# Patient Record
Sex: MALE | Race: WHITE | Employment: FULL TIME | URBAN - METROPOLITAN AREA
[De-identification: names, ages, dates, MRNs, and addresses within clinical notes are randomized per-mention and may not be internally consistent; named-entity substitution may affect disease eponyms.]

---

## 2019-03-29 ENCOUNTER — HOSPITAL ENCOUNTER (EMERGENCY)
Age: 44
Discharge: HOME | End: 2019-03-29
Payer: COMMERCIAL

## 2019-03-29 ENCOUNTER — APPOINTMENT (OUTPATIENT)
Dept: GENERAL RADIOLOGY | Age: 44
End: 2019-03-29
Payer: COMMERCIAL

## 2019-03-29 VITALS
RESPIRATION RATE: 17 BRPM | DIASTOLIC BLOOD PRESSURE: 74 MMHG | HEART RATE: 65 BPM | WEIGHT: 195 LBS | SYSTOLIC BLOOD PRESSURE: 137 MMHG | BODY MASS INDEX: 27.92 KG/M2 | TEMPERATURE: 97.6 F | HEIGHT: 70 IN | OXYGEN SATURATION: 97 %

## 2019-03-29 DIAGNOSIS — S39.012A STRAIN OF LUMBAR REGION, INITIAL ENCOUNTER: Primary | ICD-10-CM

## 2019-03-29 DIAGNOSIS — M54.31 SCIATICA OF RIGHT SIDE: ICD-10-CM

## 2019-03-29 PROCEDURE — 99283 EMERGENCY DEPT VISIT LOW MDM: CPT

## 2019-03-29 PROCEDURE — 6370000000 HC RX 637 (ALT 250 FOR IP): Performed by: NURSE PRACTITIONER

## 2019-03-29 PROCEDURE — 72100 X-RAY EXAM L-S SPINE 2/3 VWS: CPT

## 2019-03-29 RX ORDER — ASPIRIN 81 MG/1
81 TABLET, CHEWABLE ORAL DAILY
COMMUNITY

## 2019-03-29 RX ORDER — METHOCARBAMOL 750 MG/1
750 TABLET, FILM COATED ORAL ONCE
Status: COMPLETED | OUTPATIENT
Start: 2019-03-29 | End: 2019-03-29

## 2019-03-29 RX ORDER — NAPROXEN 250 MG/1
500 TABLET ORAL ONCE
Status: COMPLETED | OUTPATIENT
Start: 2019-03-29 | End: 2019-03-29

## 2019-03-29 RX ORDER — NAPROXEN 500 MG/1
500 TABLET ORAL 2 TIMES DAILY
Qty: 30 TABLET | Refills: 0 | Status: SHIPPED | OUTPATIENT
Start: 2019-03-29

## 2019-03-29 RX ORDER — METHOCARBAMOL 500 MG/1
500 TABLET, FILM COATED ORAL 3 TIMES DAILY
Qty: 20 TABLET | Refills: 0 | Status: SHIPPED | OUTPATIENT
Start: 2019-03-29

## 2019-03-29 RX ORDER — PREDNISONE 20 MG/1
60 TABLET ORAL ONCE
Status: COMPLETED | OUTPATIENT
Start: 2019-03-29 | End: 2019-03-29

## 2019-03-29 RX ORDER — PREDNISONE 10 MG/1
60 TABLET ORAL DAILY
Qty: 24 TABLET | Refills: 0 | Status: SHIPPED | OUTPATIENT
Start: 2019-03-29 | End: 2019-04-02

## 2019-03-29 RX ADMIN — PREDNISONE 60 MG: 20 TABLET ORAL at 17:38

## 2019-03-29 RX ADMIN — NAPROXEN 500 MG: 250 TABLET ORAL at 17:38

## 2019-03-29 RX ADMIN — METHOCARBAMOL TABLETS 750 MG: 750 TABLET, COATED ORAL at 17:38

## 2019-03-29 ASSESSMENT — PAIN DESCRIPTION - LOCATION
LOCATION: BACK
LOCATION: BACK

## 2019-03-29 ASSESSMENT — PAIN SCALES - GENERAL
PAINLEVEL_OUTOF10: 8
PAINLEVEL_OUTOF10: 8

## 2019-03-29 ASSESSMENT — PAIN DESCRIPTION - DESCRIPTORS: DESCRIPTORS: TIGHTNESS

## 2019-03-31 NOTE — ED PROVIDER NOTES
file     Gets together: Not on file     Attends Bahai service: Not on file     Active member of club or organization: Not on file     Attends meetings of clubs or organizations: Not on file     Relationship status: Not on file    Intimate partner violence:     Fear of current or ex partner: Not on file     Emotionally abused: Not on file     Physically abused: Not on file     Forced sexual activity: Not on file   Other Topics Concern    Not on file   Social History Narrative    Not on file     No current facility-administered medications for this encounter. Current Outpatient Medications   Medication Sig Dispense Refill    aspirin 81 MG chewable tablet Take 81 mg by mouth daily      naproxen (NAPROSYN) 500 MG tablet Take 1 tablet by mouth 2 times daily 30 tablet 0    methocarbamol (ROBAXIN) 500 MG tablet Take 1 tablet by mouth 3 times daily 20 tablet 0    predniSONE (DELTASONE) 10 MG tablet Take 6 tablets by mouth daily for 4 doses 24 tablet 0     No Known Allergies    REVIEW OF SYSTEMS  6 systems reviewed, pertinent positives per HPI otherwise noted to be negative    PHYSICAL EXAM  /74   Pulse 65   Temp 97.6 °F (36.4 °C) (Oral)   Resp 17   Ht 5' 10\" (1.778 m)   Wt 195 lb (88.5 kg)   SpO2 97%   BMI 27.98 kg/m²   GENERAL APPEARANCE: Awake and alert. Well-developed. Well-nourished. Non-toxic. Cooperative. No acute distress. HEAD: Normocephalic. Atraumatic. EYES: EOM's grossly intact. Bilateral conjunctiva are clear and without exudate. Conjunctiva non-injected. ENT: Mucous membranes are moist.   NECK: Supple. Normal ROM. CHEST: Equal symmetric chest rise. LUNGS: Breathing is unlabored. Speaking comfortably in full sentences. Bilateral lung sounds are clear to auscultation. Abdomen: Non distended, non tender and soft. Bowel sounds are audible and active in all four quadrants. EXTREMITIES: MAEE. No acute deformities.    No midline cervical, thoracic or lumbar spinal tenderness with palpation. The patient does have right lumbar paraspinal tenderness as well as right gluteal tenderness that is reproducible with palpation. Positive right straight leg raise. Patellar DTRs intact and 2+ bilaterally. Posterior tibial pulses are palpable and 2+ with capillary refill brisk and less than 3 seconds. SKIN: Warm and dry. Pink. No acute rashes. No erythema or warmth. NEUROLOGICAL: Alert and oriented x3. Strength is 5/5 in all extremities and sensation is intact. Ambulatory with steady gait. RADIOLOGY  Xr Lumbar Spine (2-3 Views)    Result Date: 3/29/2019  EXAMINATION: 3 XRAY VIEWS OF THE LUMBAR SPINE 3/29/2019 5:21 pm COMPARISON: None. HISTORY: ORDERING SYSTEM PROVIDED HISTORY: low back pain TECHNOLOGIST PROVIDED HISTORY: Reason for exam:->low back pain Ordering Physician Provided Reason for Exam: back pain radiating down right hip Acuity: Acute Type of Exam: Initial FINDINGS: The lumbar vertebral bodies are normal in height and alignment. No significant disc space narrowing or facet hypertrophy appreciated. No lumbar vertebral compression or subluxation. ED COURSE  I have evaluated this patient. Dr. Aniyah Jara was available for consultation. Emergency department course notable for lumbar strain with right sided sciatica. Afebrile. Vital stable. Circulation and sensation intact in the extremities. Negative for saddle anesthesia or loss of bowel or bladder control. X-ray of the lumbar spine unremarkable. Patient received  Naprosyn and Robaxin as well as steroids for pain, with good relief. Educated on back stretching exercises as well as warm/ cool compresses. A discussion was had with the patient regarding imaging results as well as plan of care. Patient instructed to follow up with PCP in 2-3 days for further evaluation/treatment. Patient will be discharged home with a prescription for the following medications below.  Patient verbalizes understanding, all questions were answered and he is agreeable with the plan of care. Patient will return to ED for new/worsening symptoms. Patient was given scripts for the following medications. I counseled patient how to take these medications. Discharge Medication List as of 3/29/2019  5:49 PM      START taking these medications    Details   naproxen (NAPROSYN) 500 MG tablet Take 1 tablet by mouth 2 times daily, Disp-30 tablet, R-0Print      methocarbamol (ROBAXIN) 500 MG tablet Take 1 tablet by mouth 3 times daily, Disp-20 tablet, R-0Print      predniSONE (DELTASONE) 10 MG tablet Take 6 tablets by mouth daily for 4 doses, Disp-24 tablet, R-0Print           MDM  No results found for this visit on 03/29/19. I estimate there is LOW risk for ABDOMINAL AORTIC ANEURYSM, CAUDA EQUINA SYNDROME, EPIDURAL MASS LESION, SPINAL STENOSIS, OR HERNIATED DISK CAUSING SEVERE STENOSIS, thus I consider the discharge disposition reasonable. Joy Phillips and I have discussed the diagnosis and risks, and we agree with discharging home to follow-up with their primary doctor. We also discussed returning to the Emergency Department immediately if new or worsening symptoms occur. We have discussed the symptoms which are most concerning (e.g., saddle anesthesia, urinary or bowel incontinence or retention, changing or worsening pain) that necessitate immediate return. Final Impression    1. Strain of lumbar region, initial encounter    2. Sciatica of right side        Blood pressure 137/74, pulse 65, temperature 97.6 °F (36.4 °C), temperature source Oral, resp. rate 17, height 5' 10\" (1.778 m), weight 195 lb (88.5 kg), SpO2 97 %. DISPOSITION  Patient was discharged to home in good condition. DISCLAIMER:  Please note this report has been produced using speech recognition Dragon software and may contain errors related to that system including errors in grammar, punctuation, and spelling, as well as words and phrases that may be inappropriate.  If there are any questions or concerns please feel free to contact the dictating provider for clarification.                                                         ISHA Mckeon - CNP  03/31/19 6837

## 2023-02-07 ENCOUNTER — APPOINTMENT (OUTPATIENT)
Dept: GENERAL RADIOLOGY | Age: 48
DRG: 166 | End: 2023-02-07
Payer: MEDICAID

## 2023-02-07 ENCOUNTER — HOSPITAL ENCOUNTER (INPATIENT)
Age: 48
LOS: 11 days | Discharge: HOME | DRG: 166 | End: 2023-02-19
Attending: EMERGENCY MEDICINE | Admitting: INTERNAL MEDICINE
Payer: MEDICAID

## 2023-02-07 DIAGNOSIS — J81.0 ACUTE PULMONARY EDEMA (HCC): Primary | ICD-10-CM

## 2023-02-07 DIAGNOSIS — J90 PLEURAL EFFUSION: ICD-10-CM

## 2023-02-07 DIAGNOSIS — J18.9 PNEUMONIA DUE TO INFECTIOUS ORGANISM, UNSPECIFIED LATERALITY, UNSPECIFIED PART OF LUNG: ICD-10-CM

## 2023-02-07 DIAGNOSIS — R09.02 HYPOXIA: ICD-10-CM

## 2023-02-07 PROCEDURE — 99285 EMERGENCY DEPT VISIT HI MDM: CPT

## 2023-02-07 PROCEDURE — 93005 ELECTROCARDIOGRAM TRACING: CPT | Performed by: EMERGENCY MEDICINE

## 2023-02-07 PROCEDURE — 71045 X-RAY EXAM CHEST 1 VIEW: CPT

## 2023-02-07 RX ORDER — IPRATROPIUM BROMIDE AND ALBUTEROL SULFATE 2.5; .5 MG/3ML; MG/3ML
1 SOLUTION RESPIRATORY (INHALATION) ONCE
Status: COMPLETED | OUTPATIENT
Start: 2023-02-07 | End: 2023-02-08

## 2023-02-07 RX ORDER — METHADONE HYDROCHLORIDE 10 MG/1
45 TABLET ORAL DAILY
COMMUNITY

## 2023-02-07 ASSESSMENT — ENCOUNTER SYMPTOMS
GASTROINTESTINAL NEGATIVE: 1
EYES NEGATIVE: 1
COUGH: 1
SHORTNESS OF BREATH: 1

## 2023-02-07 ASSESSMENT — PAIN - FUNCTIONAL ASSESSMENT: PAIN_FUNCTIONAL_ASSESSMENT: NONE - DENIES PAIN

## 2023-02-08 ENCOUNTER — APPOINTMENT (OUTPATIENT)
Dept: GENERAL RADIOLOGY | Age: 48
DRG: 166 | End: 2023-02-08
Payer: MEDICAID

## 2023-02-08 ENCOUNTER — APPOINTMENT (OUTPATIENT)
Dept: CT IMAGING | Age: 48
DRG: 166 | End: 2023-02-08
Payer: MEDICAID

## 2023-02-08 PROBLEM — D64.9 NORMOCYTIC ANEMIA: Status: ACTIVE | Noted: 2023-02-08

## 2023-02-08 PROBLEM — Z86.711 HX PULMONARY EMBOLISM: Status: ACTIVE | Noted: 2023-02-08

## 2023-02-08 PROBLEM — J90 PLEURAL EFFUSION: Status: ACTIVE | Noted: 2023-02-08

## 2023-02-08 PROBLEM — I10 HTN (HYPERTENSION): Status: ACTIVE | Noted: 2023-02-08

## 2023-02-08 PROBLEM — Z79.01 CHRONIC ANTICOAGULATION: Status: ACTIVE | Noted: 2023-02-08

## 2023-02-08 PROBLEM — F11.20 OPIOID DEPENDENCE ON AGONIST THERAPY (HCC): Status: ACTIVE | Noted: 2023-02-08

## 2023-02-08 PROBLEM — J94.2 HEMOTHORAX ON RIGHT: Status: ACTIVE | Noted: 2023-02-08

## 2023-02-08 PROBLEM — I50.9 ACUTE HEART FAILURE, UNSPECIFIED HEART FAILURE TYPE (HCC): Status: ACTIVE | Noted: 2023-02-08

## 2023-02-08 PROBLEM — J81.0 ACUTE PULMONARY EDEMA (HCC): Status: ACTIVE | Noted: 2023-02-08

## 2023-02-08 LAB
ALBUMIN SERPL-MCNC: 2.9 G/DL (ref 3.4–5)
ANION GAP SERPL CALCULATED.3IONS-SCNC: 11 MMOL/L (ref 3–16)
ANION GAP SERPL CALCULATED.3IONS-SCNC: 12 MMOL/L (ref 3–16)
APPEARANCE FLUID: NORMAL
BASE EXCESS VENOUS: 8.4 MMOL/L (ref -2–3)
BASOPHILS ABSOLUTE: 0 K/UL (ref 0–0.2)
BASOPHILS RELATIVE PERCENT: 0.2 %
BUN BLDV-MCNC: 11 MG/DL (ref 7–20)
BUN BLDV-MCNC: 11 MG/DL (ref 7–20)
CALCIUM SERPL-MCNC: 8.5 MG/DL (ref 8.3–10.6)
CALCIUM SERPL-MCNC: 8.6 MG/DL (ref 8.3–10.6)
CARBOXYHEMOGLOBIN: 1.4 % (ref 0–1.5)
CELL COUNT FLUID TYPE: NORMAL
CHLORIDE BLD-SCNC: 90 MMOL/L (ref 99–110)
CHLORIDE BLD-SCNC: 91 MMOL/L (ref 99–110)
CLOT EVALUATION: NORMAL
CO2: 31 MMOL/L (ref 21–32)
CO2: 32 MMOL/L (ref 21–32)
COLOR FLUID: NORMAL
CREAT SERPL-MCNC: 0.9 MG/DL (ref 0.9–1.3)
CREAT SERPL-MCNC: 1 MG/DL (ref 0.9–1.3)
EKG ATRIAL RATE: 103 BPM
EKG DIAGNOSIS: NORMAL
EKG P AXIS: 51 DEGREES
EKG P-R INTERVAL: 258 MS
EKG Q-T INTERVAL: 342 MS
EKG QRS DURATION: 88 MS
EKG QTC CALCULATION (BAZETT): 448 MS
EKG R AXIS: -30 DEGREES
EKG T AXIS: 151 DEGREES
EKG VENTRICULAR RATE: 103 BPM
EOSINOPHILS ABSOLUTE: 0 K/UL (ref 0–0.6)
EOSINOPHILS RELATIVE PERCENT: 0.4 %
FOLATE: <2 NG/ML (ref 4.78–24.2)
GFR SERPL CREATININE-BSD FRML MDRD: >60 ML/MIN/{1.73_M2}
GFR SERPL CREATININE-BSD FRML MDRD: >60 ML/MIN/{1.73_M2}
GLUCOSE BLD-MCNC: 100 MG/DL (ref 70–99)
GLUCOSE BLD-MCNC: 119 MG/DL (ref 70–99)
HCO3 VENOUS: 34.6 MMOL/L (ref 24–28)
HCT VFR BLD CALC: 33.2 % (ref 40.5–52.5)
HEMOGLOBIN, VEN, REDUCED: 43.7 %
HEMOGLOBIN: 10.9 G/DL (ref 13.5–17.5)
INFLUENZA A: NOT DETECTED
INFLUENZA B: NOT DETECTED
INR BLD: 2.57 (ref 0.87–1.14)
IRON SATURATION: 48 % (ref 20–50)
IRON: 113 UG/DL (ref 59–158)
LACTATE DEHYDROGENASE: 777 U/L (ref 100–190)
LACTIC ACID: 1.7 MMOL/L (ref 0.4–2)
LV EF: 58 %
LVEF MODALITY: NORMAL
LYMPHOCYTES ABSOLUTE: 0.6 K/UL (ref 1–5.1)
LYMPHOCYTES RELATIVE PERCENT: 7.4 %
LYMPHOCYTES, BODY FLUID: 34 %
MAGNESIUM: 2.1 MG/DL (ref 1.8–2.4)
MCH RBC QN AUTO: 32.6 PG (ref 26–34)
MCHC RBC AUTO-ENTMCNC: 32.9 G/DL (ref 31–36)
MCV RBC AUTO: 99.2 FL (ref 80–100)
METHEMOGLOBIN VENOUS: <0 % (ref 0–1.5)
MONOCYTE, FLUID: 2 %
MONOCYTES ABSOLUTE: 0.3 K/UL (ref 0–1.3)
MONOCYTES RELATIVE PERCENT: 3.1 %
NEUTROPHIL, FLUID: 64 %
NEUTROPHILS ABSOLUTE: 7.5 K/UL (ref 1.7–7.7)
NEUTROPHILS RELATIVE PERCENT: 88.9 %
NUCLEATED CELLS FLUID: 629 /CUMM
O2 SAT, VEN: 56 %
PCO2, VEN: 48.8 MMHG (ref 41–51)
PDW BLD-RTO: 18.7 % (ref 12.4–15.4)
PH VENOUS: 7.46 (ref 7.35–7.45)
PLATELET # BLD: 196 K/UL (ref 135–450)
PMV BLD AUTO: 10.8 FL (ref 5–10.5)
PO2, VEN: 33.2 MMHG (ref 25–40)
POTASSIUM SERPL-SCNC: 3.3 MMOL/L (ref 3.5–5.1)
POTASSIUM SERPL-SCNC: 3.6 MMOL/L (ref 3.5–5.1)
PRO-BNP: 4462 PG/ML (ref 0–124)
PROCALCITONIN: 0.19 NG/ML (ref 0–0.15)
PROTHROMBIN TIME: 27.7 SEC (ref 11.7–14.5)
RBC # BLD: 3.34 M/UL (ref 4.2–5.9)
RBC FLUID: NORMAL /CUMM
SARS-COV-2 RNA, RT PCR: NOT DETECTED
SODIUM BLD-SCNC: 133 MMOL/L (ref 136–145)
SODIUM BLD-SCNC: 134 MMOL/L (ref 136–145)
TCO2 CALC VENOUS: 36 MMOL/L
TOTAL IRON BINDING CAPACITY: 234 UG/DL (ref 260–445)
TOTAL PROTEIN: 6.8 G/DL (ref 6.4–8.2)
TROPONIN: <0.01 NG/ML
TROPONIN: <0.01 NG/ML
TSH SERPL DL<=0.05 MIU/L-ACNC: 1.86 UIU/ML (ref 0.27–4.2)
VITAMIN B-12: 622 PG/ML (ref 211–911)
WBC # BLD: 8.4 K/UL (ref 4–11)

## 2023-02-08 PROCEDURE — 82803 BLOOD GASES ANY COMBINATION: CPT

## 2023-02-08 PROCEDURE — 84155 ASSAY OF PROTEIN SERUM: CPT

## 2023-02-08 PROCEDURE — 2700000000 HC OXYGEN THERAPY PER DAY

## 2023-02-08 PROCEDURE — 93306 TTE W/DOPPLER COMPLETE: CPT

## 2023-02-08 PROCEDURE — 94761 N-INVAS EAR/PLS OXIMETRY MLT: CPT

## 2023-02-08 PROCEDURE — 6360000004 HC RX CONTRAST MEDICATION: Performed by: EMERGENCY MEDICINE

## 2023-02-08 PROCEDURE — 83735 ASSAY OF MAGNESIUM: CPT

## 2023-02-08 PROCEDURE — 83550 IRON BINDING TEST: CPT

## 2023-02-08 PROCEDURE — 71260 CT THORAX DX C+: CPT | Performed by: EMERGENCY MEDICINE

## 2023-02-08 PROCEDURE — 94640 AIRWAY INHALATION TREATMENT: CPT

## 2023-02-08 PROCEDURE — 82746 ASSAY OF FOLIC ACID SERUM: CPT

## 2023-02-08 PROCEDURE — 85610 PROTHROMBIN TIME: CPT

## 2023-02-08 PROCEDURE — 2580000003 HC RX 258: Performed by: INTERNAL MEDICINE

## 2023-02-08 PROCEDURE — 85025 COMPLETE CBC W/AUTO DIFF WBC: CPT

## 2023-02-08 PROCEDURE — 6370000000 HC RX 637 (ALT 250 FOR IP): Performed by: STUDENT IN AN ORGANIZED HEALTH CARE EDUCATION/TRAINING PROGRAM

## 2023-02-08 PROCEDURE — 6370000000 HC RX 637 (ALT 250 FOR IP): Performed by: INTERNAL MEDICINE

## 2023-02-08 PROCEDURE — 84145 PROCALCITONIN (PCT): CPT

## 2023-02-08 PROCEDURE — 82042 OTHER SOURCE ALBUMIN QUAN EA: CPT

## 2023-02-08 PROCEDURE — 32551 INSERTION OF CHEST TUBE: CPT | Performed by: INTERNAL MEDICINE

## 2023-02-08 PROCEDURE — 82607 VITAMIN B-12: CPT

## 2023-02-08 PROCEDURE — 99223 1ST HOSP IP/OBS HIGH 75: CPT | Performed by: INTERNAL MEDICINE

## 2023-02-08 PROCEDURE — 83605 ASSAY OF LACTIC ACID: CPT

## 2023-02-08 PROCEDURE — 82040 ASSAY OF SERUM ALBUMIN: CPT

## 2023-02-08 PROCEDURE — 83615 LACTATE (LD) (LDH) ENZYME: CPT

## 2023-02-08 PROCEDURE — 84157 ASSAY OF PROTEIN OTHER: CPT

## 2023-02-08 PROCEDURE — 80307 DRUG TEST PRSMV CHEM ANLYZR: CPT

## 2023-02-08 PROCEDURE — 80048 BASIC METABOLIC PNL TOTAL CA: CPT

## 2023-02-08 PROCEDURE — 83540 ASSAY OF IRON: CPT

## 2023-02-08 PROCEDURE — 87636 SARSCOV2 & INF A&B AMP PRB: CPT

## 2023-02-08 PROCEDURE — 82945 GLUCOSE OTHER FLUID: CPT

## 2023-02-08 PROCEDURE — 84484 ASSAY OF TROPONIN QUANT: CPT

## 2023-02-08 PROCEDURE — 6360000002 HC RX W HCPCS: Performed by: EMERGENCY MEDICINE

## 2023-02-08 PROCEDURE — 6370000000 HC RX 637 (ALT 250 FOR IP): Performed by: EMERGENCY MEDICINE

## 2023-02-08 PROCEDURE — 83880 ASSAY OF NATRIURETIC PEPTIDE: CPT

## 2023-02-08 PROCEDURE — 89050 BODY FLUID CELL COUNT: CPT

## 2023-02-08 PROCEDURE — 0W9930Z DRAINAGE OF RIGHT PLEURAL CAVITY WITH DRAINAGE DEVICE, PERCUTANEOUS APPROACH: ICD-10-PCS | Performed by: INTERNAL MEDICINE

## 2023-02-08 PROCEDURE — 6360000002 HC RX W HCPCS: Performed by: INTERNAL MEDICINE

## 2023-02-08 PROCEDURE — 36415 COLL VENOUS BLD VENIPUNCTURE: CPT

## 2023-02-08 PROCEDURE — 84443 ASSAY THYROID STIM HORMONE: CPT

## 2023-02-08 PROCEDURE — 2060000000 HC ICU INTERMEDIATE R&B

## 2023-02-08 PROCEDURE — 71045 X-RAY EXAM CHEST 1 VIEW: CPT

## 2023-02-08 RX ORDER — SODIUM CHLORIDE 0.9 % (FLUSH) 0.9 %
5-40 SYRINGE (ML) INJECTION EVERY 12 HOURS SCHEDULED
Status: DISCONTINUED | OUTPATIENT
Start: 2023-02-08 | End: 2023-02-19 | Stop reason: HOSPADM

## 2023-02-08 RX ORDER — FUROSEMIDE 10 MG/ML
40 INJECTION INTRAMUSCULAR; INTRAVENOUS 2 TIMES DAILY
Status: DISCONTINUED | OUTPATIENT
Start: 2023-02-08 | End: 2023-02-09

## 2023-02-08 RX ORDER — FOLIC ACID 1 MG/1
1 TABLET ORAL DAILY
Status: DISCONTINUED | OUTPATIENT
Start: 2023-02-08 | End: 2023-02-19 | Stop reason: HOSPADM

## 2023-02-08 RX ORDER — METHOCARBAMOL 500 MG/1
500 TABLET, FILM COATED ORAL 3 TIMES DAILY
Status: DISCONTINUED | OUTPATIENT
Start: 2023-02-08 | End: 2023-02-08

## 2023-02-08 RX ORDER — MAGNESIUM HYDROXIDE/ALUMINUM HYDROXICE/SIMETHICONE 120; 1200; 1200 MG/30ML; MG/30ML; MG/30ML
30 SUSPENSION ORAL EVERY 6 HOURS PRN
Status: DISCONTINUED | OUTPATIENT
Start: 2023-02-08 | End: 2023-02-19 | Stop reason: HOSPADM

## 2023-02-08 RX ORDER — METHADONE HYDROCHLORIDE 10 MG/ML
45 CONCENTRATE ORAL DAILY
Status: DISCONTINUED | OUTPATIENT
Start: 2023-02-08 | End: 2023-02-19 | Stop reason: HOSPADM

## 2023-02-08 RX ORDER — ONDANSETRON 2 MG/ML
4 INJECTION INTRAMUSCULAR; INTRAVENOUS EVERY 6 HOURS PRN
Status: DISCONTINUED | OUTPATIENT
Start: 2023-02-08 | End: 2023-02-10

## 2023-02-08 RX ORDER — ASPIRIN 81 MG/1
81 TABLET, CHEWABLE ORAL DAILY
Status: DISCONTINUED | OUTPATIENT
Start: 2023-02-08 | End: 2023-02-19 | Stop reason: HOSPADM

## 2023-02-08 RX ORDER — POTASSIUM CHLORIDE 7.45 MG/ML
10 INJECTION INTRAVENOUS PRN
Status: DISCONTINUED | OUTPATIENT
Start: 2023-02-08 | End: 2023-02-19 | Stop reason: HOSPADM

## 2023-02-08 RX ORDER — ENOXAPARIN SODIUM 100 MG/ML
40 INJECTION SUBCUTANEOUS DAILY
Status: DISCONTINUED | OUTPATIENT
Start: 2023-02-08 | End: 2023-02-08

## 2023-02-08 RX ORDER — VALSARTAN 80 MG/1
40 TABLET ORAL EVERY 12 HOURS SCHEDULED
Status: DISCONTINUED | OUTPATIENT
Start: 2023-02-08 | End: 2023-02-17

## 2023-02-08 RX ORDER — SODIUM CHLORIDE 9 MG/ML
INJECTION, SOLUTION INTRAVENOUS PRN
Status: DISCONTINUED | OUTPATIENT
Start: 2023-02-08 | End: 2023-02-19 | Stop reason: HOSPADM

## 2023-02-08 RX ORDER — ACETAMINOPHEN 325 MG/1
650 TABLET ORAL EVERY 6 HOURS PRN
Status: DISCONTINUED | OUTPATIENT
Start: 2023-02-08 | End: 2023-02-19 | Stop reason: HOSPADM

## 2023-02-08 RX ORDER — FUROSEMIDE 10 MG/ML
40 INJECTION INTRAMUSCULAR; INTRAVENOUS ONCE
Status: COMPLETED | OUTPATIENT
Start: 2023-02-08 | End: 2023-02-08

## 2023-02-08 RX ORDER — ACETAMINOPHEN 650 MG/1
650 SUPPOSITORY RECTAL EVERY 6 HOURS PRN
Status: DISCONTINUED | OUTPATIENT
Start: 2023-02-08 | End: 2023-02-19 | Stop reason: HOSPADM

## 2023-02-08 RX ORDER — POTASSIUM CHLORIDE 20 MEQ/1
40 TABLET, EXTENDED RELEASE ORAL PRN
Status: DISCONTINUED | OUTPATIENT
Start: 2023-02-08 | End: 2023-02-19 | Stop reason: HOSPADM

## 2023-02-08 RX ORDER — POTASSIUM CHLORIDE 20 MEQ/1
20 TABLET, EXTENDED RELEASE ORAL 2 TIMES DAILY WITH MEALS
Status: DISCONTINUED | OUTPATIENT
Start: 2023-02-08 | End: 2023-02-13

## 2023-02-08 RX ORDER — POLYETHYLENE GLYCOL 3350 17 G/17G
17 POWDER, FOR SOLUTION ORAL DAILY PRN
Status: DISCONTINUED | OUTPATIENT
Start: 2023-02-08 | End: 2023-02-19 | Stop reason: HOSPADM

## 2023-02-08 RX ORDER — CARVEDILOL 3.12 MG/1
3.12 TABLET ORAL 2 TIMES DAILY WITH MEALS
Status: DISCONTINUED | OUTPATIENT
Start: 2023-02-08 | End: 2023-02-17

## 2023-02-08 RX ORDER — SODIUM CHLORIDE 0.9 % (FLUSH) 0.9 %
5-40 SYRINGE (ML) INJECTION PRN
Status: DISCONTINUED | OUTPATIENT
Start: 2023-02-08 | End: 2023-02-19 | Stop reason: HOSPADM

## 2023-02-08 RX ORDER — ONDANSETRON 4 MG/1
4 TABLET, ORALLY DISINTEGRATING ORAL EVERY 8 HOURS PRN
Status: DISCONTINUED | OUTPATIENT
Start: 2023-02-08 | End: 2023-02-10

## 2023-02-08 RX ORDER — MAGNESIUM SULFATE IN WATER 40 MG/ML
2000 INJECTION, SOLUTION INTRAVENOUS PRN
Status: DISCONTINUED | OUTPATIENT
Start: 2023-02-08 | End: 2023-02-19 | Stop reason: HOSPADM

## 2023-02-08 RX ADMIN — VALSARTAN 40 MG: 80 TABLET, FILM COATED ORAL at 09:42

## 2023-02-08 RX ADMIN — CARVEDILOL 3.12 MG: 3.12 TABLET, FILM COATED ORAL at 16:09

## 2023-02-08 RX ADMIN — IPRATROPIUM BROMIDE AND ALBUTEROL SULFATE 1 AMPULE: 2.5; .5 SOLUTION RESPIRATORY (INHALATION) at 00:13

## 2023-02-08 RX ADMIN — SODIUM CHLORIDE, PRESERVATIVE FREE 10 ML: 5 INJECTION INTRAVENOUS at 21:59

## 2023-02-08 RX ADMIN — FOLIC ACID 1 MG: 1 TABLET ORAL at 14:56

## 2023-02-08 RX ADMIN — CARVEDILOL 3.12 MG: 3.12 TABLET, FILM COATED ORAL at 09:43

## 2023-02-08 RX ADMIN — ASPIRIN 81 MG 81 MG: 81 TABLET ORAL at 09:45

## 2023-02-08 RX ADMIN — APIXABAN 5 MG: 5 TABLET, FILM COATED ORAL at 09:42

## 2023-02-08 RX ADMIN — FUROSEMIDE 40 MG: 10 INJECTION, SOLUTION INTRAMUSCULAR; INTRAVENOUS at 18:17

## 2023-02-08 RX ADMIN — POTASSIUM CHLORIDE 20 MEQ: 20 TABLET, EXTENDED RELEASE ORAL at 16:09

## 2023-02-08 RX ADMIN — FUROSEMIDE 40 MG: 10 INJECTION, SOLUTION INTRAMUSCULAR; INTRAVENOUS at 09:43

## 2023-02-08 RX ADMIN — Medication 45 MG: at 10:08

## 2023-02-08 RX ADMIN — FUROSEMIDE 40 MG: 10 INJECTION, SOLUTION INTRAMUSCULAR; INTRAVENOUS at 03:27

## 2023-02-08 RX ADMIN — POTASSIUM CHLORIDE 20 MEQ: 20 TABLET, EXTENDED RELEASE ORAL at 09:42

## 2023-02-08 RX ADMIN — VALSARTAN 40 MG: 80 TABLET, FILM COATED ORAL at 22:01

## 2023-02-08 RX ADMIN — IOPAMIDOL 80 ML: 755 INJECTION, SOLUTION INTRAVENOUS at 01:15

## 2023-02-08 RX ADMIN — SODIUM CHLORIDE, PRESERVATIVE FREE 10 ML: 5 INJECTION INTRAVENOUS at 09:43

## 2023-02-08 ASSESSMENT — ENCOUNTER SYMPTOMS
SHORTNESS OF BREATH: 1
COUGH: 1
GASTROINTESTINAL NEGATIVE: 1

## 2023-02-08 NOTE — CONSULTS
Nutrition Note    Consult for Heart Healthy diet educaiton. RD spoke w/ pt this AM & provided pt w/ heart healthy diet handouts for pt to take home. Pt did not ask any questions. Pt was trying to sleep during diet education, not very receptive during RD encounter. Left handouts in room & encouraged pt to call if any questions arise during LOS.  lbs w/ no recent wt loss reported. No PI's noted. Pt is on a regular, MAKENZIE diet. Rec'd continuing POC. No addt nutrition concerns at this time, no indication for ONS. RD following. Instructed the Patient on:   [x] Low Sodium foods  [x] Sodium free  [x] Fluids   [] Other     Educational Materials Provided:   [x] [facility] ([facility]) Heart Failure Education folder- Nutrition Therapy   [x] Nutrition Care Manual Heart Failure Nutrition Therapy   [] Other        The patient will still be monitored per nutrition standards of care. Consult dietitian if nutrition interventions essential to patient care is needed.      Sarah Beaver, [address] [facility]: [phone]  Office:  [phone]

## 2023-02-08 NOTE — PROCEDURES
Procedure planned:  Chest tube placement right side with imaging    Time out: per Dr. Ember Crowley    Procedure:  Patient positioned laying on his left side. Ultrasound was used to isolate best area for procedure. Patient prepped and draped in sterile fashion. Anesthetized locally with lidocaine. Access to pleural space with lidocaine needle and pleura anesthetized as well. Finder needle was placed in pleural space and a wire was advanced through the needle. A small fazal was made in the skin and the tract was dilated over the wire. The 15 F Chest tube was placed over the wire using the Seldinger technique, sutured into place and the dressed and attached to a chest tube atrium. I did collect fluid for study. Patient tolerated the procedure well post procedure xray is pending. EBL:  minimal, although fluid was dark and bloody. Pleural fluid studies were sent.     Temi Chang MD

## 2023-02-08 NOTE — CONSULTS
Pulmonology Consult Note  PGY-3    PCP: None None    Code:Full Code  Admit Date: 2/7/2023  Diet: ADULT DIET; Regular; No Added Salt (3-4 gm)      History of present illness:      CC: SOB    Patient is a 56 y.o. male with a PMHx of PE on Eliquis, IVDU, hep C, COPD, hypertension presented to the ED with chief complaint of shortness of breath. Patient mentions that his shortness of breath has been going on for the past 6 or 7 days. His SOB is associated with productive cough with white sputum production. SOB is worse with exertion. Also, reports some nausea, but no vomiting. He denies any fever, chills, any sick contact. Patient denies any LE edema, weakness, chest pain, constipation, diarrhea. Patient has history of PE and was supposed to take Eliquis for 6 months. However, he did not take Eliquis regularly. Once he started having shortness of breath, he is started to take Eliquis daily or twice daily based on his symptoms of shortness of breath. His last dose of Eliquis was last night. In the ED, temperature 97.7 °F, respiratory rate 24, heart rate 88, blood pressure 130/87, SPO2 94% on 15 L of oxygen. Labs were significant for sodium 134, potassium 3.3, Pro-Devyn 0.19, proBNP 4462. WBC was within normal limit. Chest x-ray was done and showed Extensive bilateral airspace disease, which may represent pneumonia or pulmonary edema, Moderate to large right and questionable small left pleural effusions. CT chest was done and showed Linear, eccentric, and left lobe filling defects in the right lower lobe segmental and subsegmental pulmonary arteries are suggestive of chronic pulmonary emboli. Extensive groundglass opacification of both lungs is suspicious for atypical pneumonia although a component of asymmetric pulmonary edema could have a similar appearance. Large complex loculated right pleural fluid collection. Pulmonology was consulted for large complex loculated right pleural fluid collection.     ROS: Review of Systems -   All other systems reviewed and are negative. Past Medical / Surgical History:    Past Medical History:   Diagnosis Date    htn     IVDU (intravenous drug user)     PE (pulmonary thromboembolism) ([facility] [address]      No past surgical history on file. Medications Prior to Admission:    No current facility-administered medications on file prior to encounter. Current Outpatient Medications on File Prior to Encounter   Medication Sig Dispense Refill    apixaban (ELIQUIS) 5 MG TABS tablet Take 5 mg by mouth 2 times daily      methadone (DOLOPHINE) 10 MG tablet Take 45 mg by mouth daily. aspirin 81 MG chewable tablet Take 81 mg by mouth daily      naproxen (NAPROSYN) 500 MG tablet Take 1 tablet by mouth 2 times daily (Patient not taking: Reported on 2/8/2023) 30 tablet 0    methocarbamol (ROBAXIN) 500 MG tablet Take 1 tablet by mouth 3 times daily (Patient not taking: Reported on 2/8/2023) 20 tablet 0       Allergies:  Patient has no known allergies. Social History:   TOBACCO:   reports that he has been smoking. He has been smoking an average of .5 packs per day. He has never used smokeless tobacco.       ETOH:   reports that he does not currently use alcohol. Patient currently lives with his girl friend. Family History:   No family history on file. Vital/I&O/Physical examination:   VS:  /81   Pulse 89   Temp 98.1 °F (36.7 °C) (Oral)   Resp 26   Ht 5' 10\" (1.778 m)   Wt 222 lb 3.6 oz (100.8 kg)   SpO2 96%   BMI 31.89 kg/m²     I/O:    Intake/Output Summary (Last 24 hours) at 2/8/2023 1010  Last data filed at 2/8/2023 0747  Gross per 24 hour   Intake --   Output 320 ml   Net -320 ml       PE:  Physical Exam  Vitals reviewed. HENT:      Nose: Nose normal.      Mouth/Throat:      Mouth: Mucous membranes are moist.   Eyes:      Extraocular Movements: Extraocular movements intact.       Conjunctiva/sclera: Conjunctivae normal.      Pupils: Pupils are equal, round, and reactive to light. Cardiovascular:      Rate and Rhythm: Normal rate and regular rhythm. Pulses: Normal pulses. Heart sounds: Normal heart sounds. Pulmonary:      Comments: Decreased breath sounds on the right base of the lung, crackles on the left side. On 8 L of nasal cannula  Abdominal:      Palpations: Abdomen is soft. Musculoskeletal:         General: Normal range of motion. Cervical back: Normal range of motion and neck supple. Neurological:      General: No focal deficit present. Mental Status: He is alert and oriented to person, place, and time. Labs & Imaging:   LABS:  CBC:   Recent Labs     02/08/23 0024   WBC 8.4   HGB 10.9*   HCT 33.2*      MCV 99.2                            Renal:   Recent Labs     02/08/23 0024 02/08/23 0519   * 133*   K 3.3* 3.6   CL 91* 90*   CO2 32 31   BUN 11 11   CREATININE 0.9 1.0   GLUCOSE 119* 100*   ANIONGAP 11 12     Hepatic: No results for input(s): AST, ALT, ALB, BILITOT, ALKPHOS in the last 72 hours. Troponin:   Recent Labs     02/08/23 0024 02/08/23 0519   TROPONINI <0.01 <0.01     BNP: No results for input(s): BNP in the last 72 hours. Lipids: No results for input(s): CHOL, HDL in the last 72 hours. Invalid input(s): LDLCALCU, TRIGLYCERIDE  INR: No results for input(s): INR in the last 72 hours. Lactate: No results for input(s): LACTATE in the last 72 hours. ABGs:No results for input(s): PHART, BDW5YRE, PO2ART, SLV2MXB, BEART, THGBART, N5TSKAHB, ZJO0COL in the last 72 hours. UA:No results for input(s): NITRITE, COLORU, PHUR, LABCAST, WBCUA, RBCUA, MUCUS, TRICHOMONAS, YEAST, BACTERIA, CLARITYU, SPECGRAV, LEUKOCYTESUR, UROBILINOGEN, BILIRUBINUR, BLOODU, GLUCOSEU, AMORPHOUS in the last 72 hours.     Invalid input(s): Nory Levine     IMAGING:  CT CHEST PULMONARY EMBOLISM W CONTRAST   Final Result      Linear, eccentric, and left leg filling defects in the right lower lobe segmental and subsegmental pulmonary arteries are suggestive of chronic pulmonary emboli. No definite acute pulmonary emboli identified. Extensive groundglass opacification of both lungs is suspicious for atypical pneumonia although a component of asymmetric pulmonary edema could have a similar appearance. Large complex loculated right pleural fluid collection. Recommend diagnostic thoracentesis, as hemothorax or malignant effusion cannot be excluded. Trace left pleural effusion. INCIDENTAL FINDINGS: None. XR CHEST PORTABLE   Final Result      Extensive bilateral airspace disease, which may represent pneumonia or pulmonary edema. Cardiomegaly. Moderate to large right and questionable small left pleural effusions. Assessment & Plan:    Agustín Segura is a 56 y.o. male with PMHx significant for PE on Eliquis, IVDU, hep C, COPD, hypertension presented to the ED with chief complaint of shortness of breath. Pulmonology was consulted for large complex loculated right pleural fluid collection. Large complex loculated right pleural fluid:  -Patient presented with shortness of breath for the past 8 days.  -He has been having cough and whitish sputum production. Patient has history of PE.  -CT chest showed linear, eccentric, and left lobe filling defects in the right lower lobe segmental and subsegmental pulmonary arteries are suggestive of chronic pulmonary emboli. Extensive groundglass opacification of both lungs is suspicious for atypical pneumonia although a component of asymmetric pulmonary edema could have a similar appearance. Large complex loculated right pleural fluid collection. -PT 27.7 and INR 2.57 this morning  -We will place chest tube this afternoon. And will send fluid for analysis. Code Status: Full Code  ADULT DIET; Regular;  No Added Salt (3-4 gm)      This patient will be discussed with attending, Dr. Eusebia Gore MD.    Rylee Lou MD MD, PGY- 3  Contact via [employer]  2/8/2023,  10:10 AM     Patient seen, examined and discussed with the resident and I agree with the assessment and plan. Briefly, this is a 56 y.o. male with acute hypoxemic respiratory failure, diffuse left sided airspace disease and loculated pleural effusion on the right. Etiology of the left sided airspace disease is unclear and workup is pending. The unilateral nature is unusual.  The effusion is concerning for a hemothorax and patient reports symptoms that felt similar to his PE 11 months ago for about a week. Interestingly he had a bunch of Eliquis at home because he was noncompliant after his PE. Then when he thought it was back, he started taking it again based on how he felt. 1 tablet if he felt not too bad and 2 if he felt real bad. I don't know if he took more than that. His elevated INR suggest he was at least taking some and he thinks he took his last dose, last night. I examined him this morning with ultrasound and it's a highly complicated fluid collection with multiple septations. He needed a chest tube. I waited till the end of the day so the Eliquis would be more out of his system and performed it at bedside.         Patel Camacho MD

## 2023-02-08 NOTE — DISCHARGE INSTRUCTIONS
Extra Heart Failure sites:   https://[employer]media/ --- this is American Heart Association interactive Healthier Living with Heart Failure guidebook. Please copy and paste link into search bar. Use your mouse to scroll through the pages. Lots and lots of info / tips    [facility] [place] mariela --- free smart phone mariela available for [employer] and [employer]. Use your phone to track sodium / fluid intake, symptoms, weight, etc.    [employer] - website-- [employer] is a dialysis company. All dialysis patients follow a renal diet which IS low sodium!! This website offers free seasonal cookbooks. Each quarter, they will release 25-30 new recipes with a breakdown of calories, sodium, glucose, etc    www.eatingwell.com/recipes -- more free recipes  -Please start taking protonix 40mg Twice daily for at least 4 weeks  -Please continue taking your folate supplements  -Please follow up with Dr Anabelle Bullock, Pulmonologist (Lung Doctor) in 3 weeks  -Please take Prednisone 40mg daily until any adjustments made by your pulmonologist.  -Please be regular with your Eliquis everyday  -Please follow up with the Gastroenterologist, Dr Chiki Suazo (The Stomach doctor) to have your EGD scheduled. -In case you develop any chest pain, shortness of breath, dark or bright red colored stools or any concerning symptoms please call your PCP or call 911 or reach your nearest ED.

## 2023-02-08 NOTE — ED PROVIDER NOTES
[address] ENCOUNTER          ATTENDING PHYSICIAN NOTE       Date of evaluation: 2/7/2023    Chief Complaint     Shortness of Breath (Pt complaining of sob x6 days. Was brought in on a NRB and 94%. States he has not been taking his blood thinner as he should)      History of Present Illness     Xiomara Garza is a 56 y.o. male who presents to the emergency department with complaint of shortness of breath. Patient states has been having increasing shortness of breath for the last 6 to 7 days. He does note a cough is nonproductive of sputum. He denies any fevers or chills. He does note chest pain predominantly the right side of the chest is worse when he coughs. He denies any nausea, vomiting, or diarrhea. He does note swelling to the bilateral lower extremities which he states is baseline for him. Patient does have a history of COPD and states he tried using his inhalers without improvement of his symptoms. He also has a history of pulmonary embolism and states that he has been noncompliant with his medications because he \"gets confused as to when to take them. \"  On arrival, patient was noted to be hypoxic into the 70s on room air but did come up with a nonrebreather. ASSESSMENT / PLAN  (MEDICAL DECISION MAKING)     INITIAL VITALS: BP: 130/87, Temp: 97.7 °F (36.5 °C), Heart Rate: 88, Resp: 24, SpO2: 94 %      Xiomara Garza is a 56 y.o. male who presents for evaluation of shortness of breath. Patient states that symptoms been going on for approximately 6 days. Patient does have history of pulmonary embolism and has been noncompliant with his medications. On arrival, patient is hypoxic with oxygen saturations in the 70s on room air. He does respond to nonrebreather and eventually nasal cannula. Laboratory studies are significant for a normal white blood cell count. Renal panel is unremarkable. Flu and COVID swabs are negative. Troponin is negative.   NT proBNP is elevated at 4400. VBG shows no respiratory acidosis. Chest x-ray shows bilateral pleural effusions and bilateral airspace disease. CTPA shows evidence of chronic pulmonary embolism but no acute pulmonary embolism. He does have a large pleural effusion on the right and small effusion on the left with bilateral airspace disease, infection versus asymmetric edema. Patient's clinical scenario is more consistent with pulmonary edema as the cause of his symptoms. Patient was started on diuresis. Patient will be admitted to the hospitalist service for further management. Medical Decision Making  Problems Addressed:  Acute pulmonary edema ([address]): acute illness or injury  Hypoxia: acute illness or injury  Pleural effusion: acute illness or injury    Amount and/or Complexity of Data Reviewed  External Data Reviewed: labs, radiology and notes. Labs: ordered. Decision-making details documented in ED Course. Radiology: ordered. Decision-making details documented in ED Course. ECG/medicine tests: ordered and independent interpretation performed. Decision-making details documented in ED Course. Risk  Prescription drug management. Decision regarding hospitalization. Clinical Impression     1. Acute pulmonary edema (HCC)    2. Pleural effusion    3. Hypoxia        Disposition     PATIENT REFERRED TO:  No follow-up provider specified. DISCHARGE MEDICATIONS:  New Prescriptions    No medications on file       DISPOSITION Admitted 02/08/2023 03:10:03 AM        Diagnostic Results and Other Data       RADIOLOGY:  CT CHEST PULMONARY EMBOLISM W CONTRAST   Final Result      Linear, eccentric, and left leg filling defects in the right lower lobe segmental and subsegmental pulmonary arteries are suggestive of chronic pulmonary emboli. No definite acute pulmonary emboli identified.       Extensive groundglass opacification of both lungs is suspicious for atypical pneumonia although a component of asymmetric pulmonary edema could have a similar appearance. Large complex loculated right pleural fluid collection. Recommend diagnostic thoracentesis, as hemothorax or malignant effusion cannot be excluded. Trace left pleural effusion. INCIDENTAL FINDINGS: None. XR CHEST PORTABLE   Final Result      Extensive bilateral airspace disease, which may represent pneumonia or pulmonary edema. Cardiomegaly. Moderate to large right and questionable small left pleural effusions.           LABS:   Results for orders placed or performed during the hospital encounter of 02/07/23   COVID-19 & Influenza Combo    Specimen: Nasopharyngeal Swab   Result Value Ref Range    SARS-CoV-2 RNA, RT PCR NOT DETECTED NOT DETECTED    INFLUENZA A NOT DETECTED NOT DETECTED    INFLUENZA B NOT DETECTED NOT DETECTED   CBC with Auto Differential   Result Value Ref Range    WBC 8.4 4.0 - 11.0 K/uL    RBC 3.34 (L) 4.20 - 5.90 M/uL    Hemoglobin 10.9 (L) 13.5 - 17.5 g/dL    Hematocrit 33.2 (L) 40.5 - 52.5 %    MCV 99.2 80.0 - 100.0 fL    MCH 32.6 26.0 - 34.0 pg    MCHC 32.9 31.0 - 36.0 g/dL    RDW 18.7 (H) 12.4 - 15.4 %    Platelets 725 761 - 016 K/uL    MPV 10.8 (H) 5.0 - 10.5 fL    Neutrophils % 88.9 %    Lymphocytes % 7.4 %    Monocytes % 3.1 %    Eosinophils % 0.4 %    Basophils % 0.2 %    Neutrophils Absolute 7.5 1.7 - 7.7 K/uL    Lymphocytes Absolute 0.6 (L) 1.0 - 5.1 K/uL    Monocytes Absolute 0.3 0.0 - 1.3 K/uL    Eosinophils Absolute 0.0 0.0 - 0.6 K/uL    Basophils Absolute 0.0 0.0 - 0.2 K/uL   Basic Metabolic Panel   Result Value Ref Range    Sodium 134 (L) 136 - 145 mmol/L    Potassium 3.3 (L) 3.5 - 5.1 mmol/L    Chloride 91 (L) 99 - 110 mmol/L    CO2 32 21 - 32 mmol/L    Anion Gap 11 3 - 16    Glucose 119 (H) 70 - 99 mg/dL    BUN 11 7 - 20 mg/dL    Creatinine 0.9 0.9 - 1.3 mg/dL    Est, Glom Filt Rate >60 >60    Calcium 8.5 8.3 - 10.6 mg/dL   Troponin   Result Value Ref Range    Troponin <0.01 <0.01 ng/mL   Blood Gas, Venous   Result Value Ref Range    pH, Usman 7.459 (H) 7.350 - 7.450    pCO2, Usmna 48.8 41.0 - 51.0 mmHg    pO2, Usman 33.2 25.0 - 40.0 mmHg    HCO3, Venous 34.6 (H) 24.0 - 28.0 mmol/L    Base Excess, Usman 8.4 (H) -2.0 - 3.0 mmol/L    O2 Sat, Usman 56 Not established %    Carboxyhemoglobin 1.4 0.0 - 1.5 %    MetHgb, Usman <0.0 0.0 - 1.5 %    TC02 (Calc), Usman 36 mmol/L    Hemoglobin, Usman, Reduced 43.70 %   Lactic Acid   Result Value Ref Range    Lactic Acid 1.7 0.4 - 2.0 mmol/L   Procalcitonin   Result Value Ref Range    Procalcitonin 0.19 (H) 0.00 - 0.15 ng/mL   Brain Natriuretic Peptide   Result Value Ref Range    Pro-BNP 4,462 (H) 0 - 124 pg/mL     EKG   EKG as interpreted by me shows patient to be in a sinus tachycardic rhythm with a rate of 103, left axis deviation, normal DC and QT interval's, normal QRS duration, no ST segment abnormalities, no T wave abnormalities. ED BEDSIDE ULTRASOUND:  No results found. MOST RECENT VITALS:  BP: (!) 150/97,Temp: 97.7 °F (36.5 °C), Heart Rate: 92, Resp: 29, SpO2: 100 %     Procedures     N/A    ED Course     Nursing Notes, Past Medical Hx, Past Surgical Hx, Social Hx,Allergies, and Family Hx were reviewed.          The patient was given the following medications:  Orders Placed This Encounter   Medications    ipratropium-albuterol (DUONEB) nebulizer solution 1 ampule     Order Specific Question:   Initiate RT Bronchodilator Protocol     Answer:   No    iopamidol (ISOVUE-370) 76 % injection 80 mL    furosemide (LASIX) injection 40 mg    sodium chloride flush 0.9 % injection 5-40 mL    sodium chloride flush 0.9 % injection 5-40 mL    0.9 % sodium chloride infusion    OR Linked Order Group     ondansetron (ZOFRAN-ODT) disintegrating tablet 4 mg     ondansetron (ZOFRAN) injection 4 mg    polyethylene glycol (GLYCOLAX) packet 17 g    OR Linked Order Group     acetaminophen (TYLENOL) tablet 650 mg     acetaminophen (TYLENOL) suppository 650 mg    DISCONTD: enoxaparin (LOVENOX) injection 40 mg Order Specific Question:   Indication of Use     Answer:   Prophylaxis-DVT/PE    OR Linked Order Group     potassium chloride (KLOR-CON M) extended release tablet 40 mEq     potassium bicarb-citric acid (EFFER-K) effervescent tablet 40 mEq     potassium chloride 10 mEq/100 mL IVPB (Peripheral Line)    magnesium sulfate 2000 mg in 50 mL IVPB premix    perflutren lipid microspheres (DEFINITY) injection 1.5 mL    aluminum & magnesium hydroxide-simethicone (MAALOX) 200-200-20 MG/5ML suspension 30 mL    valsartan (DIOVAN) tablet 40 mg    furosemide (LASIX) injection 40 mg    carvedilol (COREG) tablet 3.125 mg    potassium chloride (KLOR-CON M) extended release tablet 20 mEq    apixaban (ELIQUIS) tablet 5 mg     Order Specific Question:   Indication of Use     Answer:   Treatment-DVT/PE    aspirin chewable tablet 81 mg    methadone (DOLOPHINE) tablet 45 mg    methocarbamol (ROBAXIN) tablet 500 mg       CONSULTS:  IP CONSULT TO HOSPITALIST  IP CONSULT TO HEART FAILURE NURSE/COORDINATOR  IP CONSULT TO DIETITIAN  IP CONSULT TO CARDIOLOGY    Review of Systems     Review of Systems   Constitutional: Negative. HENT: Negative. Eyes: Negative. Respiratory:  Positive for cough and shortness of breath. Cardiovascular:  Positive for chest pain and leg swelling. Gastrointestinal: Negative. Genitourinary: Negative. Musculoskeletal: Negative. Neurological: Negative. All other systems reviewed and are negative. Past Medical, Surgical, Family, and Social History     He has no past medical history on file. He has no past surgical history on file. His family history is not on file. He reports that he has been smoking. He has been smoking an average of .5 packs per day. He has never used smokeless tobacco. He reports that he does not currently use alcohol.     Medications     Previous Medications    APIXABAN (ELIQUIS) 5 MG TABS TABLET    Take 5 mg by mouth 2 times daily    ASPIRIN 81 MG CHEWABLE TABLET Take 81 mg by mouth daily    METHADONE (DOLOPHINE) 10 MG TABLET    Take 45 mg by mouth daily. METHOCARBAMOL (ROBAXIN) 500 MG TABLET    Take 1 tablet by mouth 3 times daily    NAPROXEN (NAPROSYN) 500 MG TABLET    Take 1 tablet by mouth 2 times daily       Allergies     He has No Known Allergies. Physical Exam     INITIAL VITALS: BP: 130/87, Temp: 97.7 °F (36.5 °C), Heart Rate: 88, Resp: 24, SpO2: 94 %   Physical Exam  Vitals and nursing note reviewed. Constitutional:       General: He is in acute distress. Comments: Patient with increased work of breathing. Appears chronically ill. HENT:      Mouth/Throat:      Mouth: Mucous membranes are dry. Pharynx: No oropharyngeal exudate. Eyes:      General: No scleral icterus. Extraocular Movements: Extraocular movements intact. Conjunctiva/sclera: Conjunctivae normal.      Pupils: Pupils are equal, round, and reactive to light. Cardiovascular:      Rate and Rhythm: Regular rhythm. Tachycardia present. Heart sounds: Normal heart sounds. Pulmonary:      Effort: Tachypnea and respiratory distress present. Breath sounds: Examination of the right-lower field reveals decreased breath sounds. Examination of the left-lower field reveals rhonchi. Decreased breath sounds and rhonchi present. Abdominal:      General: Bowel sounds are normal.      Palpations: Abdomen is soft. Tenderness: There is no abdominal tenderness. There is no guarding or rebound. Musculoskeletal:         General: Normal range of motion. Cervical back: Normal range of motion and neck supple. Right lower leg: Edema present. Left lower leg: Edema present. Skin:     General: Skin is warm and dry. Neurological:      General: No focal deficit present. Mental Status: He is alert and oriented to person, place, and time. Cranial Nerves: No cranial nerve deficit. Motor: No weakness.       Coordination: Coordination normal. Goldie Hernadez MD  02/08/23 2255

## 2023-02-08 NOTE — ED NOTES
Pt on NRB attempted to switch to nasal cannula. Pt 80% on 6L.   Switched to [id number] Patricia, RN  02/08/23 7871

## 2023-02-08 NOTE — ED NOTES
ED TO INPATIENT SBAR HANDOFF    Patient Name: Agustín Segura   :  1975  56 y.o. MRN:  [id number]  Preferred Name  aDmion Parker  ED Room #:  A77/N82-79  Family/Caregiver Present no   Restraints no   Sitter no   Sepsis Risk Score Sepsis Risk Score: 1.06    Situation  Code Status: Full Code Limited Code details: Intubation/Re-intubation No Comment; Defibrillation/Cardioversion No Comment; Chest Compressions No Comment; Resuscitative Medications No Comment; Other No Comment  . Allergies: Patient has no known allergies. Weight: No data found. Arrived from: home  Chief Complaint:   Chief Complaint   Patient presents with    Shortness of Breath     Pt complaining of sob x6 days. Was brought in on a NRB and 94%. States he has not been taking his blood thinner as he should     Hospital Problem/Diagnosis:  Principal Problem:    Acute heart failure, unspecified heart failure type ([facility] [address]  Resolved Problems:    * No resolved hospital problems. *    Imaging:   CT CHEST PULMONARY EMBOLISM W CONTRAST   Final Result      Linear, eccentric, and left leg filling defects in the right lower lobe segmental and subsegmental pulmonary arteries are suggestive of chronic pulmonary emboli. No definite acute pulmonary emboli identified. Extensive groundglass opacification of both lungs is suspicious for atypical pneumonia although a component of asymmetric pulmonary edema could have a similar appearance. Large complex loculated right pleural fluid collection. Recommend diagnostic thoracentesis, as hemothorax or malignant effusion cannot be excluded. Trace left pleural effusion. INCIDENTAL FINDINGS: None. XR CHEST PORTABLE   Final Result      Extensive bilateral airspace disease, which may represent pneumonia or pulmonary edema. Cardiomegaly. Moderate to large right and questionable small left pleural effusions.         Abnormal labs:   Abnormal Labs Reviewed   CBC WITH AUTO DIFFERENTIAL - Abnormal; Notable for the following components:       Result Value    RBC 3.34 (*)     Hemoglobin 10.9 (*)     Hematocrit 33.2 (*)     RDW 18.7 (*)     MPV 10.8 (*)     Lymphocytes Absolute 0.6 (*)     All other components within normal limits   BASIC METABOLIC PANEL - Abnormal; Notable for the following components:    Sodium 134 (*)     Potassium 3.3 (*)     Chloride 91 (*)     Glucose 119 (*)     All other components within normal limits   BLOOD GAS, VENOUS - Abnormal; Notable for the following components:    pH, Usman 7.459 (*)     HCO3, Venous 34.6 (*)     Base Excess, Usman 8.4 (*)     All other components within normal limits   PROCALCITONIN - Abnormal; Notable for the following components:    Procalcitonin 0.19 (*)     All other components within normal limits   BRAIN NATRIURETIC PEPTIDE - Abnormal; Notable for the following components:    Pro-BNP 4,462 (*)     All other components within normal limits     Critical values: no     Abnormal Assessment Findings: hypoxic     Background  History: No past medical history on file.     Assessment    Vitals/MEWS:    Level of Consciousness: Alert (0)   Vitals:    02/07/23 2343 02/07/23 2344 02/08/23 0013 02/08/23 0030   BP: 130/87   (!) 150/97   Pulse: 88  98 92   Resp:  24 14 29   Temp: 97.7 °F (36.5 °C)      TempSrc: Oral      SpO2: 94%  94% 100%     FiO2 (%): increased work of breathing  O2 Flow Rate: O2 Device: PAP (positive airway pressure) O2 Flow Rate (L/min): 15 L/min  Cardiac Rhythm:    Pain Assessment: 0   [x] Verbal [] Nathaniel Moan Scale  Pain Scale: Pain Assessment  Pain Assessment: None - Denies Pain  Last documented pain score (0-10 scale)    Last documented pain medication administered: na  Mental Status: oriented  NIH Score:    C-SSRS: Risk of Suicide: No Risk  Bedside swallow:    McMillan Coma Scale (GCS): Antonio Coma Scale  Eye Opening: Spontaneous  Best Verbal Response: Oriented  Best Motor Response: Obeys commands  Antonio Coma Scale Score: 15  Active LDA's: Peripheral IV 02/08/23 Left;Proximal Forearm (Active)     PO Status: Regular  Pertinent or High Risk Medications/Drips: no   o If Yes, please provide details: no  Pending Blood Product Administration: no     You may also review the ED PT Care Timeline found under the Summary Nursing Index tab. Recommendation    Pending orders admit orders entered  Plan for Discharge (if known): Additional Comments: Pt came in for SOB with history of clots has not been taking medication as scheduled. Started taking eliquis again 6 days ago when he started with the SOB. Pt is on 15L high flow oxygen. Was tried on NC 6L and was 80%. That time he was put back on a NRB and was tried on bipap but could not tolerate.   Is now 100% on his 15L  If any further questions, please call Sending RN at [phone]    Electronically signed by: Electronically signed by Jill Galvan RN on 2/8/2023 at 3:40 AM       Jill Galvan RN  02/08/23 7168

## 2023-02-08 NOTE — PROGRESS NOTES
4 Eyes Admission Assessment     I agree as the admission nurse that 2 RN's have performed a thorough Head to Toe Skin Assessment on the patient. ALL assessment sites listed below have been assessed on admission. Areas assessed by both nurses:   [x]   Head, Face, and Ears   [x]   Shoulders, Back, and Chest  [x]   Arms, Elbows, and Hands   [x]   Coccyx, Sacrum, and Ischium         - stage 1 red-purple color, moist mid buttocks  [x]   Legs, Feet, and Heels    - scattered abrasions, scars and ecchymosis        Does the Patient have Skin Breakdown?   Yes a wound was noted on the Admission Assessment and an LDA was Initiated documentation include the Selina-wound, Wound Assessment, Measurements, Dressing Treatment, Drainage, and Color\",         Thanh Prevention initiated:  No   Wound Care Orders initiated:  No      [facility] nurse consulted for Pressure Injury (Stage 3,4, Unstageable, DTI, NWPT, and Complex wounds) or Thanh score 18 or lower:  No      Nurse 1 eSignature: Electronically signed by Maine Dorantes RN on 2/8/23 at 6:25 AM EST    **SHARE this note so that the co-signing nurse is able to place an eSignature**    Nurse 2 eSignature: Electronically signed by Naveen Ramírez RN on 2/8/23 at 6:26 AM EST

## 2023-02-08 NOTE — H&P
Internal Medicine  PGY 1  History & Physical      CC SOB    History Obtained From:  patient    HISTORY OF PRESENT ILLNESS:  Funmilayo Walden 55 yo M w/ pmhx of chronic PE on Eliquis, IVDU, Hep C, COPD, HTN who presents to the ED with complaints of SOB for the past 6 or 7 days. Pt has had a nonproductive cough as well. Pt denies chest pain, nausea, vomiting, changes in bowel habits. The pt stated that last year he went to the ED for acute onset chest pain and shortness of breath due to a saddle pulmonary embolism. Pt was placed on eliquis. ECHO was done during the same stay and showed an EF of 60-65% and was unable to determine if there was any pulmonary hypertension. Pt was an IVDU and is not using anymore and is taking methadone and is positive for hep C that has been treated. Pt does not look volume overloaded and is not edematous in his extremities. On CT PE, pt has continued chronic pulmonary emboli with no sign of acute PE. There was extensive groundglass opacities of both lungs with more of a pulmonary edema picture. As well as right pleural fluid collection. Cannot exclude hemothorax or malignant effusion. Pt is being admitted for work up for new onset HF as well as diagnostic tap of the pleural fluid. Labs: K 3.3, procal 0.19, BNP 4462, trop <0.01, wbc wnl  Pleural Effusions found on CXR and CT PE    Past Medical History:    No past medical history on file. Past Surgical History:    No past surgical history on file. Medications Priorto Admission:    Medications Prior to Admission: apixaban (ELIQUIS) 5 MG TABS tablet, Take 5 mg by mouth 2 times daily  methadone (DOLOPHINE) 10 MG tablet, Take 45 mg by mouth daily.   aspirin 81 MG chewable tablet, Take 81 mg by mouth daily  naproxen (NAPROSYN) 500 MG tablet, Take 1 tablet by mouth 2 times daily (Patient not taking: Reported on 2/8/2023)  methocarbamol (ROBAXIN) 500 MG tablet, Take 1 tablet by mouth 3 times daily (Patient not taking: Reported on 2/8/2023)    Allergies:  Patient has no known allergies. Social History:   TOBACCO:   reports that he has been smoking. He has been smoking an average of .5 packs per day. He has never used smokeless tobacco.  ETOH:   reports that he does not currently use alcohol. DRUGS : None  Patient currently lives alone    Family History:   No family history on file. Review of Systems   Constitutional: Negative. HENT: Negative. Respiratory:  Positive for cough and shortness of breath. Cardiovascular: Negative. Gastrointestinal: Negative. Genitourinary: Negative. Musculoskeletal: Negative. Neurological: Negative. ROS: A 10 point review of systems was conducted, significant findings as noted in HPI. Physical Exam  Vitals and nursing note reviewed. Constitutional:       General: He is in acute distress. Appearance: He is ill-appearing. HENT:      Head: Normocephalic and atraumatic. Eyes:      Pupils: Pupils are equal, round, and reactive to light. Cardiovascular:      Rate and Rhythm: Normal rate and regular rhythm. Pulses: Normal pulses. Heart sounds: Normal heart sounds. Pulmonary:      Effort: Respiratory distress present. Breath sounds: Rhonchi present. Abdominal:      General: Abdomen is flat. Palpations: Abdomen is soft. Musculoskeletal:         General: No swelling. Skin:     General: Skin is warm and dry. Neurological:      General: No focal deficit present. Mental Status: He is alert. Physical exam:       Vitals:    02/08/23 0447   BP: 132/83   Pulse: 86   Resp: 26   Temp: 97.8 °F (36.6 °C)   SpO2: 97%       DATA:    Labs:  CBC:   Recent Labs     02/08/23  0024   WBC 8.4   HGB 10.9*   HCT 33.2*          BMP:   Recent Labs     02/08/23  0024   *   K 3.3*   CL 91*   CO2 32   BUN 11   CREATININE 0.9   GLUCOSE 119*     LFT's: No results for input(s): AST, ALT, ALB, BILITOT, ALKPHOS in the last 72 hours.   Troponin:   Recent Labs     02/08/23  0024   TROPONINI <0.01     BNP:No results for input(s): BNP in the last 72 hours. ABGs: No results for input(s): PHART, RQM4RAV, PO2ART in the last 72 hours. INR: No results for input(s): INR in the last 72 hours. U/A:No results for input(s): NITRITE, COLORU, PHUR, LABCAST, WBCUA, RBCUA, MUCUS, TRICHOMONAS, YEAST, BACTERIA, CLARITYU, SPECGRAV, LEUKOCYTESUR, UROBILINOGEN, BILIRUBINUR, BLOODU, GLUCOSEU, AMORPHOUS in the last 72 hours. Invalid input(s): KETONESU    CT CHEST PULMONARY EMBOLISM W CONTRAST   Final Result      Linear, eccentric, and left leg filling defects in the right lower lobe segmental and subsegmental pulmonary arteries are suggestive of chronic pulmonary emboli. No definite acute pulmonary emboli identified. Extensive groundglass opacification of both lungs is suspicious for atypical pneumonia although a component of asymmetric pulmonary edema could have a similar appearance. Large complex loculated right pleural fluid collection. Recommend diagnostic thoracentesis, as hemothorax or malignant effusion cannot be excluded. Trace left pleural effusion. INCIDENTAL FINDINGS: None. XR CHEST PORTABLE   Final Result      Extensive bilateral airspace disease, which may represent pneumonia or pulmonary edema. Cardiomegaly. Moderate to large right and questionable small left pleural effusions. ASSESSMENT AND PLAN:  Edna Munguia 55 yo M w/ pmhx of chronic PE on Eliquis, IVDU, Hep C, COPD, HTN who presents to the ED with complaints of SOB for the past 6 or 7 days. Pt has had a nonproductive cough as well. Pt denies chest pain, nausea, vomiting, changes in bowel habits. New onset Heart Failure  Pt has acute onset shortness of breath that began 6-7 days ago. CT PE shows significant pleural effusions on the right side as well a ground glass opacities. BNP 4462.   - Cards consult  - IV Lasix 40mg BID  - ECHO pending  - consult pulmonology for diagnostic tap  - Monitor I/O's  - Daily Weights  - Monitor K+, Mg+    IVDU  Pt states that he has not used in a long time and is decreasing the dose of methadone  - 45mg Methadone    Chronic PE   CT PE does not show a new PE  - Eliquis  - ASA    HTN  - home coreg  - home valsartan       Will discuss with attending physician Dr. Camille Olszewski, MD    Code Status:Full code  FEN: Regular  PPX: Eliquis  DISPO: Khushboo Martinez DO  2/8/2023,  5:06 AM

## 2023-02-08 NOTE — PLAN OF CARE
Problem: ABCDS Injury Assessment  Goal: Absence of physical injury  2/8/2023 1257 by Jonathon Moctezuma RN  Outcome: Progressing  2/8/2023 0628 by Laith Ivey RN  Outcome: Progressing  Flowsheets (Taken 2/8/2023 6365)  Absence of Physical Injury: Implement safety measures based on patient assessment

## 2023-02-08 NOTE — ED NOTES
After breathing treatment pt unable to sat about 86% on 15L high flow. Switched back to NRB. Pt came back to 100% after a few minutes on it.   MD moore will started on BiPAP     Heath Bailey RN  02/08/23 0046

## 2023-02-08 NOTE — CONSULTS
Reason for Consultation/Chief Complaint: SOB    History of Present Illness:  Avery Templeton is a 56 y.o. patient whom we were asked to see for CHF>   sob over past 5-6 days. Notes no chest pain. Uncertain wt gain. No cardiac hx  Hx PE. Notes PND. No orthopnea. No palp/tachy/syncope. Also notes cough. Hs hx of PE with saddle embolus on AC. Not always compliant with AC. Hx IVDU. On methadone. No recent drug use. Past Medical History:   has a past medical history of htn, IVDU (intravenous drug user), and PE (pulmonary thromboembolism) ([facility] [address] Surgical History:   has no past surgical history on file. Social History:   reports that he has been smoking. He has been smoking an average of .5 packs per day. He has never used smokeless tobacco. He reports that he does not currently use alcohol. He reports that he does not currently use drugs after having used the following drugs: Opiates . Family History:  No evidence for sudden cardiac death or premature CAD    Home Medications:  Were reviewed and are listed in nursing record. and/or listed below  Prior to Admission medications    Medication Sig Start Date End Date Taking? Authorizing Provider   apixaban (ELIQUIS) 5 MG TABS tablet Take 5 mg by mouth 2 times daily   Yes Historical Provider, MD   methadone (DOLOPHINE) 10 MG tablet Take 45 mg by mouth daily. Yes Historical Provider, MD   aspirin 81 MG chewable tablet Take 81 mg by mouth daily    Historical Provider, MD   naproxen (NAPROSYN) 500 MG tablet Take 1 tablet by mouth 2 times daily  Patient not taking: Reported on 2/8/2023 3/29/19   ISHA Holguin CNP   methocarbamol (ROBAXIN) 500 MG tablet Take 1 tablet by mouth 3 times daily  Patient not taking: Reported on 2/8/2023 3/29/19   ISHA Holguin CNP        Allergies:  Patient has no known allergies. Review of Systems:       Constitutional: there has been no unanticipated weight loss.  There's been no change in energy level, sleep pattern, or activity level. Eyes: No visual changes or diplopia. No scleral icterus. ENT: No Headaches, hearing loss or vertigo. No mouth sores or sore throat. Cardiovascular: No loss of consciousness. No hemoptysis, pleuritic pain, or phlebitis. Respiratory: As above. Gastrointestinal: No abdominal pain, appetite loss, blood in stools. No change in bowel or bladder habits. Genitourinary: No dysuria, trouble voiding, or hematuria. Musculoskeletal:  No gait disturbance, weakness or joint complaints. Integumentary: No rash or pruritis. All other systems reviewed negative as done.      Physical Examination:    Vitals:    02/08/23 1016   BP:    Pulse: 82   Resp:    Temp:    SpO2:     Weight: 222 lb 3.6 oz (100.8 kg)         General Appearance:  Alert, cooperative, no distress, appears stated age   Head:  Normocephalic, without obvious abnormality, atraumatic   Eyes:  PERRL, conjunctiva/corneas clear       Nose: Nares normal, no drainage or sinus tenderness   Throat: Lips, mucosa, and tongue normal   Neck: Supple, symmetrical, trachea midline       Lungs:   Fine crackles throughout, respirations unlabored   Chest Wall:  No tenderness or deformity   Heart:  Regular rate and rhythm, S1, S2 normal, no murmur, rub or gallop   Abdomen:   Soft, non-tender, bowel sounds active all four quadrants,             Extremities: Extremities normal, atraumatic, no cyanosis or edema       Skin: Skin color, texture, turgor normal, no rashes or lesions   Pysch: Normal mood and affect   Neurologic: Normal gross motor and sensory exam.         Labs  CBC:   Lab Results   Component Value Date/Time    WBC 8.4 02/08/2023 12:24 AM    RBC 3.34 02/08/2023 12:24 AM    HGB 10.9 02/08/2023 12:24 AM    HCT 33.2 02/08/2023 12:24 AM    MCV 99.2 02/08/2023 12:24 AM    RDW 18.7 02/08/2023 12:24 AM     02/08/2023 12:24 AM     CMP:    Lab Results   Component Value Date/Time     02/08/2023 05:19 AM    K 3.6 02/08/2023 05:19 AM CL 90 02/08/2023 05:19 AM    CO2 31 02/08/2023 05:19 AM    BUN 11 02/08/2023 05:19 AM    CREATININE 1.0 02/08/2023 05:19 AM    LABGLOM >60 02/08/2023 05:19 AM    GLUCOSE 100 02/08/2023 05:19 AM    CALCIUM 8.6 02/08/2023 05:19 AM     PT/INR:  No results found for: PTINR  Lab Results   Component Value Date    TROPONINI <0.01 02/08/2023       EKG:  I have reviewed EKG with the following interpretation:  Impression:  personally reviewed, ST, NSSTTW changes. Assessment  Patient Active Problem List   Diagnosis    Acute heart failure, unspecified heart failure type (HCC)    Normocytic anemia    Chronic anticoagulation    HTN (hypertension)    Opioid dependence on agonist therapy ([facility] [address]         Plan:    SOB/PND, rales, elevated BNP consistent with CHF. No previous cardiac hx.  EF unknown. Aggressive diuresis. Negative Flu/COVID>  Echo to assess LV function/diastolic function. Watch cr. Further evaluation upon compensation of CHF. B blocker. ARB has been started.

## 2023-02-08 NOTE — PROGRESS NOTES
Progress Note    Admit Date: 2/7/2023  Day: 1  Diet: ADULT DIET; Regular; No Added Salt (3-4 gm)    CC: SOB     Interval history: This morning patient continues to affirm SOB with improvement on O2 compared to at home. Patient does not have baseline O2 requirement. Patient denies CP, SOB, palps, increase swelling, n/v, HA, f/c. Affirm: non-productive cough    HPI:     Patient is a 55 yo M with PMHx of chronic PE on Eliquis, IVDU, Hep C, and COPD (GOLD 2) presenting to ED with about a week of progressively worsening SOB w/ non-productive cough. On day of presentation patient was unable to perform ADLs and decided he needed to come in. Pt denies chest pain, nausea, vomiting or changes in bowel habits. States had has trace leg swelling since PE last year. Has not noticed any acute changes in this. States that he has difficultly breathing when lying on his back and at night. Patient with hx of chronic saddle PE (3/2022). Echo at the time with 60-65% and was unable to determine if there was any pulmonary hypertension. Pt was an IVDU and is not using anymore and is taking methadone and is positive for hep C that has been treated.        Medications:     Scheduled Meds:   sodium chloride flush  5-40 mL IntraVENous 2 times per day    valsartan  40 mg Oral 2 times per day    furosemide  40 mg IntraVENous BID    carvedilol  3.125 mg Oral BID WC    potassium chloride  20 mEq Oral BID WC    [Held by provider] apixaban  5 mg Oral BID    aspirin  81 mg Oral Daily    methadone  45 mg Oral Daily     Continuous Infusions:   sodium chloride       PRN Meds:sodium chloride flush, sodium chloride, ondansetron **OR** ondansetron, polyethylene glycol, acetaminophen **OR** acetaminophen, potassium chloride **OR** potassium alternative oral replacement **OR** potassium chloride, magnesium sulfate, perflutren lipid microspheres, aluminum & magnesium hydroxide-simethicone    Objective:   Vitals:   T-max:  Patient Vitals for the past 8 hrs:   BP Temp Temp src Pulse Resp SpO2 Height Weight   02/08/23 1016 -- -- -- 82 -- -- -- --   02/08/23 0925 117/81 98.1 °F (36.7 °C) Oral 89 26 96 % -- --   02/08/23 0559 -- -- -- -- -- 95 % -- --   02/08/23 0552 -- -- -- -- -- 90 % -- --   02/08/23 0549 -- -- -- 76 -- 95 % -- --   02/08/23 0522 -- -- -- -- -- 96 % -- --   02/08/23 0447 132/83 97.8 °F (36.6 °C) Oral 86 26 97 % -- --   02/08/23 0445 -- -- -- -- -- -- 5' 10\" (1.778 m) 222 lb 3.6 oz (100.8 kg)       Intake/Output Summary (Last 24 hours) at 2/8/2023 1044  Last data filed at 2/8/2023 1021  Gross per 24 hour   Intake 480 ml   Output 320 ml   Net 160 ml       Review of Systems   Constitutional:  Positive for activity change and fatigue. Negative for fever. Respiratory:  Positive for cough and shortness of breath. Cardiovascular:  Negative for chest pain. Legs swelling is the same as it usually is    Neurological:  Negative for headaches. Physical Exam  Constitutional:       Appearance: Normal appearance. He is obese. HENT:      Nose: No congestion. Mouth/Throat:      Mouth: Mucous membranes are moist.   Eyes:      Pupils: Pupils are equal, round, and reactive to light. Cardiovascular:      Rate and Rhythm: Normal rate and regular rhythm. Heart sounds: Normal heart sounds. Pulmonary:      Comments: Increased WOB, patient with right sided (lateal and lower lobe) crackles. Occasionally diffuse wheeze on auscultation. Chest:      Chest wall: No tenderness. Abdominal:      General: Bowel sounds are normal.      Palpations: Abdomen is soft. Tenderness: There is no abdominal tenderness. Comments: No able to appreciate JVD on examination     Musculoskeletal:      Comments: Patient with trace bilateral edema in calves and ankles. Skin:     General: Skin is warm and dry. Neurological:      Mental Status: He is alert.        LABS:    CBC:   Recent Labs     02/08/23  0024   WBC 8.4   HGB 10.9*   HCT 33.2*    MCV 99.2     Renal:    Recent Labs     02/08/23  0024 02/08/23 0519   * 133*   K 3.3* 3.6   CL 91* 90*   CO2 32 31   BUN 11 11   CREATININE 0.9 1.0   GLUCOSE 119* 100*   CALCIUM 8.5 8.6   MG  --  2.10   ANIONGAP 11 12     Hepatic: No results for input(s): AST, ALT, BILITOT, BILIDIR, PROT, LABALBU, ALKPHOS in the last 72 hours. Troponin:   Recent Labs     02/08/23  0024 02/08/23 0519   TROPONINI <0.01 <0.01     BNP: No results for input(s): BNP in the last 72 hours. Lipids: No results for input(s): CHOL, HDL in the last 72 hours. Invalid input(s): LDLCALCU, TRIGLYCERIDE  ABGs:  No results for input(s): PHART, CYZ9BUS, PO2ART, QXD5RMC, BEART, THGBART, N0WFBHSA, VTM3YNE in the last 72 hours. INR: No results for input(s): INR in the last 72 hours. Lactate: No results for input(s): LACTATE in the last 72 hours. Cultures:  -----------------------------------------------------------------  RAD:   CT CHEST PULMONARY EMBOLISM W CONTRAST   Final Result      Linear, eccentric, and left leg filling defects in the right lower lobe segmental and subsegmental pulmonary arteries are suggestive of chronic pulmonary emboli. No definite acute pulmonary emboli identified. Extensive groundglass opacification of both lungs is suspicious for atypical pneumonia although a component of asymmetric pulmonary edema could have a similar appearance. Large complex loculated right pleural fluid collection. Recommend diagnostic thoracentesis, as hemothorax or malignant effusion cannot be excluded. Trace left pleural effusion. INCIDENTAL FINDINGS: None. XR CHEST PORTABLE   Final Result      Extensive bilateral airspace disease, which may represent pneumonia or pulmonary edema. Cardiomegaly. Moderate to large right and questionable small left pleural effusions.           Assessment/Plan:     Avery Templeton 55 yo M w/ pmhx of chronic PE on Eliquis, IVDU, Hep C, COPD, HTN who presents to the ED with complaints of SOB for the past 6 or 7 days. Pt has had a nonproductive cough as well. Pt denies chest pain, nausea, vomiting, changes in bowel habits.    #Acute Hypoxic Respiratory Failure  #Suspect New onset Heart Failure  #Pulmonary Fluid Collection   #COPD   Pt has acute onset shortness of breath that began 6-7 days ago. CT PE shows significant pleural effusions on the right side as well a ground glass opacities. BNP 4462.  6/2022 - patient weight is ~240. Patient this admission is ~225 (down 15 lbs)   Patient unimpressive infectious workup (nl WBC, barely elevated pro alba .19, afebrile).   Trop < .01  New O2 requirement (15 HFNC on admission)   - Cards consult  - IV Lasix 40mg BID  - f/u ECHO result   - consult pulmonology for diagnostic tap  - Monitor strict I/O's  - Daily Weights  - Monitor K+, Mg+  - Duonebs   - wean O2 as tolerated      #Chronic PE   CT PE does not show a new PE  Previous admission with + hypercoag workup for showing +homocysteine.   - Holding Eliquis in context of potential pulm procedure.   - ASA  - B12, folate, and MMA     #Hx of IVDU  Pt states that he has not used in a long time and is decreasing the dose of methadone  - 45mg Methadone     #HTN  - home coreg  - home valsartan     Code Status:Full code  FEN: Regular  PPX: Eliquis held for procedure  DISPO: IP      As transcribed by Isaiah Pineda, MS4  02/08/23  10:44 AM    As scribed for Julio Carias MD PGY-1    This patient has been staffed and discussed with Michael Mullins MD.

## 2023-02-08 NOTE — ED NOTES
Unable to tolerate bipap.   Tried to both the full and the nasal mask unable to leave either on.  transitionted back to 15L high flow     Analia Morejon RN  02/08/23 3439

## 2023-02-08 NOTE — PLAN OF CARE
Problem: Discharge Planning  Goal: Discharge to home or other facility with appropriate resources  Outcome: Progressing  Flowsheets (Taken 2/8/2023 2982)  Discharge to home or other facility with appropriate resources: Identify barriers to discharge with patient and caregiver  Note: On 6 liter high flow nasal cannula     Problem: Safety - Adult  Goal: Free from fall injury  Outcome: Progressing  Flowsheets (Taken 2/8/2023 0692)  Free From Fall Injury:   Instruct family/caregiver on patient safety   Based on caregiver fall risk screen, instruct family/caregiver to ask for assistance with transferring infant if caregiver noted to have fall risk factors  Note: Fall prevention protocol in place. Call light kept within reach. Calls/need attended. Bad alarm ON. Room clutters removed.      Problem: ABCDS Injury Assessment  Goal: Absence of physical injury  Outcome: Progressing  Flowsheets (Taken 2/8/2023 8173)  Absence of Physical Injury: Implement safety measures based on patient assessment

## 2023-02-09 ENCOUNTER — APPOINTMENT (OUTPATIENT)
Dept: GENERAL RADIOLOGY | Age: 48
DRG: 166 | End: 2023-02-09
Payer: MEDICAID

## 2023-02-09 LAB
ALBUMIN FLUID: 2 G/DL
AMPHETAMINE SCREEN, URINE: ABNORMAL
ANION GAP SERPL CALCULATED.3IONS-SCNC: 14 MMOL/L (ref 3–16)
BARBITURATE SCREEN URINE: ABNORMAL
BASOPHILS ABSOLUTE: 0.1 K/UL (ref 0–0.2)
BASOPHILS RELATIVE PERCENT: 0.6 %
BENZODIAZEPINE SCREEN, URINE: ABNORMAL
BUN BLDV-MCNC: 16 MG/DL (ref 7–20)
C-REACTIVE PROTEIN: 85.1 MG/L (ref 0–5.1)
CALCIUM SERPL-MCNC: 8.6 MG/DL (ref 8.3–10.6)
CANNABINOID SCREEN URINE: ABNORMAL
CHLORIDE BLD-SCNC: 90 MMOL/L (ref 99–110)
CO2: 29 MMOL/L (ref 21–32)
COCAINE METABOLITE SCREEN URINE: ABNORMAL
CREAT SERPL-MCNC: 1.1 MG/DL (ref 0.9–1.3)
EOSINOPHILS ABSOLUTE: 0.2 K/UL (ref 0–0.6)
EOSINOPHILS RELATIVE PERCENT: 1.9 %
FENTANYL SCREEN, URINE: ABNORMAL
FERRITIN: 206.1 NG/ML (ref 30–400)
FLUID TYPE: NORMAL
GFR SERPL CREATININE-BSD FRML MDRD: >60 ML/MIN/{1.73_M2}
GLUCOSE BLD-MCNC: 87 MG/DL (ref 70–99)
GLUCOSE, FLUID: 20 MG/DL
HCT VFR BLD CALC: 33.7 % (ref 40.5–52.5)
HCT VFR BLD CALC: 33.9 % (ref 40.5–52.5)
HEMOGLOBIN: 10.7 G/DL (ref 13.5–17.5)
IMMATURE RETIC FRACT: 0.51 (ref 0.21–0.37)
LACTATE DEHYDROGENASE: 645 U/L (ref 100–190)
LD, FLUID: 816 U/L
LYMPHOCYTES ABSOLUTE: 1.1 K/UL (ref 1–5.1)
LYMPHOCYTES RELATIVE PERCENT: 11.9 %
Lab: ABNORMAL
MAGNESIUM: 2.1 MG/DL (ref 1.8–2.4)
MCH RBC QN AUTO: 32.2 PG (ref 26–34)
MCHC RBC AUTO-ENTMCNC: 31.8 G/DL (ref 31–36)
MCV RBC AUTO: 101.2 FL (ref 80–100)
METHADONE SCREEN, URINE: POSITIVE
MONOCYTES ABSOLUTE: 0.3 K/UL (ref 0–1.3)
MONOCYTES RELATIVE PERCENT: 3 %
NEUTROPHILS ABSOLUTE: 7.4 K/UL (ref 1.7–7.7)
NEUTROPHILS RELATIVE PERCENT: 82.6 %
OPIATE SCREEN URINE: ABNORMAL
OXYCODONE URINE: ABNORMAL
PDW BLD-RTO: 18.8 % (ref 12.4–15.4)
PH UA: 6
PHENCYCLIDINE SCREEN URINE: ABNORMAL
PLATELET # BLD: 174 K/UL (ref 135–450)
PMV BLD AUTO: 10.6 FL (ref 5–10.5)
POTASSIUM SERPL-SCNC: 3.9 MMOL/L (ref 3.5–5.1)
PROCALCITONIN: 0.15 NG/ML (ref 0–0.15)
PROTEIN FLUID: 4.3 G/DL
RBC # BLD: 3.34 M/UL (ref 4.2–5.9)
RETICULOCYTE ABSOLUTE COUNT: 0.06 M/UL
RETICULOCYTE COUNT PCT: 1.71 % (ref 0.5–2.18)
RHEUMATOID FACTOR: <10 IU/ML
SEDIMENTATION RATE, ERYTHROCYTE: 85 MM/HR (ref 0–15)
SODIUM BLD-SCNC: 133 MMOL/L (ref 136–145)
WBC # BLD: 9 K/UL (ref 4–11)

## 2023-02-09 PROCEDURE — 85045 AUTOMATED RETICULOCYTE COUNT: CPT

## 2023-02-09 PROCEDURE — 32561 LYSE CHEST FIBRIN INIT DAY: CPT | Performed by: INTERNAL MEDICINE

## 2023-02-09 PROCEDURE — 85652 RBC SED RATE AUTOMATED: CPT

## 2023-02-09 PROCEDURE — 82728 ASSAY OF FERRITIN: CPT

## 2023-02-09 PROCEDURE — 6370000000 HC RX 637 (ALT 250 FOR IP): Performed by: STUDENT IN AN ORGANIZED HEALTH CARE EDUCATION/TRAINING PROGRAM

## 2023-02-09 PROCEDURE — 99233 SBSQ HOSP IP/OBS HIGH 50: CPT | Performed by: INTERNAL MEDICINE

## 2023-02-09 PROCEDURE — 85025 COMPLETE CBC W/AUTO DIFF WBC: CPT

## 2023-02-09 PROCEDURE — 86431 RHEUMATOID FACTOR QUANT: CPT

## 2023-02-09 PROCEDURE — 2580000003 HC RX 258: Performed by: INTERNAL MEDICINE

## 2023-02-09 PROCEDURE — 84145 PROCALCITONIN (PCT): CPT

## 2023-02-09 PROCEDURE — 83615 LACTATE (LD) (LDH) ENZYME: CPT

## 2023-02-09 PROCEDURE — 94761 N-INVAS EAR/PLS OXIMETRY MLT: CPT

## 2023-02-09 PROCEDURE — 86038 ANTINUCLEAR ANTIBODIES: CPT

## 2023-02-09 PROCEDURE — 6360000002 HC RX W HCPCS: Performed by: INTERNAL MEDICINE

## 2023-02-09 PROCEDURE — 71045 X-RAY EXAM CHEST 1 VIEW: CPT

## 2023-02-09 PROCEDURE — 83735 ASSAY OF MAGNESIUM: CPT

## 2023-02-09 PROCEDURE — 2060000000 HC ICU INTERMEDIATE R&B

## 2023-02-09 PROCEDURE — 6370000000 HC RX 637 (ALT 250 FOR IP): Performed by: INTERNAL MEDICINE

## 2023-02-09 PROCEDURE — 86702 HIV-2 ANTIBODY: CPT

## 2023-02-09 PROCEDURE — 36415 COLL VENOUS BLD VENIPUNCTURE: CPT

## 2023-02-09 PROCEDURE — 86140 C-REACTIVE PROTEIN: CPT

## 2023-02-09 PROCEDURE — 87390 HIV-1 AG IA: CPT

## 2023-02-09 PROCEDURE — 80061 LIPID PANEL: CPT

## 2023-02-09 PROCEDURE — 80048 BASIC METABOLIC PNL TOTAL CA: CPT

## 2023-02-09 PROCEDURE — 2700000000 HC OXYGEN THERAPY PER DAY

## 2023-02-09 PROCEDURE — 86701 HIV-1ANTIBODY: CPT

## 2023-02-09 RX ORDER — ENOXAPARIN SODIUM 100 MG/ML
40 INJECTION SUBCUTANEOUS DAILY
Status: DISCONTINUED | OUTPATIENT
Start: 2023-02-09 | End: 2023-02-13

## 2023-02-09 RX ORDER — IPRATROPIUM BROMIDE AND ALBUTEROL SULFATE 2.5; .5 MG/3ML; MG/3ML
1 SOLUTION RESPIRATORY (INHALATION) ONCE
Status: COMPLETED | OUTPATIENT
Start: 2023-02-10 | End: 2023-02-10

## 2023-02-09 RX ADMIN — SODIUM CHLORIDE, PRESERVATIVE FREE 10 ML: 5 INJECTION INTRAVENOUS at 08:14

## 2023-02-09 RX ADMIN — CARVEDILOL 3.12 MG: 3.12 TABLET, FILM COATED ORAL at 08:12

## 2023-02-09 RX ADMIN — FUROSEMIDE 40 MG: 10 INJECTION, SOLUTION INTRAMUSCULAR; INTRAVENOUS at 08:12

## 2023-02-09 RX ADMIN — CARVEDILOL 3.12 MG: 3.12 TABLET, FILM COATED ORAL at 17:34

## 2023-02-09 RX ADMIN — SODIUM CHLORIDE 25 ML: 9 INJECTION, SOLUTION INTRAVENOUS at 15:01

## 2023-02-09 RX ADMIN — Medication 45 MG: at 10:08

## 2023-02-09 RX ADMIN — SODIUM CHLORIDE 25 ML: 9 INJECTION, SOLUTION INTRAVENOUS at 13:04

## 2023-02-09 RX ADMIN — FOLIC ACID 1 MG: 1 TABLET ORAL at 08:11

## 2023-02-09 RX ADMIN — VALSARTAN 40 MG: 80 TABLET, FILM COATED ORAL at 20:21

## 2023-02-09 RX ADMIN — ENOXAPARIN SODIUM 40 MG: 100 INJECTION SUBCUTANEOUS at 20:30

## 2023-02-09 RX ADMIN — ASPIRIN 81 MG 81 MG: 81 TABLET ORAL at 08:10

## 2023-02-09 RX ADMIN — POTASSIUM CHLORIDE 20 MEQ: 20 TABLET, EXTENDED RELEASE ORAL at 08:11

## 2023-02-09 RX ADMIN — SODIUM CHLORIDE, PRESERVATIVE FREE 10 ML: 5 INJECTION INTRAVENOUS at 20:21

## 2023-02-09 RX ADMIN — VALSARTAN 40 MG: 80 TABLET, FILM COATED ORAL at 08:10

## 2023-02-09 RX ADMIN — PIPERACILLIN AND TAZOBACTAM 3375 MG: 3; .375 INJECTION, POWDER, LYOPHILIZED, FOR SOLUTION INTRAVENOUS at 19:08

## 2023-02-09 RX ADMIN — ALTEPLASE 10 MG: 2.2 INJECTION, POWDER, LYOPHILIZED, FOR SOLUTION INTRAVENOUS at 16:22

## 2023-02-09 RX ADMIN — POTASSIUM CHLORIDE 20 MEQ: 20 TABLET, EXTENDED RELEASE ORAL at 17:33

## 2023-02-09 RX ADMIN — PIPERACILLIN AND TAZOBACTAM 4500 MG: 4; .5 INJECTION, POWDER, LYOPHILIZED, FOR SOLUTION INTRAVENOUS at 15:09

## 2023-02-09 RX ADMIN — VANCOMYCIN HYDROCHLORIDE 1250 MG: 10 INJECTION, POWDER, LYOPHILIZED, FOR SOLUTION INTRAVENOUS at 13:06

## 2023-02-09 RX ADMIN — SODIUM CHLORIDE, PRESERVATIVE FREE 10 ML: 5 INJECTION INTRAVENOUS at 13:01

## 2023-02-09 RX ADMIN — APIXABAN 5 MG: 5 TABLET, FILM COATED ORAL at 10:08

## 2023-02-09 RX ADMIN — DORNASE ALFA 5 MG: 1 SOLUTION RESPIRATORY (INHALATION) at 16:22

## 2023-02-09 ASSESSMENT — PAIN SCALES - GENERAL
PAINLEVEL_OUTOF10: 0
PAINLEVEL_OUTOF10: 4

## 2023-02-09 ASSESSMENT — ENCOUNTER SYMPTOMS
SHORTNESS OF BREATH: 1
COUGH: 1

## 2023-02-09 NOTE — PROGRESS NOTES
Progress Note    Admit Date: 2/7/2023  Day: 1  Diet: ADULT DIET; Regular; Low Sodium (2 gm)    CC: SOB     Interval history: This morning patient continues to affirm SOB with improvement on O2 compared to at home, stable to yesterday per pt. Patient does not have baseline O2 requirement, now on 7L. Quickly reports SOB on titration this morning. Patient cont to deny CP, palps, increase swelling, n/v, HA, f/c. Affirm: non-productive cough    Chest tube placed yesterday with sanguinous output. HPI:     Patient is a 55 yo M with PMHx of chronic PE on Eliquis, IVDU, Hep C, and COPD (GOLD 2) presenting to ED with about a week of progressively worsening SOB w/ non-productive cough. On day of presentation patient was unable to perform ADLs and decided he needed to come in. Pt denies chest pain, nausea, vomiting or changes in bowel habits. States had has trace leg swelling since PE last year. Has not noticed any acute changes in this. States that he has difficultly breathing when lying on his back and at night. Patient with hx of chronic saddle PE (3/2022). Echo at the time with 60-65% and was unable to determine if there was any pulmonary hypertension. Pt was an IVDU and is not using anymore and is taking methadone and is positive for hep C that has been treated.        Medications:     Scheduled Meds:   sodium chloride flush  5-40 mL IntraVENous 2 times per day    valsartan  40 mg Oral 2 times per day    furosemide  40 mg IntraVENous BID    carvedilol  3.125 mg Oral BID WC    potassium chloride  20 mEq Oral BID WC    apixaban  5 mg Oral BID    aspirin  81 mg Oral Daily    methadone  45 mg Oral Daily    folic acid  1 mg Oral Daily     Continuous Infusions:   sodium chloride       PRN Meds:sodium chloride flush, sodium chloride, ondansetron **OR** ondansetron, polyethylene glycol, acetaminophen **OR** acetaminophen, potassium chloride **OR** potassium alternative oral replacement **OR** potassium chloride, magnesium sulfate, perflutren lipid microspheres, aluminum & magnesium hydroxide-simethicone    Objective:   Vitals:   T-max:  Patient Vitals for the past 8 hrs:   BP Temp Temp src Pulse Resp SpO2 Weight   02/09/23 0805 100/68 97.8 °F (36.6 °C) Oral 62 20 95 % --   02/09/23 0602 -- -- -- 67 -- -- --   02/09/23 0449 -- -- -- 68 20 -- --   02/09/23 0407 -- -- -- -- -- 94 % --   02/09/23 0301 -- -- -- -- -- -- 219 lb 5.7 oz (99.5 kg)   02/09/23 0300 117/77 98.1 °F (36.7 °C) Oral 57 21 95 % --         Intake/Output Summary (Last 24 hours) at 2/9/2023 1002  Last data filed at 2/9/2023 8762  Gross per 24 hour   Intake 880 ml   Output 1130 ml   Net -250 ml         Review of Systems   Constitutional:  Positive for activity change and fatigue. Negative for fever. Respiratory:  Positive for cough and shortness of breath. Cardiovascular:  Negative for chest pain. Legs swelling is the same as it usually is    Neurological:  Negative for headaches. Physical Exam  Constitutional:       Appearance: Normal appearance. He is obese. HENT:      Nose: No congestion. Mouth/Throat:      Mouth: Mucous membranes are moist.   Eyes:      Pupils: Pupils are equal, round, and reactive to light. Cardiovascular:      Rate and Rhythm: Normal rate and regular rhythm. Heart sounds: Normal heart sounds. Pulmonary:      Comments: Increased WOB, patient with bilateral sided (lateal and lower lobe) crackles. Occasionally diffuse wheeze on auscultation. None productive cough   Chest:      Chest wall: No tenderness. Abdominal:      General: Bowel sounds are normal.      Palpations: Abdomen is soft. Tenderness: There is no abdominal tenderness. Comments: No able to appreciate JVD on examination     Musculoskeletal:      Comments: Patient with trace bilateral edema in calves and ankles. Skin:     General: Skin is warm and dry. Neurological:      Mental Status: He is alert.        LABS:    CBC: Recent Labs     02/08/23  0024 02/09/23  0506   WBC 8.4 9.0   HGB 10.9* 10.7*   HCT 33.2* 33.9*  33.7*    174   MCV 99.2 101.2*       Renal:    Recent Labs     02/08/23 0024 02/08/23 0519 02/09/23  0506   * 133* 133*   K 3.3* 3.6 3.9   CL 91* 90* 90*   CO2 32 31 29   BUN 11 11 16   CREATININE 0.9 1.0 1.1   GLUCOSE 119* 100* 87   CALCIUM 8.5 8.6 8.6   MG  --  2.10 2.10   ANIONGAP 11 12 14       Hepatic:   Recent Labs     02/08/23 0519   PROT 6.8   LABALBU 2.9*     Troponin:   Recent Labs     02/08/23 0024 02/08/23 0519   TROPONINI <0.01 <0.01       BNP: No results for input(s): BNP in the last 72 hours. Lipids: No results for input(s): CHOL, HDL in the last 72 hours. Invalid input(s): LDLCALCU, TRIGLYCERIDE  ABGs:  No results for input(s): PHART, AUP2LDY, PO2ART, ECD6VMR, BEART, THGBART, D1IEDUJN, BTE4ZFD in the last 72 hours. INR:   Recent Labs     02/08/23 0519   INR 2.57*     Lactate: No results for input(s): LACTATE in the last 72 hours. Cultures:  -----------------------------------------------------------------  RAD:   XR CHEST PORTABLE   Final Result      1. Slightly decreased loculated right pleural effusion status post right chest tube placement. There is a small amount of associated right pleural gas. 2.  Persistent diffuse airspace disease in the left lung and right mid and lower lung airspace disease. CT CHEST PULMONARY EMBOLISM W CONTRAST   Final Result      Linear, eccentric, and left leg filling defects in the right lower lobe segmental and subsegmental pulmonary arteries are suggestive of chronic pulmonary emboli. No definite acute pulmonary emboli identified. Extensive groundglass opacification of both lungs is suspicious for atypical pneumonia although a component of asymmetric pulmonary edema could have a similar appearance. Large complex loculated right pleural fluid collection.  Recommend diagnostic thoracentesis, as hemothorax or malignant effusion cannot be excluded. Trace left pleural effusion. INCIDENTAL FINDINGS: None. XR CHEST PORTABLE   Final Result      Extensive bilateral airspace disease, which may represent pneumonia or pulmonary edema. Cardiomegaly. Moderate to large right and questionable small left pleural effusions. Assessment/Plan:     John Portillo 55 yo M w/ pmhx of chronic PE on Eliquis, IVDU, Hep C, COPD, HTN who presents to the ED with complaints of SOB for the past 6 or 7 days. Pt has had a nonproductive cough as well. Pt denies chest pain, nausea, vomiting, changes in bowel habits. #Acute Hypoxic Respiratory Failure  #Suspect New onset Heart Failure  #Plural Effusion, exudative   #COPD   Pt has acute onset shortness of breath that began 6-7 days ago. CT PE shows significant pleural effusions on the right side as well a ground glass opacities. BNP 4462.  6/2022 - patient weight is ~240. Patient this admission is ~225 (down 15 lbs)   Patient unimpressive infectious workup (nl WBC, barely elevated pro alba .19, afebrile). Trop < .01  New O2 requirement (15 HFNC on admission), now on 7L  HF  - Cards consulted  - IV Lasix 40mg BID, may reevalaute given EF   - f/u ECHO: 55%-60% EF, unable to eval diastolic fxn, 41 mmhg PA pressure. - Monitor strict I/O's  - Daily Weights  - Monitor K+, Mg+  COPD  - Duonebs   - wean O2 as tolerated     #Plural Exudative Effusion   Tap on 2/8 with LD and Protein concerning for exudative effusion   Ddx: Chronic PE vs ?PNA (less likely) vs Hemothorax   - Pulm consulted appreciate recs   - Chest tube in, 300 ml out sanguinous appearing, small air leak     #Chronic PE   CT PE does not show a new PE  Previous admission with + hypercoag workup for showing +homocysteine.  .   - ASA + eliquis s/sp procedure   - Hypercoag labs: B12 nl , folate low, and MMA pending    #Anemia    on 2/9  nl B12, folate <2, MMA pending   - folate replete    #Hx of IVDU  Pt states that he has not used in a long time and is decreasing the dose of methadone  - 45mg Methadone     #HTN  - home coreg  - home valsartan     Code Status:Full code  FEN: Regular  PPX: Eliquis held for procedure  DISPO: IP      As transcribed by Sushma Jane MS4  02/09/23  10:02 AM    As scribed for Janki Kauffman MD PGY-1    This patient has been staffed and discussed with Lucius Allen MD.

## 2023-02-09 NOTE — CONSULTS
Clinical Pharmacy Progress Note    Vancomycin - Management by Pharmacy    Consult Date(s): 2/9/23  Consulting Provider(s): Dr. Payal Swanson / Plan  PNA, Loculated pleural effusion - Vancomycin  Concurrent Antimicrobials:   Zosyn - Day #1  Day of Vanc Therapy / Ordered Duration: #1 of 7  Current Dosing Method: Bayesian-Guided AUC Dosing  Therapeutic Goal: -600 mg/L*hr  Current Dose / Plan: Will begin vancomycin 1250mg IV q12h. Regimen predicts AUC of 544 mg/L*h with steady-state trough of ~17.5 mg/L.  SCr 1.1 today - up slightly from 0.9 on admission (2/8). Will monitor closely. Vancomycin level ordered for tomorrow AM - Fri 2/10 to confirm kinetic estimates  Will continue to monitor clinical condition and make adjustments to regimen as appropriate. Please call with questions--  Ashlyn EllisD, BCPS  Wireless: [id number]   2/9/2023 11:58 AM        Subjective/Objective:   John Sims is a 56 y.o. male with a PMHx significant for chronic PE (on Apxiaban), IVDU, hepatitis C, COPD, HTN who presented to ED with SOB x 6-7 days. Admitted 2/7 with new onset heart failure. Pulmonology consulted this admission for large R pleural fluid collection - s/p chest tube placement on 2/8. Started on broad spectrum ABx on 2/9. Pharmacy is consulted to dose Vancomycin. Ht Readings from Last 1 Encounters:   02/08/23 5' 10\" (1.778 m)     Wt Readings from Last 1 Encounters:   02/09/23 219 lb 5.7 oz (99.5 kg)     Current & Prior Antimicrobial Regimen(s):  Zosyn (2/9-current)  Vancomycin - Pharmacy to dose  1250mg IV q12h (2/9-current)    Vancomycin Level(s) / Doses:    Date Time Dose Type of Level / Level Interpretation                 Note: Serum levels collected for AUC-based dosing may be high if collected in close proximity to the dose administered. This is not necessarily indicative of toxicity.     Cultures & Sensitivities:    Date Site Micro Susceptibility / Result   2/8 COVID-19 + influenza, Rapid Not detected     Pleural fluid      MRSA nasal PCR      Strep pneumo antigen      Legionella antigen       Recent Labs     02/08/23  0024 02/08/23  0519 02/09/23  0506   CREATININE 0.9 1.0 1.1   BUN 11 11 16   WBC 8.4  --  9.0       Estimated Creatinine Clearance: 99 mL/min (based on SCr of 1.1 mg/dL).     Additional Lab Values / Findings of Note:    Recent Labs     02/08/23  0024   PROCAL 0.19*

## 2023-02-09 NOTE — PROGRESS NOTES
Physician Progress Note      PATIENTGreejulio césar Vizcaino  CSN #:                  [id number]  :                       1975  ADMIT DATE:       2023 11:28 PM  [address] [place] DATE:  RESPONDING  PROVIDER #:        Jack Wylie MD          QUERY TEXT:    Patient admitted with new onset Heart failure. Noted documentation of   \"suspicious for atypical pneumonia\" in progress note from CT results 23. In order to support the diagnosis of Pneumonia, please include additional   clinical indicators in your documentation. Or please document if the   diagnosis of Pneumonia has been ruled out after further study. The medical record reflects the following:  Risk Factors: \"Exudative pleural effusion\", COPD  Clinical Indicators: CT \"Extensive ground glass opacification of both lungs is   suspicious for atypical pneumonia\",  Treatment: CBC, CMP, CXR, CT, Pulmonary consult, Meds-IV Zosyn, IV Vancomycin,   V/S and I/O monitoring per  unit protocol, Oxygen therapy  Options provided:  -- Pneumonia present as evidenced by, Please document evidence. -- Pneumonia was ruled out  -- Other - I will add my own diagnosis  -- Disagree - Not applicable / Not valid  -- Disagree - Clinically unable to determine / Unknown  -- Refer to Clinical Documentation Reviewer    PROVIDER RESPONSE TEXT:    Pneumonia is present as evidenced by cough, SOB, imaging evidence on CT.     Query created by: Jovanni Araiza on 2023 1:17 PM      Electronically signed by:  Jack Wylie MD 2023 2:06 PM

## 2023-02-09 NOTE — PROGRESS NOTES
[address] Daily Progress Note      Admit Date:  2/7/2023    Subjective:  Mr. Angie Lomeli was seen and examined. F/U CHF. Breathing some better. No chest pain.      Objective:   BP (!) 97/55   Pulse 60   Temp 97.8 °F (36.6 °C) (Oral)   Resp 20   Ht 5' 10\" (1.778 m)   Wt 219 lb 5.7 oz (99.5 kg)   SpO2 94%   BMI 31.47 kg/m²     Intake/Output Summary (Last 24 hours) at 2/9/2023 1500  Last data filed at 2/9/2023 0771  Gross per 24 hour   Intake 400 ml   Output 680 ml   Net -280 ml       TELEMETRY: Sinus     Physical Exam:  General:  Awake, alert, NAD  Skin:  Warm and dry  Neck:  JVP difficult  Chest:  decreased BS, respiration normal  Cardiovascular:  RRR S1S2  Abdomen:  Soft nontender  Extremities:  no edema    Medications:    piperacillin-tazobactam  4,500 mg IntraVENous Once    Followed by    piperacillin-tazobactam  3,375 mg IntraVENous Q8H    vancomycin  1,250 mg IntraVENous Q12H    alteplase (ACTIVASE) syringe  10 mg IntraPLEUral BID    And    dornase (PULMOZYME) syringe  5 mg IntraPLEUral BID    enoxaparin  40 mg SubCUTAneous Daily    sodium chloride flush  5-40 mL IntraVENous 2 times per day    valsartan  40 mg Oral 2 times per day    carvedilol  3.125 mg Oral BID WC    potassium chloride  20 mEq Oral BID WC    aspirin  81 mg Oral Daily    methadone  45 mg Oral Daily    folic acid  1 mg Oral Daily      sodium chloride 25 mL (02/09/23 1304)     sodium chloride flush, sodium chloride, ondansetron **OR** ondansetron, polyethylene glycol, acetaminophen **OR** acetaminophen, potassium chloride **OR** potassium alternative oral replacement **OR** potassium chloride, magnesium sulfate, perflutren lipid microspheres, aluminum & magnesium hydroxide-simethicone    Lab Data:  CBC:   Recent Labs     02/08/23  0024 02/09/23  0506   WBC 8.4 9.0   HGB 10.9* 10.7*   HCT 33.2* 33.9*  33.7*   MCV 99.2 101.2*    174     BMP:   Recent Labs     02/08/23  0024 02/08/23  0519 02/09/23  0506   * 133* 133* K 3.3* 3.6 3.9   CL 91* 90* 90*   CO2 32 31 29   BUN 11 11 16   CREATININE 0.9 1.0 1.1     LIVER PROFILE: No results for input(s): AST, ALT, LIPASE, BILIDIR, BILITOT, ALKPHOS in the last 72 hours. Invalid input(s): AMYLASE,  ALB  PT/INR:   Recent Labs     02/08/23  0519   PROTIME 27.7*   INR 2.57*     APTT: No results for input(s): APTT in the last 72 hours. BNP:  No results for input(s): BNP in the last 72 hours. IMAGING:     Assessment:  Patient Active Problem List    Diagnosis Date Noted    Acute heart failure, unspecified heart failure type ([facility] [address] 02/08/2023    Normocytic anemia 02/08/2023    Chronic anticoagulation 02/08/2023    HTN (hypertension) 02/08/2023    Opioid dependence on agonist therapy ([facility] [address] 02/08/2023    Hx pulmonary embolism 02/08/2023    Acute pulmonary edema ([facility] [address] 02/08/2023    Hemothorax on right 02/08/2023    Pleural effusion 02/08/2023       Plan:  Breathing some better. Still on high flow O2. Continue diuresis. Wean O2. Echo shows normal LV function. On Coreg/diovan. Cr stable. Had pleural effusion tapped. CT in place. Cytology pending. Pulmonary following.        Core Measures:  Discharge instructions:   LVEF documented:   ACEI for LV dysfunction:   Smoking Cessation:    Josefina Braden MD, MD 2/9/2023 3:00 PM

## 2023-02-09 NOTE — PROGRESS NOTES
Pulmonology Progress Note  PGY-3    Admit Date: 2/7/2023  Hospital Day: 3  Diet: ADULT DIET; Regular; Low Sodium (2 gm)  Code Status: Full Code       Interval history:  Patient was seen and examined this morning. Chest tube was placed yesterday. Patient had 280 cc bloody output from chest tube. Patient remained afebrile. He is on 6L of O2 this morning. Patient reports that his SOB is getting better with oxygen. Denies fevers, chills, chest pain, nausea, vomiting, diarrhea, or constipation. Medications:   Scheduled Meds:   sodium chloride flush  5-40 mL IntraVENous 2 times per day    valsartan  40 mg Oral 2 times per day    furosemide  40 mg IntraVENous BID    carvedilol  3.125 mg Oral BID WC    potassium chloride  20 mEq Oral BID WC    apixaban  5 mg Oral BID    aspirin  81 mg Oral Daily    methadone  45 mg Oral Daily    folic acid  1 mg Oral Daily       Continuous Infusions:   sodium chloride         PRN Meds:  sodium chloride flush, sodium chloride, ondansetron **OR** ondansetron, polyethylene glycol, acetaminophen **OR** acetaminophen, potassium chloride **OR** potassium alternative oral replacement **OR** potassium chloride, magnesium sulfate, perflutren lipid microspheres, aluminum & magnesium hydroxide-simethicone    Vital/I&O/Physical examination:   VS:  /68   Pulse 62   Temp 97.8 °F (36.6 °C) (Oral)   Resp 20   Ht 5' 10\" (1.778 m)   Wt 219 lb 5.7 oz (99.5 kg)   SpO2 95%   BMI 31.47 kg/m²     I/O:    Intake/Output Summary (Last 24 hours) at 2/9/2023 0900  Last data filed at 2/9/2023 9837  Gross per 24 hour   Intake 880 ml   Output 1130 ml   Net -250 ml       PE:  Physical Exam  Vitals reviewed. HENT:      Mouth/Throat:      Mouth: Mucous membranes are moist.   Eyes:      Extraocular Movements: Extraocular movements intact. Conjunctiva/sclera: Conjunctivae normal.      Pupils: Pupils are equal, round, and reactive to light.    Cardiovascular:      Rate and Rhythm: Normal rate and regular rhythm. Pulses: Normal pulses. Heart sounds: Normal heart sounds. Pulmonary:      Effort: Pulmonary effort is normal.      Breath sounds: Normal breath sounds. Comments: On 6L of oxygen  Abdominal:      Palpations: Abdomen is soft. Musculoskeletal:         General: Normal range of motion. Cervical back: Normal range of motion and neck supple. Neurological:      General: No focal deficit present. Mental Status: He is alert and oriented to person, place, and time. Labs & Imaging:   LABS:  Renal:   Recent Labs     02/08/23  0024 02/08/23  0519 02/09/23  0506   * 133* 133*   K 3.3* 3.6 3.9   CL 91* 90* 90*   CO2 32 31 29   BUN 11 11 16   CREATININE 0.9 1.0 1.1   GLUCOSE 119* 100* 87   ANIONGAP 11 12 14     CBC:   Recent Labs     02/08/23  0024 02/09/23  0506   WBC 8.4 9.0   HGB 10.9* 10.7*   HCT 33.2* 33.7*    174   MCV 99.2 101.2*                            Hepatic: No results for input(s): AST, ALT, ALB, BILITOT, ALKPHOS in the last 72 hours. Troponin:   Recent Labs     02/08/23 0024 02/08/23 0519   TROPONINI <0.01 <0.01     BNP: No results for input(s): BNP in the last 72 hours. Lipids: No results for input(s): CHOL, HDL in the last 72 hours. Invalid input(s): LDLCALCU, TRIGLYCERIDE  INR:   Recent Labs     02/08/23 0519   INR 2.57*     Lactate: No results for input(s): LACTATE in the last 72 hours. ABGs:No results for input(s): PHART, YVN9BUG, PO2ART, MFJ3HRE, BEART, THGBART, G2APMIYV, HJV8AQE in the last 72 hours. UA:  Recent Labs     02/08/23  2213   PHUR 6.0        IMAGING:  XR CHEST PORTABLE   Final Result      1. Slightly decreased loculated right pleural effusion status post right chest tube placement. There is a small amount of associated right pleural gas. 2.  Persistent diffuse airspace disease in the left lung and right mid and lower lung airspace disease.          CT CHEST PULMONARY EMBOLISM W CONTRAST   Final Result      Linear, eccentric, and left leg filling defects in the right lower lobe segmental and subsegmental pulmonary arteries are suggestive of chronic pulmonary emboli. No definite acute pulmonary emboli identified. Extensive groundglass opacification of both lungs is suspicious for atypical pneumonia although a component of asymmetric pulmonary edema could have a similar appearance. Large complex loculated right pleural fluid collection. Recommend diagnostic thoracentesis, as hemothorax or malignant effusion cannot be excluded. Trace left pleural effusion. INCIDENTAL FINDINGS: None. XR CHEST PORTABLE   Final Result      Extensive bilateral airspace disease, which may represent pneumonia or pulmonary edema. Cardiomegaly. Moderate to large right and questionable small left pleural effusions. Assessment & Plan:    Ulises Lewis is a 56 y.o. male with PMH  significant for PE on Eliquis, IVDU, hep C, COPD, hypertension presented to the ED with chief complaint of shortness of breath. Pulmonology was consulted for large complex loculated right pleural fluid collection. Large complex loculated right pleural fluid:  -Patient presented with shortness of breath for the past 8 days.  -He has been having cough and whitish sputum production. Patient has history of PE.  -CT chest showed linear, eccentric, and left lobe filling defects in the right lower lobe segmental and subsegmental pulmonary arteries are suggestive of chronic pulmonary emboli. Extensive groundglass opacification of both lungs is suspicious for atypical pneumonia although a component of asymmetric pulmonary edema could have a similar appearance. Large complex loculated right pleural fluid collection.    - S/p chest tube placement. He has had 280 cc output from chest tube. - Pleural fluid analysis showed , total protein of 4.3, glucose 20 which is consistent with exudative pleural effusion/empyema or hemothorax.   - Strep pneumo, legionella urine antigen, MRSA nasal prob, CRP, SAVANNAH, HIV  - Start [place] and zosyn.   - NPO after mid night for bronchoscopy tomorrow. Code Status: Full Code  ADULT DIET; Regular; Low Sodium (2 gm)      This patient will be discussed with attending, Dr. Yulissa Dorman MD.    Luci Fitch MD MD, PGY- 3  Contact via [employer]  2/9/2023,  9:00 AM    Patient seen, examined and discussed with the resident and I agree with the assessment and plan. Patient remains toxic appearing and his workup thus far has been a bit contradictory. His pleural fluid looks like a hemothorax which will give exudative numbers, but shouldn't give a low glucose. That's more consistent with an infection or rheumatoid effusion. Given his overall ill appearance, will start antibiotics despite the normal WBC and normal procalcitonin. Pleural fluid output was a bit meager and there's clearly more on chest xray. Starting tpa/dornase instillations today and will put chest tube to suction instead of gravity. His imaging is unusual as 95% of the ground glass opacities are on the left side. Stop Eliquis. He's too far out from his PE for it to be any benefit any longer, and I'm going to perform a bronchoscopy tomorrow, with biopsies. In addition, I don't want to encourage more bleeding on the right side as I'm adding thrombolytics to his pleural fluid. Checking systemic inflammatory markers and autoimmune screening as well as HIV screening. Stop diuretics. Patient isn't overloaded, and echo was normal.    NPO after midnight for bronchoscopy with BAL and transbronchial biopsies tomorrow.         Milena Livingston MD

## 2023-02-09 NOTE — PROGRESS NOTES
Pharmacy Note - Extended Infusion Beta-Lactam Adjustment    Piperacillin/Tazobactam ordered for treatment of PNA. Per [address] Extended Infusion Beta-Lactam Policy, Zosyn will be changed to 4500mg IV x1 over 30 min, followed by 3375 mg IV EI q8h. Estimated Creatinine Clearance: Estimated Creatinine Clearance: 99 mL/min (based on SCr of 1.1 mg/dL). Dialysis Status, KARINA, CKD: n/a  BMI: Body mass index is 31.47 kg/m². Rationale for Adjustment: Agent is renally eliminated and demonstrates time-dependent effect on bacterial eradication. Extended-infusion dosing strategy aims to enhance microbiologic and clinical efficacy. Pharmacy will continue to monitor renal function and adjust dose as necessary.       Please call with questions--  Cory Matute PharmD, BCPS  Wireless: [phone]   2/9/2023 11:39 AM

## 2023-02-09 NOTE — PLAN OF CARE
Problem: Respiratory - Adult  Goal: Achieves optimal ventilation and oxygenation  2/9/2023 1829 by Tab Santoyo RN  Outcome: Progressing  Flowsheets (Taken 2/9/2023 0147 by Maine Dorantes RN)  Achieves optimal ventilation and oxygenation:   Assess for changes in respiratory status   Assess for changes in mentation and behavior   Position to facilitate oxygenation and minimize respiratory effort   Assess and instruct to report shortness of breath or any respiratory difficulty  Note: Assess for respiratory distress and alterations in mentation and behavior. Position with HOB elevated to optimize lung capacity. SpO2 within therapeutic levels. Instructed to report SOB. O2 at 5L nasal canula. Problem: Cardiovascular - Adult  Goal: Maintains optimal cardiac output and hemodynamic stability  2/9/2023 1829 by Tab Santoyo RN  Outcome: Progressing  Flowsheets (Taken 2/9/2023 0147 by Maine Dorantes RN)  Maintains optimal cardiac output and hemodynamic stability:   Monitor blood pressure and heart rate   Monitor urine output and notify Licensed Independent Practitioner for values outside of normal range   Assess for signs of decreased cardiac output  Note: Vitals stable. Occasional sinus florin. Monitor for decreased cardiac output. Problem: Metabolic/Fluid and Electrolytes - Adult  Goal: Electrolytes maintained within normal limits  2/9/2023 1829 by Tab Santoyo RN  Outcome: Progressing  Flowsheets (Taken 2/9/2023 1829)  Electrolytes maintained within normal limits:   Monitor labs and assess patient for signs and symptoms of electrolyte imbalances   Instruct patient on fluid and nutrition restrictions as appropriate   Monitor response to electrolyte replacements, including repeat lab results as appropriate   Administer electrolyte replacement as ordered  Note: Potassium supplements given PO. Labs monitored. Education on s/s of hypo/hyperkalemia.

## 2023-02-09 NOTE — PROCEDURES
At 16:40, 30ml of tpa and 30mL of dornase were administered by me, to patient's chest tube, to dwell in the pleural space for 1 hour. Nursing will reopen the chest tube at approximately 17:20 this evening and will place chest tube to suction.

## 2023-02-09 NOTE — PLAN OF CARE
Patient with right chest tube to water seal atrium by gravity, patent with oscillation, draining dark red blood. Chest tube site clean and intact, no crepitus. On 5-6 lpm High flow nasal cannula. Sinus rhythm to sinus bradycardia with the lowest heart rate of 38bpm captured in telemetry. Problem: Respiratory - Adult  Goal: Achieves optimal ventilation and oxygenation  Outcome: Progressing  Flowsheets (Taken 2/9/2023 0147)  Achieves optimal ventilation and oxygenation:   Assess for changes in respiratory status   Assess for changes in mentation and behavior   Position to facilitate oxygenation and minimize respiratory effort   Assess and instruct to report shortness of breath or any respiratory difficulty  Note: On supplemental oxygen to 5lpm high flow nasal cannula. Problem: Cardiovascular - Adult  Goal: Maintains optimal cardiac output and hemodynamic stability  Outcome: Progressing  Flowsheets (Taken 2/9/2023 0147)  Maintains optimal cardiac output and hemodynamic stability:   Monitor blood pressure and heart rate   Monitor urine output and notify Licensed Independent Practitioner for values outside of normal range   Assess for signs of decreased cardiac output  Note: Sinus rhythm to sinus bradycardia. Problem: Safety - Adult  Goal: Free from fall injury  Outcome: Progressing  Flowsheets (Taken 2/9/2023 0147)  Free From Fall Injury:   Based on caregiver fall risk screen, instruct family/caregiver to ask for assistance with transferring infant if caregiver noted to have fall risk factors   Instruct family/caregiver on patient safety  Note: Fall prevention protocol in place. Call light kept within reach. Calls/ needs attended. Bed wheels locked and kept in lowest level. Bed alarm ON. Room clutters removed.      Problem: Discharge Planning  Goal: Discharge to home or other facility with appropriate resources  Outcome: Progressing  Flowsheets (Taken 2/9/2023 0147)  Discharge to home or other facility with appropriate resources: Identify barriers to discharge with patient and caregiver  Note: On right chest tube to water seal atrium

## 2023-02-10 ENCOUNTER — ANESTHESIA (OUTPATIENT)
Dept: ENDOSCOPY | Age: 48
End: 2023-02-10
Payer: MEDICAID

## 2023-02-10 ENCOUNTER — APPOINTMENT (OUTPATIENT)
Dept: GENERAL RADIOLOGY | Age: 48
DRG: 166 | End: 2023-02-10
Payer: MEDICAID

## 2023-02-10 ENCOUNTER — ANESTHESIA EVENT (OUTPATIENT)
Dept: ENDOSCOPY | Age: 48
End: 2023-02-10
Payer: MEDICAID

## 2023-02-10 LAB
ANION GAP SERPL CALCULATED.3IONS-SCNC: 9 MMOL/L (ref 3–16)
ANTI-NUCLEAR ANTIBODY (ANA): NEGATIVE
APPEARANCE BAL (LAVAGE): ABNORMAL
BUN BLDV-MCNC: 19 MG/DL (ref 7–20)
CALCIUM SERPL-MCNC: 8.1 MG/DL (ref 8.3–10.6)
CHLORIDE BLD-SCNC: 92 MMOL/L (ref 99–110)
CHOLESTEROL, TOTAL: 139 MG/DL (ref 0–199)
CLOT EVALUATION BAL: ABNORMAL
CO2: 30 MMOL/L (ref 21–32)
COLOR LAVAGE: ABNORMAL
CREAT SERPL-MCNC: 1 MG/DL (ref 0.9–1.3)
EKG ATRIAL RATE: 59 BPM
EKG DIAGNOSIS: NORMAL
EKG P AXIS: 7 DEGREES
EKG P-R INTERVAL: 134 MS
EKG Q-T INTERVAL: 600 MS
EKG QRS DURATION: 86 MS
EKG QTC CALCULATION (BAZETT): 594 MS
EKG R AXIS: -26 DEGREES
EKG T AXIS: 2 DEGREES
EKG VENTRICULAR RATE: 59 BPM
EOSIN: 2 %
GFR SERPL CREATININE-BSD FRML MDRD: >60 ML/MIN/{1.73_M2}
GLUCOSE BLD-MCNC: 81 MG/DL (ref 70–99)
HDLC SERPL-MCNC: 14 MG/DL (ref 40–60)
HIV AG/AB: NORMAL
HIV ANTIGEN: NORMAL
HIV-1 ANTIBODY: NORMAL
HIV-2 AB: NORMAL
LDL CHOLESTEROL CALCULATED: 89 MG/DL
LYMPHOCYTES, BAL: 22 % (ref 5–10)
MACROPHAGES, BAL: 22 % (ref 90–95)
MAGNESIUM: 2 MG/DL (ref 1.8–2.4)
MONOCYTES, BAL: 4 %
NUMBER OF CELLS COUNTED BAL (LAVAGE): 50
POTASSIUM SERPL-SCNC: 3.8 MMOL/L (ref 3.5–5.1)
RBC, BAL: 335 /CUMM
SEGMENTED NEUTROPHILS, BAL: 50 % (ref 5–10)
SODIUM BLD-SCNC: 131 MMOL/L (ref 136–145)
TRIGL SERPL-MCNC: 180 MG/DL (ref 0–150)
VANCOMYCIN RANDOM: 20.9 UG/ML
VLDLC SERPL CALC-MCNC: 36 MG/DL
WBC/EPI CELLS BAL: 65 /CUMM

## 2023-02-10 PROCEDURE — 2060000000 HC ICU INTERMEDIATE R&B

## 2023-02-10 PROCEDURE — 31624 DX BRONCHOSCOPE/LAVAGE: CPT | Performed by: INTERNAL MEDICINE

## 2023-02-10 PROCEDURE — 31628 BRONCHOSCOPY/LUNG BX EACH: CPT | Performed by: INTERNAL MEDICINE

## 2023-02-10 PROCEDURE — 80048 BASIC METABOLIC PNL TOTAL CA: CPT

## 2023-02-10 PROCEDURE — 87633 RESP VIRUS 12-25 TARGETS: CPT

## 2023-02-10 PROCEDURE — 2709999900 HC NON-CHARGEABLE SUPPLY: Performed by: INTERNAL MEDICINE

## 2023-02-10 PROCEDURE — 3700000000 HC ANESTHESIA ATTENDED CARE: Performed by: INTERNAL MEDICINE

## 2023-02-10 PROCEDURE — 2580000003 HC RX 258: Performed by: INTERNAL MEDICINE

## 2023-02-10 PROCEDURE — 6370000000 HC RX 637 (ALT 250 FOR IP): Performed by: INTERNAL MEDICINE

## 2023-02-10 PROCEDURE — 71045 X-RAY EXAM CHEST 1 VIEW: CPT

## 2023-02-10 PROCEDURE — 80202 ASSAY OF VANCOMYCIN: CPT

## 2023-02-10 PROCEDURE — 93010 ELECTROCARDIOGRAM REPORT: CPT | Performed by: INTERNAL MEDICINE

## 2023-02-10 PROCEDURE — 88305 TISSUE EXAM BY PATHOLOGIST: CPT

## 2023-02-10 PROCEDURE — 99233 SBSQ HOSP IP/OBS HIGH 50: CPT | Performed by: INTERNAL MEDICINE

## 2023-02-10 PROCEDURE — 3609010800 HC BRONCHOSCOPY ALVEOLAR LAVAGE: Performed by: INTERNAL MEDICINE

## 2023-02-10 PROCEDURE — 87252 VIRUS INOCULATION TISSUE: CPT

## 2023-02-10 PROCEDURE — 7100000000 HC PACU RECOVERY - FIRST 15 MIN: Performed by: INTERNAL MEDICINE

## 2023-02-10 PROCEDURE — 87206 SMEAR FLUORESCENT/ACID STAI: CPT

## 2023-02-10 PROCEDURE — 0BBG8ZX EXCISION OF LEFT UPPER LUNG LOBE, VIA NATURAL OR ARTIFICIAL OPENING ENDOSCOPIC, DIAGNOSTIC: ICD-10-PCS | Performed by: INTERNAL MEDICINE

## 2023-02-10 PROCEDURE — 94761 N-INVAS EAR/PLS OXIMETRY MLT: CPT

## 2023-02-10 PROCEDURE — 7100000001 HC PACU RECOVERY - ADDTL 15 MIN: Performed by: INTERNAL MEDICINE

## 2023-02-10 PROCEDURE — 88341 IMHCHEM/IMCYTCHM EA ADD ANTB: CPT

## 2023-02-10 PROCEDURE — 36415 COLL VENOUS BLD VENIPUNCTURE: CPT

## 2023-02-10 PROCEDURE — 2700000000 HC OXYGEN THERAPY PER DAY

## 2023-02-10 PROCEDURE — 3609011800 HC BRONCHOSCOPY/TRANSBRONCHIAL LUNG BIOPSY: Performed by: INTERNAL MEDICINE

## 2023-02-10 PROCEDURE — 87253 VIRUS INOCULATE TISSUE ADDL: CPT

## 2023-02-10 PROCEDURE — 6360000002 HC RX W HCPCS: Performed by: INTERNAL MEDICINE

## 2023-02-10 PROCEDURE — 87116 MYCOBACTERIA CULTURE: CPT

## 2023-02-10 PROCEDURE — 88112 CYTOPATH CELL ENHANCE TECH: CPT

## 2023-02-10 PROCEDURE — 87449 NOS EACH ORGANISM AG IA: CPT

## 2023-02-10 PROCEDURE — 87070 CULTURE OTHR SPECIMN AEROBIC: CPT

## 2023-02-10 PROCEDURE — 94640 AIRWAY INHALATION TREATMENT: CPT

## 2023-02-10 PROCEDURE — 87102 FUNGUS ISOLATION CULTURE: CPT

## 2023-02-10 PROCEDURE — 87081 CULTURE SCREEN ONLY: CPT

## 2023-02-10 PROCEDURE — 83735 ASSAY OF MAGNESIUM: CPT

## 2023-02-10 PROCEDURE — 6370000000 HC RX 637 (ALT 250 FOR IP)

## 2023-02-10 PROCEDURE — 89051 BODY FLUID CELL COUNT: CPT

## 2023-02-10 PROCEDURE — 3209999900 FLUORO FOR SURGICAL PROCEDURES

## 2023-02-10 PROCEDURE — 88342 IMHCHEM/IMCYTCHM 1ST ANTB: CPT

## 2023-02-10 PROCEDURE — 0B9G8ZX DRAINAGE OF LEFT UPPER LUNG LOBE, VIA NATURAL OR ARTIFICIAL OPENING ENDOSCOPIC, DIAGNOSTIC: ICD-10-PCS | Performed by: INTERNAL MEDICINE

## 2023-02-10 PROCEDURE — 2580000003 HC RX 258

## 2023-02-10 PROCEDURE — 2500000003 HC RX 250 WO HCPCS: Performed by: NURSE ANESTHETIST, CERTIFIED REGISTERED

## 2023-02-10 PROCEDURE — 6360000002 HC RX W HCPCS: Performed by: NURSE ANESTHETIST, CERTIFIED REGISTERED

## 2023-02-10 PROCEDURE — 6370000000 HC RX 637 (ALT 250 FOR IP): Performed by: STUDENT IN AN ORGANIZED HEALTH CARE EDUCATION/TRAINING PROGRAM

## 2023-02-10 PROCEDURE — 87205 SMEAR GRAM STAIN: CPT

## 2023-02-10 PROCEDURE — 88312 SPECIAL STAINS GROUP 1: CPT

## 2023-02-10 PROCEDURE — 3700000001 HC ADD 15 MINUTES (ANESTHESIA): Performed by: INTERNAL MEDICINE

## 2023-02-10 PROCEDURE — 93005 ELECTROCARDIOGRAM TRACING: CPT

## 2023-02-10 PROCEDURE — 31632 BRONCHOSCOPY/LUNG BX ADDL: CPT | Performed by: INTERNAL MEDICINE

## 2023-02-10 PROCEDURE — 87305 ASPERGILLUS AG IA: CPT

## 2023-02-10 PROCEDURE — 0BBJ8ZX EXCISION OF LEFT LOWER LUNG LOBE, VIA NATURAL OR ARTIFICIAL OPENING ENDOSCOPIC, DIAGNOSTIC: ICD-10-PCS | Performed by: INTERNAL MEDICINE

## 2023-02-10 PROCEDURE — 87254 VIRUS INOCULATION SHELL VIA: CPT

## 2023-02-10 RX ORDER — SODIUM CHLORIDE, SODIUM LACTATE, POTASSIUM CHLORIDE, AND CALCIUM CHLORIDE .6; .31; .03; .02 G/100ML; G/100ML; G/100ML; G/100ML
500 INJECTION, SOLUTION INTRAVENOUS ONCE
Status: COMPLETED | OUTPATIENT
Start: 2023-02-10 | End: 2023-02-10

## 2023-02-10 RX ORDER — DEXAMETHASONE SODIUM PHOSPHATE 4 MG/ML
INJECTION, SOLUTION INTRA-ARTICULAR; INTRALESIONAL; INTRAMUSCULAR; INTRAVENOUS; SOFT TISSUE PRN
Status: DISCONTINUED | OUTPATIENT
Start: 2023-02-10 | End: 2023-02-10 | Stop reason: SDUPTHER

## 2023-02-10 RX ORDER — SODIUM CHLORIDE 0.9 % (FLUSH) 0.9 %
5-40 SYRINGE (ML) INJECTION PRN
Status: DISCONTINUED | OUTPATIENT
Start: 2023-02-10 | End: 2023-02-10 | Stop reason: HOSPADM

## 2023-02-10 RX ORDER — HYDRALAZINE HYDROCHLORIDE 20 MG/ML
10 INJECTION INTRAMUSCULAR; INTRAVENOUS
Status: DISCONTINUED | OUTPATIENT
Start: 2023-02-10 | End: 2023-02-10 | Stop reason: HOSPADM

## 2023-02-10 RX ORDER — SODIUM CHLORIDE 9 MG/ML
INJECTION, SOLUTION INTRAVENOUS PRN
Status: DISCONTINUED | OUTPATIENT
Start: 2023-02-10 | End: 2023-02-10 | Stop reason: HOSPADM

## 2023-02-10 RX ORDER — PROPOFOL 10 MG/ML
INJECTION, EMULSION INTRAVENOUS PRN
Status: DISCONTINUED | OUTPATIENT
Start: 2023-02-10 | End: 2023-02-10 | Stop reason: SDUPTHER

## 2023-02-10 RX ORDER — OXYCODONE HYDROCHLORIDE 5 MG/1
5 TABLET ORAL
Status: DISCONTINUED | OUTPATIENT
Start: 2023-02-10 | End: 2023-02-10 | Stop reason: HOSPADM

## 2023-02-10 RX ORDER — MEPERIDINE HYDROCHLORIDE 25 MG/ML
12.5 INJECTION INTRAMUSCULAR; INTRAVENOUS; SUBCUTANEOUS EVERY 5 MIN PRN
Status: DISCONTINUED | OUTPATIENT
Start: 2023-02-10 | End: 2023-02-10 | Stop reason: HOSPADM

## 2023-02-10 RX ORDER — SUCCINYLCHOLINE CHLORIDE 20 MG/ML
INJECTION INTRAMUSCULAR; INTRAVENOUS PRN
Status: DISCONTINUED | OUTPATIENT
Start: 2023-02-10 | End: 2023-02-10 | Stop reason: SDUPTHER

## 2023-02-10 RX ORDER — PROCHLORPERAZINE EDISYLATE 5 MG/ML
10 INJECTION INTRAMUSCULAR; INTRAVENOUS EVERY 6 HOURS PRN
Status: DISCONTINUED | OUTPATIENT
Start: 2023-02-10 | End: 2023-02-19 | Stop reason: HOSPADM

## 2023-02-10 RX ORDER — FENTANYL CITRATE 50 UG/ML
25 INJECTION, SOLUTION INTRAMUSCULAR; INTRAVENOUS EVERY 5 MIN PRN
Status: DISCONTINUED | OUTPATIENT
Start: 2023-02-10 | End: 2023-02-10 | Stop reason: HOSPADM

## 2023-02-10 RX ORDER — SODIUM CHLORIDE 0.9 % (FLUSH) 0.9 %
5-40 SYRINGE (ML) INJECTION EVERY 12 HOURS SCHEDULED
Status: DISCONTINUED | OUTPATIENT
Start: 2023-02-10 | End: 2023-02-10 | Stop reason: HOSPADM

## 2023-02-10 RX ORDER — FENTANYL CITRATE 50 UG/ML
50 INJECTION, SOLUTION INTRAMUSCULAR; INTRAVENOUS EVERY 5 MIN PRN
Status: DISCONTINUED | OUTPATIENT
Start: 2023-02-10 | End: 2023-02-10 | Stop reason: HOSPADM

## 2023-02-10 RX ORDER — ONDANSETRON 2 MG/ML
INJECTION INTRAMUSCULAR; INTRAVENOUS PRN
Status: DISCONTINUED | OUTPATIENT
Start: 2023-02-10 | End: 2023-02-10 | Stop reason: SDUPTHER

## 2023-02-10 RX ORDER — ROCURONIUM BROMIDE 10 MG/ML
INJECTION, SOLUTION INTRAVENOUS PRN
Status: DISCONTINUED | OUTPATIENT
Start: 2023-02-10 | End: 2023-02-10 | Stop reason: SDUPTHER

## 2023-02-10 RX ORDER — SODIUM CHLORIDE, SODIUM LACTATE, POTASSIUM CHLORIDE, AND CALCIUM CHLORIDE .6; .31; .03; .02 G/100ML; G/100ML; G/100ML; G/100ML
500 INJECTION, SOLUTION INTRAVENOUS ONCE
Status: COMPLETED | OUTPATIENT
Start: 2023-02-10 | End: 2023-02-11

## 2023-02-10 RX ORDER — LABETALOL HYDROCHLORIDE 5 MG/ML
10 INJECTION, SOLUTION INTRAVENOUS
Status: DISCONTINUED | OUTPATIENT
Start: 2023-02-10 | End: 2023-02-10 | Stop reason: HOSPADM

## 2023-02-10 RX ORDER — ONDANSETRON 2 MG/ML
4 INJECTION INTRAMUSCULAR; INTRAVENOUS
Status: DISCONTINUED | OUTPATIENT
Start: 2023-02-10 | End: 2023-02-10 | Stop reason: HOSPADM

## 2023-02-10 RX ORDER — GLYCOPYRROLATE 0.2 MG/ML
INJECTION INTRAMUSCULAR; INTRAVENOUS PRN
Status: DISCONTINUED | OUTPATIENT
Start: 2023-02-10 | End: 2023-02-10 | Stop reason: SDUPTHER

## 2023-02-10 RX ORDER — PROCHLORPERAZINE EDISYLATE 5 MG/ML
5 INJECTION INTRAMUSCULAR; INTRAVENOUS
Status: DISCONTINUED | OUTPATIENT
Start: 2023-02-10 | End: 2023-02-10 | Stop reason: HOSPADM

## 2023-02-10 RX ADMIN — PIPERACILLIN AND TAZOBACTAM 3375 MG: 3; .375 INJECTION, POWDER, LYOPHILIZED, FOR SOLUTION INTRAVENOUS at 16:59

## 2023-02-10 RX ADMIN — FOLIC ACID 1 MG: 1 TABLET ORAL at 08:34

## 2023-02-10 RX ADMIN — VALSARTAN 40 MG: 80 TABLET, FILM COATED ORAL at 08:34

## 2023-02-10 RX ADMIN — PIPERACILLIN AND TAZOBACTAM 3375 MG: 3; .375 INJECTION, POWDER, LYOPHILIZED, FOR SOLUTION INTRAVENOUS at 08:40

## 2023-02-10 RX ADMIN — PHENYLEPHRINE HYDROCHLORIDE 200 MCG: 10 INJECTION, SOLUTION INTRAMUSCULAR; INTRAVENOUS; SUBCUTANEOUS at 11:51

## 2023-02-10 RX ADMIN — PHENYLEPHRINE HYDROCHLORIDE 200 MCG: 10 INJECTION, SOLUTION INTRAMUSCULAR; INTRAVENOUS; SUBCUTANEOUS at 11:35

## 2023-02-10 RX ADMIN — VANCOMYCIN HYDROCHLORIDE 1250 MG: 10 INJECTION, POWDER, LYOPHILIZED, FOR SOLUTION INTRAVENOUS at 13:36

## 2023-02-10 RX ADMIN — POTASSIUM CHLORIDE 20 MEQ: 20 TABLET, EXTENDED RELEASE ORAL at 08:34

## 2023-02-10 RX ADMIN — Medication 45 MG: at 13:30

## 2023-02-10 RX ADMIN — PHENYLEPHRINE HYDROCHLORIDE 200 MCG: 10 INJECTION, SOLUTION INTRAMUSCULAR; INTRAVENOUS; SUBCUTANEOUS at 11:28

## 2023-02-10 RX ADMIN — SODIUM CHLORIDE, POTASSIUM CHLORIDE, SODIUM LACTATE AND CALCIUM CHLORIDE 500 ML: 600; 310; 30; 20 INJECTION, SOLUTION INTRAVENOUS at 13:57

## 2023-02-10 RX ADMIN — DEXAMETHASONE SODIUM PHOSPHATE 4 MG: 4 INJECTION, SOLUTION INTRAMUSCULAR; INTRAVENOUS at 11:37

## 2023-02-10 RX ADMIN — VANCOMYCIN HYDROCHLORIDE 1250 MG: 10 INJECTION, POWDER, LYOPHILIZED, FOR SOLUTION INTRAVENOUS at 02:30

## 2023-02-10 RX ADMIN — SODIUM CHLORIDE, PRESERVATIVE FREE 10 ML: 5 INJECTION INTRAVENOUS at 23:00

## 2023-02-10 RX ADMIN — GLYCOPYRROLATE 0.4 MG: 0.2 INJECTION, SOLUTION INTRAMUSCULAR; INTRAVENOUS at 11:22

## 2023-02-10 RX ADMIN — ASPIRIN 81 MG 81 MG: 81 TABLET ORAL at 08:34

## 2023-02-10 RX ADMIN — SUGAMMADEX 100 MG: 100 INJECTION, SOLUTION INTRAVENOUS at 12:02

## 2023-02-10 RX ADMIN — IPRATROPIUM BROMIDE AND ALBUTEROL SULFATE 1 AMPULE: 2.5; .5 SOLUTION RESPIRATORY (INHALATION) at 00:20

## 2023-02-10 RX ADMIN — PROPOFOL 100 MG: 10 INJECTION, EMULSION INTRAVENOUS at 11:18

## 2023-02-10 RX ADMIN — SUCCINYLCHOLINE CHLORIDE 140 MG: 20 INJECTION, SOLUTION INTRAMUSCULAR; INTRAVENOUS; PARENTERAL at 11:19

## 2023-02-10 RX ADMIN — ONDANSETRON 4 MG: 2 INJECTION INTRAMUSCULAR; INTRAVENOUS at 11:43

## 2023-02-10 RX ADMIN — POTASSIUM CHLORIDE 20 MEQ: 20 TABLET, EXTENDED RELEASE ORAL at 17:03

## 2023-02-10 RX ADMIN — CARVEDILOL 3.12 MG: 3.12 TABLET, FILM COATED ORAL at 08:34

## 2023-02-10 RX ADMIN — PHENYLEPHRINE HYDROCHLORIDE 200 MCG: 10 INJECTION, SOLUTION INTRAMUSCULAR; INTRAVENOUS; SUBCUTANEOUS at 11:25

## 2023-02-10 RX ADMIN — SODIUM CHLORIDE, PRESERVATIVE FREE 10 ML: 5 INJECTION INTRAVENOUS at 08:41

## 2023-02-10 RX ADMIN — SODIUM CHLORIDE 10 ML: 9 INJECTION, SOLUTION INTRAVENOUS at 16:58

## 2023-02-10 RX ADMIN — ROCURONIUM BROMIDE 5 MG: 10 INJECTION INTRAVENOUS at 11:18

## 2023-02-10 RX ADMIN — PHENYLEPHRINE HYDROCHLORIDE 100 MCG: 10 INJECTION, SOLUTION INTRAMUSCULAR; INTRAVENOUS; SUBCUTANEOUS at 11:23

## 2023-02-10 RX ADMIN — PHENYLEPHRINE HYDROCHLORIDE 100 MCG: 10 INJECTION, SOLUTION INTRAMUSCULAR; INTRAVENOUS; SUBCUTANEOUS at 11:46

## 2023-02-10 RX ADMIN — SODIUM CHLORIDE, POTASSIUM CHLORIDE, SODIUM LACTATE AND CALCIUM CHLORIDE 500 ML: 600; 310; 30; 20 INJECTION, SOLUTION INTRAVENOUS at 22:27

## 2023-02-10 ASSESSMENT — PAIN DESCRIPTION - ONSET: ONSET: ON-GOING

## 2023-02-10 ASSESSMENT — PAIN SCALES - GENERAL
PAINLEVEL_OUTOF10: 3
PAINLEVEL_OUTOF10: 0

## 2023-02-10 ASSESSMENT — PAIN DESCRIPTION - PAIN TYPE: TYPE: ACUTE PAIN

## 2023-02-10 ASSESSMENT — ENCOUNTER SYMPTOMS
SHORTNESS OF BREATH: 1
SHORTNESS OF BREATH: 1
COUGH: 1

## 2023-02-10 ASSESSMENT — PAIN - FUNCTIONAL ASSESSMENT: PAIN_FUNCTIONAL_ASSESSMENT: NONE - DENIES PAIN

## 2023-02-10 ASSESSMENT — PAIN DESCRIPTION - ORIENTATION: ORIENTATION: UPPER

## 2023-02-10 ASSESSMENT — PAIN DESCRIPTION - DIRECTION: RADIATING_TOWARDS: NECK

## 2023-02-10 ASSESSMENT — PAIN DESCRIPTION - LOCATION: LOCATION: CHEST

## 2023-02-10 ASSESSMENT — PAIN DESCRIPTION - FREQUENCY: FREQUENCY: CONTINUOUS

## 2023-02-10 ASSESSMENT — PAIN DESCRIPTION - DESCRIPTORS: DESCRIPTORS: DISCOMFORT

## 2023-02-10 NOTE — ANESTHESIA PRE PROCEDURE
Department of Anesthesiology  Preprocedure Note       Name:  Michelle Flaherty   Age:  56 y.o.  :  1975                                          MRN:  [id number]         Date:  2/10/2023      Surgeon: Rody Dawson):  Kendra Salmon MD    Procedure: Procedure(s):  BRONCHOSCOPY ALVEOLAR LAVAGE  BRONCHOSCOPY/TRANSBRONCHIAL LUNG BIOPSY    Medications prior to admission:   Prior to Admission medications    Medication Sig Start Date End Date Taking? Authorizing Provider   apixaban (ELIQUIS) 5 MG TABS tablet Take 5 mg by mouth 2 times daily   Yes Historical Provider, MD   methadone (DOLOPHINE) 10 MG tablet Take 45 mg by mouth daily.    Yes Historical Provider, MD   aspirin 81 MG chewable tablet Take 81 mg by mouth daily    Historical Provider, MD   naproxen (NAPROSYN) 500 MG tablet Take 1 tablet by mouth 2 times daily  Patient not taking: Reported on 2023 3/29/19   ISHA Paz CNP   methocarbamol (ROBAXIN) 500 MG tablet Take 1 tablet by mouth 3 times daily  Patient not taking: Reported on 2023 3/29/19   ISHA Paz CNP       Current medications:    Current Facility-Administered Medications   Medication Dose Route Frequency Provider Last Rate Last Admin    prochlorperazine (COMPAZINE) injection 10 mg  10 mg IntraVENous Q6H PRN Colton Mcgregor MD        piperacillin-tazobactam (ZOSYN) 3,375 mg in sodium chloride 0.9 % 50 mL IVPB (mini-bag)  3,375 mg IntraVENous Q8H Kendra Salmon MD 12.5 mL/hr at 02/10/23 0840 3,375 mg at 02/10/23 0840    vancomycin (VANCOCIN) 1,250 mg in sodium chloride 0.9 % 250 mL IVPB  1,250 mg IntraVENous Q12H Kendra Salmon MD   Stopped at 02/10/23 0400    alteplase (CATHFLO) 10 mg in sodium chloride 0.9 % 30 mL  10 mg IntraPLEUral BID Kendra Salmon MD   10 mg at 23 1622    And    dornase alpha (PULMOZYME) 5 mg in sterile water 30 mL  5 mg IntraPLEUral BID Kendra Salmon MD   5 mg at 23 1622    enoxaparin (LOVENOX) injection 40 mg  40 mg SubCUTAneous Daily Kendra Salmon MD   40 mg at 02/09/23 2030    sodium chloride flush 0.9 % injection 5-40 mL  5-40 mL IntraVENous 2 times per day Vania Collins MD   10 mL at 02/10/23 0841    sodium chloride flush 0.9 % injection 5-40 mL  5-40 mL IntraVENous PRN Vania Collins MD   10 mL at 02/09/23 1301    0.9 % sodium chloride infusion   IntraVENous PRN Vania Collins  mL/hr at 02/09/23 1501 25 mL at 02/09/23 1501    polyethylene glycol (GLYCOLAX) packet 17 g  17 g Oral Daily PRN Vania Collins MD        acetaminophen (TYLENOL) tablet 650 mg  650 mg Oral Q6H PRN Vnaia Collins MD        Or   Verl Raw acetaminophen (TYLENOL) suppository 650 mg  650 mg Rectal Q6H PRN Vania Collins MD        potassium chloride (KLOR-CON M) extended release tablet 40 mEq  40 mEq Oral PRN Vania Collins MD        Or    potassium bicarb-citric acid (EFFER-K) effervescent tablet 40 mEq  40 mEq Oral PRN Vania Collins MD        Or    potassium chloride 10 mEq/100 mL IVPB (Peripheral Line)  10 mEq IntraVENous PRN Vania Collins MD        magnesium sulfate 2000 mg in 50 mL IVPB premix  2,000 mg IntraVENous PRN Vania Collins MD        perflutren lipid microspheres (DEFINITY) injection 1.5 mL  1.5 mL IntraVENous ONCE PRN Vania Collins MD        aluminum & magnesium hydroxide-simethicone (MAALOX) 200-200-20 MG/5ML suspension 30 mL  30 mL Oral Q6H PRN Vania Collins MD        valsartan (DIOVAN) tablet 40 mg  40 mg Oral 2 times per day Vania Collins MD   40 mg at 02/10/23 0834    carvedilol (COREG) tablet 3.125 mg  3.125 mg Oral BID GORDO Jefferson MD   3.125 mg at 02/10/23 0834    potassium chloride (KLOR-CON M) extended release tablet 20 mEq  20 mEq Oral BID  Rhona Jefferson MD   20 mEq at 02/10/23 0834    aspirin chewable tablet 81 mg  81 mg Oral Daily Rhona Jefferson MD   81 mg at 02/10/23 0834    methadone (DOLOPHINE) 10 MG/ML solution 45 mg  45 mg Oral Daily Cuong Estrada MD   45 mg at 41/73/86 8565    folic acid (FOLVITE) tablet 1 mg  1 mg Oral Daily Maged Daniel MD   1 mg at 02/10/23 9572       Allergies:  No Known Allergies    Problem List:    Patient Active Problem List   Diagnosis Code    Acute heart failure, unspecified heart failure type (HCC) I50.9    Normocytic anemia D64.9    Chronic anticoagulation Z79.01    HTN (hypertension) I10    Opioid dependence on agonist therapy ([facility] F11.20    Hx pulmonary embolism Z86.711    Acute pulmonary edema (HCC) J81.0    Hemothorax on right J94.2    Pleural effusion J90       Past Medical History:        Diagnosis Date    htn     IVDU (intravenous drug user)     PE (pulmonary thromboembolism) ([facility]        Past Surgical History:  History reviewed. No pertinent surgical history. Social History:    Social History     Tobacco Use    Smoking status: Every Day     Packs/day: 0.50     Types: Cigarettes    Smokeless tobacco: Never   Substance Use Topics    Alcohol use: Not Currently                                Ready to quit: Not Answered  Counseling given: Not Answered      Vital Signs (Current):   Vitals:    02/10/23 0802 02/10/23 0900 02/10/23 0923 02/10/23 1040   BP:    (!) 89/52   Pulse: 65 62 62 66   Resp:  18  18   Temp:    98.1 °F (36.7 °C)   TempSrc:    Temporal   SpO2:   97% 98%   Weight:       Height:                                                  BP Readings from Last 3 Encounters:   02/10/23 (!) 89/52   03/29/19 137/74       NPO Status:                                                                                 BMI:   Wt Readings from Last 3 Encounters:   02/10/23 210 lb 8.6 oz (95.5 kg)   03/29/19 195 lb (88.5 kg)     Body mass index is 30.21 kg/m².     CBC:   Lab Results   Component Value Date/Time    WBC 9.0 02/09/2023 05:06 AM    RBC 3.34 02/09/2023 05:06 AM    HGB 10.7 02/09/2023 05:06 AM    HCT 33.7 02/09/2023 05:06 AM    HCT 33.9 02/09/2023 05:06 AM    MCV 101.2 02/09/2023 05:06 AM    RDW 18.8 02/09/2023 05:06 AM     02/09/2023 05:06 AM       CMP:   Lab Results   Component Value Date/Time     02/10/2023 06:36 AM    K 3.8 02/10/2023 06:36 AM    CL 92 02/10/2023 06:36 AM    CO2 30 02/10/2023 06:36 AM    BUN 19 02/10/2023 06:36 AM    CREATININE 1.0 02/10/2023 06:36 AM    LABGLOM >60 02/10/2023 06:36 AM    GLUCOSE 81 02/10/2023 06:36 AM    PROT 6.8 02/08/2023 05:19 AM    CALCIUM 8.1 02/10/2023 06:36 AM       POC Tests: No results for input(s): POCGLU, POCNA, POCK, POCCL, POCBUN, POCHEMO, POCHCT in the last 72 hours. Coags:   Lab Results   Component Value Date/Time    PROTIME 27.7 02/08/2023 05:19 AM    INR 2.57 02/08/2023 05:19 AM       HCG (If Applicable): No results found for: PREGTESTUR, PREGSERUM, HCG, HCGQUANT     ABGs: No results found for: PHART, PO2ART, GFV7TTB, NLH9CIX, BEART, U7QXSXOF     Type & Screen (If Applicable):  No results found for: LABABO, LABRH    Drug/Infectious Status (If Applicable):  No results found for: HIV, HEPCAB    COVID-19 Screening (If Applicable):   Lab Results   Component Value Date/Time    COVID19 NOT DETECTED 02/08/2023 01:01 AM           Anesthesia Evaluation  Patient summary reviewed and Nursing notes reviewed no history of anesthetic complications:   Airway: Mallampati: II  TM distance: >3 FB   Neck ROM: full  Mouth opening: > = 3 FB   Dental:    (+) poor dentition  Comment: Teeth in poor shape    Pulmonary:   (+) shortness of breath:                             Cardiovascular:    (+) hypertension:, CHF:,                   Neuro/Psych:   Negative Neuro/Psych ROS              GI/Hepatic/Renal: Neg GI/Hepatic/Renal ROS            Endo/Other: Negative Endo/Other ROS                    Abdominal:             Vascular: negative vascular ROS. Other Findings:           Anesthesia Plan      general     ASA 4       Induction: intravenous.     MIPS: Postoperative opioids intended and Prophylactic antiemetics administered. Anesthetic plan and risks discussed with patient. Plan discussed with CRNA.     Attending anesthesiologist reviewed and agrees with Preprocedure content                Evans Vargas MD   2/10/2023

## 2023-02-10 NOTE — PROGRESS NOTES
[facility] Daily Progress Note      Admit Date:  2/7/2023    Subjective:  Mr. David Valle was seen and examined. F/U CHF. Breathing some better. No chest pain.      Objective:   BP (!) 95/52   Pulse 65   Temp 97.9 °F (36.6 °C) (Oral)   Resp 20   Ht 5' 10\" (1.778 m)   Wt 210 lb 8.6 oz (95.5 kg)   SpO2 96%   BMI 30.21 kg/m²     Intake/Output Summary (Last 24 hours) at 2/10/2023 1544  Last data filed at 2/10/2023 1526  Gross per 24 hour   Intake 860 ml   Output 1874 ml   Net -1014 ml       TELEMETRY: Sinus     Physical Exam:  General:  Awake, alert, NAD  Skin:  Warm and dry  Neck:  JVP difficult  Chest:  decreased BS,crackles, respiration normal at rest.  Cardiovascular:  RRR S1S2  Abdomen:  Soft nontender  Extremities:  no edema    Medications:    lactated ringers bolus  500 mL IntraVENous Once    piperacillin-tazobactam  3,375 mg IntraVENous Q8H    vancomycin  1,250 mg IntraVENous Q12H    alteplase (ACTIVASE) syringe  10 mg IntraPLEUral BID    And    dornase (PULMOZYME) syringe  5 mg IntraPLEUral BID    enoxaparin  40 mg SubCUTAneous Daily    sodium chloride flush  5-40 mL IntraVENous 2 times per day    valsartan  40 mg Oral 2 times per day    carvedilol  3.125 mg Oral BID WC    potassium chloride  20 mEq Oral BID WC    aspirin  81 mg Oral Daily    methadone  45 mg Oral Daily    folic acid  1 mg Oral Daily      sodium chloride Stopped (02/10/23 1216)     prochlorperazine, sodium chloride flush, sodium chloride, polyethylene glycol, acetaminophen **OR** acetaminophen, potassium chloride **OR** potassium alternative oral replacement **OR** potassium chloride, magnesium sulfate, perflutren lipid microspheres, aluminum & magnesium hydroxide-simethicone    Lab Data:  CBC:   Recent Labs     02/08/23  0024 02/09/23  0506   WBC 8.4 9.0   HGB 10.9* 10.7*   HCT 33.2* 33.9*  33.7*   MCV 99.2 101.2*    174     BMP:   Recent Labs     02/08/23  0519 02/09/23  0506 02/10/23  0636   * 133* 131*   K 3.6 3.9 3.8   CL 90* 90* 92*   CO2 31 29 30   BUN 11 16 19   CREATININE 1.0 1.1 1.0     LIVER PROFILE: No results for input(s): AST, ALT, LIPASE, BILIDIR, BILITOT, ALKPHOS in the last 72 hours. Invalid input(s): AMYLASE,  ALB  PT/INR:   Recent Labs     02/08/23  0519   PROTIME 27.7*   INR 2.57*     APTT: No results for input(s): APTT in the last 72 hours. BNP:  No results for input(s): BNP in the last 72 hours. IMAGING:     Assessment:  Patient Active Problem List    Diagnosis Date Noted    Acute heart failure, unspecified heart failure type ([facility] 02/08/2023    Normocytic anemia 02/08/2023    Chronic anticoagulation 02/08/2023    HTN (hypertension) 02/08/2023    Opioid dependence on agonist therapy ([facility] 02/08/2023    Hx pulmonary embolism 02/08/2023    Acute pulmonary edema ([facility] 02/08/2023    Hemothorax on right 02/08/2023    Pleural effusion 02/08/2023       Plan:  Breathing through night. Still on high flow O2. Continue diuresis. Wean O2. Echo shows normal LV function. On Coreg/diovan. Cr stable. CT in place. Cytology pending. Pulmonary following. Bronch today.       Core Measures:  Discharge instructions:   LVEF documented:   ACEI for LV dysfunction:   Smoking Cessation:    Kaylan Castaneda MD, MD 2/10/2023 3:44 PM

## 2023-02-10 NOTE — PROGRESS NOTES
Clinical Pharmacy Progress Note    Vancomycin - Management by Pharmacy    Consult Date(s): 2/9/23  Consulting Provider(s): Dr. Rosetta Muhammad / Plan  PNA, Loculated pleural effusion - Vancomycin  Concurrent Antimicrobials:   Zosyn - Day #2  Day of Vanc Therapy / Ordered Duration: #2 of 7  Current Dosing Method: Bayesian-Guided AUC Dosing  Therapeutic Goal: -600 mg/L*hr  Current Dose / Plan:   On 1250mg IV q12h. Renal function stable; SCr 1 this AM.  Level this AM = 20.9 mg/L - drawn ~4h after prior dose. Calculated AUC is 563 mg/L*h with steady-state trough of 16.9 mg/L. Will continue current dose; calculated AUC is within goal.  Plan to repeat level in next ~2-3 days, as clinically appropriate. Will continue to monitor clinical condition and make adjustments to regimen as appropriate. Please call with questions--  Husam Garcia PharmD, BCPS  Wireless: [id number]   2/10/2023 8:25 AM        Interval update: S/p Alteplase/dornase in CT by pulmonology 2/9. Rapid response called overnight d/t SOB and increasing O2 requirement. Chest tube placed to water seal / suction and O2 requirements improved. EKG overnight showed sinus bradycardia with prolonged QTc interval.    Subjective/Objective:   Etta Ramires is a 56 y.o. male with a PMHx significant for chronic PE (on Jeremías), IVDU, hepatitis C, COPD, HTN who presented to ED with SOB x 6-7 days. Admitted 2/7 with new onset heart failure. Pulmonology consulted this admission for large R pleural fluid collection - s/p chest tube placement on 2/8. Started on broad spectrum ABx on 2/9. Pharmacy is consulted to dose Vancomycin.     Ht Readings from Last 1 Encounters:   02/08/23 5' 10\" (1.778 m)     Wt Readings from Last 1 Encounters:   02/10/23 210 lb 8.6 oz (95.5 kg)     Current & Prior Antimicrobial Regimen(s):  Zosyn (2/9-current)  Vancomycin - Pharmacy to dose  1250mg IV q12h (2/9-current)    Vancomycin Level(s) / Doses:    Date Time Dose Type of Level / Level Interpretation   2/10 0636 1250mg IV q12h Random = 20.9 mg/L Level drawn ~4h after prior dose  Calculated AUC = 563 mg/L*h with steady-state trough of 16.9 mg/L  Continue same dose   Note: Serum levels collected for AUC-based dosing may be high if collected in close proximity to the dose administered. This is not necessarily indicative of toxicity. Cultures & Sensitivities:    Date Site Micro Susceptibility / Result   2/8 COVID-19 + influenza, Rapid Not detected     Pleural fluid      MRSA nasal PCR      Strep pneumo antigen      Legionella antigen       Recent Labs     02/08/23  0024 02/08/23  0519 02/09/23  0506 02/10/23  0636   CREATININE 0.9 1.0 1.1 1.0   BUN 11 11 16 19   WBC 8.4  --  9.0  --        Estimated Creatinine Clearance: 107 mL/min (based on SCr of 1 mg/dL).     Additional Lab Values / Findings of Note:    Recent Labs     02/08/23  0024 02/09/23  0506   PROCAL 0.19* 0.15

## 2023-02-10 NOTE — PROGRESS NOTES
Rapid response called on patient for shortness of breath. Chest tube placed to suction. Patient requiring Highflow @ 15L and NRB to keep spo2 above 90%. After chest tube to suction was able to remove NRB and titrate HFNC to 10L. HHN with Duoneb also given for wheezing. Improved aeration post tx.

## 2023-02-10 NOTE — PROGRESS NOTES
Pulmonology Progress Note  PGY-3    Admit Date: 2/7/2023  Hospital Day: 4  Diet: Diet NPO  Code Status: Full Code       Interval history:  Patient was seen and examined this morning. S/p chest tube was placement on 02/08 and tPA dornase on 02/09. Patient had 912 cc bloody output from chest tube. Patient remained afebrile. He is on 8L of O2 this morning. Chest X ray this morning showed stable appearance of the chest with residual right pleural effusion with right chest tube in place and trace associated right basilar pleural gas. Patient reports that his SOB was worse overnight and it has improved this morning. Denies fevers, chills, chest pain, nausea, vomiting, diarrhea, or constipation.       Medications:   Scheduled Meds:   piperacillin-tazobactam  3,375 mg IntraVENous Q8H    vancomycin  1,250 mg IntraVENous Q12H    alteplase (ACTIVASE) syringe  10 mg IntraPLEUral BID    And    dornase (PULMOZYME) syringe  5 mg IntraPLEUral BID    enoxaparin  40 mg SubCUTAneous Daily    sodium chloride flush  5-40 mL IntraVENous 2 times per day    valsartan  40 mg Oral 2 times per day    carvedilol  3.125 mg Oral BID WC    potassium chloride  20 mEq Oral BID WC    aspirin  81 mg Oral Daily    methadone  45 mg Oral Daily    folic acid  1 mg Oral Daily       Continuous Infusions:   sodium chloride 25 mL (02/09/23 1501)       PRN Meds:  prochlorperazine, sodium chloride flush, sodium chloride, polyethylene glycol, acetaminophen **OR** acetaminophen, potassium chloride **OR** potassium alternative oral replacement **OR** potassium chloride, magnesium sulfate, perflutren lipid microspheres, aluminum & magnesium hydroxide-simethicone    Vital/I&O/Physical examination:   VS:  BP 99/65   Pulse 62   Temp 98.1 °F (36.7 °C) (Oral)   Resp 18   Ht 5' 10\" (1.778 m)   Wt 210 lb 8.6 oz (95.5 kg)   SpO2 97%   BMI 30.21 kg/m²     I/O:    Intake/Output Summary (Last 24 hours) at 2/10/2023 0925  Last data filed at 2/10/2023 0900  Gross per 24 hour   Intake 530 ml   Output 1762 ml   Net -1232 ml         PE:  Physical Exam  Vitals reviewed. HENT:      Mouth/Throat:      Mouth: Mucous membranes are moist.   Eyes:      Extraocular Movements: Extraocular movements intact. Conjunctiva/sclera: Conjunctivae normal.      Pupils: Pupils are equal, round, and reactive to light. Cardiovascular:      Rate and Rhythm: Normal rate and regular rhythm. Pulses: Normal pulses. Heart sounds: Normal heart sounds. Pulmonary:      Effort: Pulmonary effort is normal.      Breath sounds: Normal breath sounds. Comments: On 8L of oxygen  Abdominal:      Palpations: Abdomen is soft. Musculoskeletal:         General: Normal range of motion. Cervical back: Normal range of motion and neck supple. Neurological:      General: No focal deficit present. Mental Status: He is alert and oriented to person, place, and time. Labs & Imaging:   LABS:  Renal:   Recent Labs     02/08/23  0519 02/09/23  0506 02/10/23  0636   * 133* 131*   K 3.6 3.9 3.8   CL 90* 90* 92*   CO2 31 29 30   BUN 11 16 19   CREATININE 1.0 1.1 1.0   GLUCOSE 100* 87 81   ANIONGAP 12 14 9       CBC:   Recent Labs     02/08/23  0024 02/09/23  0506   WBC 8.4 9.0   HGB 10.9* 10.7*   HCT 33.2* 33.9*  33.7*    174   MCV 99.2 101.2*                              Hepatic: No results for input(s): AST, ALT, ALB, BILITOT, ALKPHOS in the last 72 hours. Troponin:   Recent Labs     02/08/23  0024 02/08/23 0519   TROPONINI <0.01 <0.01       BNP: No results for input(s): BNP in the last 72 hours. Lipids:   Recent Labs     02/09/23  0506   CHOL 139   HDL 14*     INR:   Recent Labs     02/08/23 0519   INR 2.57*       Lactate: No results for input(s): LACTATE in the last 72 hours. ABGs:No results for input(s): PHART, KTK5XEN, PO2ART, FND7IQV, BEART, THGBART, C6SDRMTV, PDV1NFQ in the last 72 hours.     UA:  Recent Labs     02/08/23  2213   PHUR 6.0          IMAGING:  XR CHEST PORTABLE   Final Result      1. No change in appearance of bilateral airspace disease. 2.  Right inferior thoracic pigtail catheter likely pleural drain with small to moderate right pleural effusion unchanged. XR CHEST PORTABLE   Final Result   1. As above. XR CHEST PORTABLE   Final Result      1. Stable appearance of the chest with residual right pleural effusion with right chest tube in place and trace associated right basilar pleural gas. 2.  Extensive bilateral airspace disease in the left greater than right lungs. XR CHEST PORTABLE   Final Result      1. Slightly decreased loculated right pleural effusion status post right chest tube placement. There is a small amount of associated right pleural gas. 2.  Persistent diffuse airspace disease in the left lung and right mid and lower lung airspace disease. CT CHEST PULMONARY EMBOLISM W CONTRAST   Final Result      Linear, eccentric, and left leg filling defects in the right lower lobe segmental and subsegmental pulmonary arteries are suggestive of chronic pulmonary emboli. No definite acute pulmonary emboli identified. Extensive groundglass opacification of both lungs is suspicious for atypical pneumonia although a component of asymmetric pulmonary edema could have a similar appearance. Large complex loculated right pleural fluid collection. Recommend diagnostic thoracentesis, as hemothorax or malignant effusion cannot be excluded. Trace left pleural effusion. INCIDENTAL FINDINGS: None. XR CHEST PORTABLE   Final Result      Extensive bilateral airspace disease, which may represent pneumonia or pulmonary edema. Cardiomegaly. Moderate to large right and questionable small left pleural effusions.       XR CHEST PORTABLE    (Results Pending)       Assessment & Plan:    Shaheen Lewis is a 56 y.o. male with PMH  significant for PE on Eliquis, IVDU, hep C, COPD, hypertension presented to the ED with chief complaint of shortness of breath. Pulmonology was consulted for large complex loculated right pleural fluid collection. Large complex loculated right pleural fluid:  -Patient presented with shortness of breath for the past 8 days.  -He has been having cough and whitish sputum production. Patient has history of PE.  -CT chest showed linear, eccentric, and left lobe filling defects in the right lower lobe segmental and subsegmental pulmonary arteries are suggestive of chronic pulmonary emboli. Extensive groundglass opacification of both lungs is suspicious for atypical pneumonia although a component of asymmetric pulmonary edema could have a similar appearance. Large complex loculated right pleural fluid collection.    - S/p chest tube placement. S/p tPA dornase yesterday and patient had 912 cc bloody output from chest tube in the past 24 hours. - Pleural fluid analysis showed , total protein of 4.3, glucose 20 which is consistent with exudative pleural effusion/empyema or hemothorax. - Strep pneumo, legionella urine antigen, MRSA nasal prob, SAVANNAH pending  - HIV screen negative, RF negative, sed rate 85  - Start Van and zosyn.   - Plan for Bronchoscopy this morning. Code Status: Full Code  Diet NPO      This patient will be discussed with attending, Dr. Ramsey Franklin MD.    Myra Troncoso MD MD, PGY- 3  Contact via [facility]  2/10/2023,  9:25 AM       Patient seen, examined and discussed with the resident and I agree with the assessment and plan as edited above. Chest tube has now put out about a liter of bloody fluid, the bulk of which came after the tpa/dornase instillation yesterday. Chest xray shows persistent to slightly worse airspace disease on the left but improvement in the loculated fluid with a small area of hydropneumothorax on the right. Plan for bronchoscopy with BAL and transbronchial biopsies today. Pre-Procedure Assessment / Plan:  ASA CLASS      IV.  Severe Systemic Disease which is a threat to life.      Mallampati score:  3      Jim Roman MD

## 2023-02-10 NOTE — PROGRESS NOTES
Contacted Family waiting room per Anali Calixto no one has checked in for Mr Mika Cartagena.  Called Girl friend and she gave me a new number for Mom and no one answered and voice mail on recipients phone was not set up

## 2023-02-10 NOTE — SIGNIFICANT EVENT
RAPID RESPONSE    Rapid response was called on Agustín Segura at 2347 for shortness of breath and increasing supplemental oxygen requirement. On ICU team arrival, patient was seen sitting upright in bed, periodically coughing, tachypneic with supraclavicular retractions; unable to speak in full sentences without frequent pauses. He reported that he was sitting at rest in bed when he developed dyspnea and substernal chest tightness in which there was a concurrent SpO2 desaturation, lasting less than 30 sec. He reports similar episodes in the past which resolved with non-specific repositioning. On auscultation, he was found to have good tracheal sounds, diminished breath sounds throughout the right lung, biphasic high-pitched wheezes at the left lingula; no perioral cyanosis. At this time, /80, P 69, SpO2 of 100% on 15 L/min NRB + HFNC. Prior to the rapid response, the patient was saturating well on 7 L/min HFNC. On inspection, the right-sided chest tube was water sealed and there was at total of 825cc of sanguinous drainage in the collection reservoir. The chest tube was placed on suction. He was then able to be transitioned off of the NRB and decreased to 10 L/min HFNC. DuoNeb treatment was administered with reduction in wheezing. STAT CXR demonstrated unchanged left lung diffuse airspace disease, decreased right-sided pleural effusion, mild right base airspace disease, and pigtail catheter within the inferior lateral right pleural space with an adjacent small PTX. STAT EKG demonstrated sinus bradycardia with prolonged QTc interval of 594 ms, but no apparent pattern c/w a pulmonary embolus. Patient has a history of pulmonary emboli for which he takes Eliquis inconsistently at home. Since admission, he has been consistently on anticoagulation. Tamy Smallwood.  Carmela Coon MD, [address], [facility]  Internal Medicine, PGY-1

## 2023-02-10 NOTE — FLOWSHEET NOTE
PACU Transfer Note    Vitals:    02/10/23 1300   BP: (!) 92/54   Pulse: 94   Resp: 25   Temp: 98 °F (36.7 °C)   SpO2: 94%   BP within 20% of baseline     In: 810 [P.O.:35; I.V.:655]  Out: 132     Pain assessment:  none, Patient satisfied, asking when he can go back to his room. Pain Level: 0    Report given to Receiving unit [facility] from PCU. Patients Girlfriend updated on patients status, tried to phone patients mother to update no answer.  Patient transferred back to [address] [place] [address] PACU RN.     2/10/2023 1:11 PM

## 2023-02-10 NOTE — PROGRESS NOTES
Patient received from OR to PACU 13 s/p  BRONCHOSCOPY ALVEOLAR LAVAGE with Dr. Lila Le on PACU monitor. Report received from CRNA/ RN/ OR staff. Per report  patient required BP support intra op. On arrival, patient states pain is  0/10. Patient arrived agitated not sitting still BP was reading 161/148 due to patients restlessness, took 2 more readings after patient relaxed and received /64.

## 2023-02-10 NOTE — PLAN OF CARE
Problem: Safety - Adult  Goal: Free from fall injury  Outcome: Progressing  Flowsheets (Taken 2/10/2023 0038)  Free From Fall Injury: Instruct family/caregiver on patient safety  Note: Fall protocol in place      Problem: Respiratory - Adult  Goal: Achieves optimal ventilation and oxygenation  2/10/2023 0039 by Marcus Alvarez RN  Outcome: Progressing  Flowsheets (Taken 2/10/2023 0039)  Achieves optimal ventilation and oxygenation:   Assess for changes in respiratory status   Assess for changes in mentation and behavior   Position to facilitate oxygenation and minimize respiratory effort   Oxygen supplementation based on oxygen saturation or arterial blood gases   Encourage broncho-pulmonary hygiene including cough, deep breathe, incentive spirometry   Assess the need for suctioning and aspirate as needed   Assess and instruct to report shortness of breath or any respiratory difficulty   Respiratory therapy support as indicated     Problem: Cardiovascular - Adult  Goal: Maintains optimal cardiac output and hemodynamic stability  2/10/2023 0039 by Marcus Alvarez RN  Outcome: Progressing  Flowsheets (Taken 2/10/2023 0039)  Maintains optimal cardiac output and hemodynamic stability:   Monitor blood pressure and heart rate   Monitor urine output and notify Licensed Independent Practitioner for values outside of normal range   Assess for signs of decreased cardiac output

## 2023-02-10 NOTE — FLOWSHEET NOTE
02/10/23 1345   Vitals   BP (!) 82/48     Patient with low bp, verbal order with read back from Dr Sang Mcclelland for 500 ml bolus, notified medical resident Dr Emili Cuadra, will continue to monitor.

## 2023-02-10 NOTE — PROGRESS NOTES
Progress Note    Admit Date: 2/7/2023  Day: 3  Diet: Diet NPO    CC: SOB     Interval history:     Overnight patient with Rapid response for increasing O2 req. On examination overnight patient's chest tube noted to be still closed. On opening of Chest tube significant output obtained. Patient''s O2 subsequently returned to 7L. This morning patient is tired, but denies any new complaints. He states that he is breathing about the same as yesterday. Pulm planning to bronch today. HPI:     Patient is a 55 yo M with PMHx of chronic PE on Eliquis, IVDU, Hep C, and COPD (GOLD 2) presenting to ED with about a week of progressively worsening SOB w/ non-productive cough. On day of presentation patient was unable to perform ADLs and decided he needed to come in. Pt denies chest pain, nausea, vomiting or changes in bowel habits. States had has trace leg swelling since PE last year. Has not noticed any acute changes in this. States that he has difficultly breathing when lying on his back and at night. Patient with hx of chronic saddle PE (3/2022). Echo at the time with 60-65% and was unable to determine if there was any pulmonary hypertension. Pt was an IVDU and is not using anymore and is taking methadone and is positive for hep C that has been treated.        Medications:     Scheduled Meds:   piperacillin-tazobactam  3,375 mg IntraVENous Q8H    vancomycin  1,250 mg IntraVENous Q12H    alteplase (ACTIVASE) syringe  10 mg IntraPLEUral BID    And    dornase (PULMOZYME) syringe  5 mg IntraPLEUral BID    enoxaparin  40 mg SubCUTAneous Daily    sodium chloride flush  5-40 mL IntraVENous 2 times per day    valsartan  40 mg Oral 2 times per day    carvedilol  3.125 mg Oral BID WC    potassium chloride  20 mEq Oral BID WC    aspirin  81 mg Oral Daily    methadone  45 mg Oral Daily    folic acid  1 mg Oral Daily     Continuous Infusions:   sodium chloride 25 mL (02/09/23 1501)     PRN Meds:prochlorperazine, sodium chloride flush, sodium chloride, polyethylene glycol, acetaminophen **OR** acetaminophen, potassium chloride **OR** potassium alternative oral replacement **OR** potassium chloride, magnesium sulfate, perflutren lipid microspheres, aluminum & magnesium hydroxide-simethicone    Objective:   Vitals:   T-max:  Patient Vitals for the past 8 hrs:   BP Temp Temp src Pulse Resp SpO2 Weight   02/10/23 0923 -- -- -- 62 -- 97 % --   02/10/23 0900 -- -- -- 62 18 -- --   02/10/23 0802 -- -- -- 65 -- -- --   02/10/23 0758 -- -- -- -- -- 97 % --   02/10/23 0747 99/65 98.1 °F (36.7 °C) Oral 66 16 -- --   02/10/23 0600 -- -- -- 62 20 98 % --   02/10/23 0529 -- -- -- -- -- -- 210 lb 8.6 oz (95.5 kg)   02/10/23 0404 (!) 96/59 98 °F (36.7 °C) Oral 60 21 97 % --   02/10/23 0238 125/80 -- -- 69 26 -- --         Intake/Output Summary (Last 24 hours) at 2/10/2023 1030  Last data filed at 2/10/2023 0900  Gross per 24 hour   Intake 530 ml   Output 1762 ml   Net -1232 ml         Review of Systems   Constitutional:  Positive for activity change and fatigue. Negative for fever. Respiratory:  Positive for cough and shortness of breath. Cardiovascular:  Negative for chest pain. Legs swelling is the same as it usually is    Neurological:  Negative for headaches. Physical Exam  Constitutional:       Appearance: Normal appearance. He is obese. HENT:      Nose: No congestion. Mouth/Throat:      Mouth: Mucous membranes are moist.   Eyes:      Pupils: Pupils are equal, round, and reactive to light. Cardiovascular:      Rate and Rhythm: Normal rate and regular rhythm. Heart sounds: Normal heart sounds. Pulmonary:      Comments: Increased WOB, patient with bilateral sided (lateal and lower lobe) crackles. Occasionally diffuse wheeze on auscultation. None productive cough   Chest:      Chest wall: No tenderness. Abdominal:      General: Bowel sounds are normal.      Palpations: Abdomen is soft. Tenderness:  There is no abdominal tenderness. Musculoskeletal:      Comments: Chest tube in place   Skin:     General: Skin is warm and dry. Neurological:      Mental Status: He is alert. LABS:    CBC:   Recent Labs     02/08/23  0024 02/09/23  0506   WBC 8.4 9.0   HGB 10.9* 10.7*   HCT 33.2* 33.9*  33.7*    174   MCV 99.2 101.2*       Renal:    Recent Labs     02/08/23  0519 02/09/23  0506 02/10/23  0636   * 133* 131*   K 3.6 3.9 3.8   CL 90* 90* 92*   CO2 31 29 30   BUN 11 16 19   CREATININE 1.0 1.1 1.0   GLUCOSE 100* 87 81   CALCIUM 8.6 8.6 8.1*   MG 2.10 2.10 2.00   ANIONGAP 12 14 9       Hepatic:   Recent Labs     02/08/23 0519   PROT 6.8   LABALBU 2.9*       Troponin:   Recent Labs     02/08/23  0024 02/08/23 0519   TROPONINI <0.01 <0.01       BNP: No results for input(s): BNP in the last 72 hours. Lipids:   Recent Labs     02/09/23  0506   CHOL 139   HDL 14*     ABGs:  No results for input(s): PHART, CUJ9QGE, PO2ART, IKU7IJA, BEART, THGBART, J1DMENTX, DBC7OJS in the last 72 hours. INR:   Recent Labs     02/08/23 0519   INR 2.57*       Lactate: No results for input(s): LACTATE in the last 72 hours. Cultures:  -----------------------------------------------------------------  RAD:   XR CHEST PORTABLE   Final Result      1. No change in appearance of bilateral airspace disease. 2.  Right inferior thoracic pigtail catheter likely pleural drain with small to moderate right pleural effusion unchanged. XR CHEST PORTABLE   Final Result   1. As above. XR CHEST PORTABLE   Final Result      1. Stable appearance of the chest with residual right pleural effusion with right chest tube in place and trace associated right basilar pleural gas. 2.  Extensive bilateral airspace disease in the left greater than right lungs. XR CHEST PORTABLE   Final Result      1. Slightly decreased loculated right pleural effusion status post right chest tube placement.  There is a small amount of associated right pleural gas. 2.  Persistent diffuse airspace disease in the left lung and right mid and lower lung airspace disease. CT CHEST PULMONARY EMBOLISM W CONTRAST   Final Result      Linear, eccentric, and left leg filling defects in the right lower lobe segmental and subsegmental pulmonary arteries are suggestive of chronic pulmonary emboli. No definite acute pulmonary emboli identified. Extensive groundglass opacification of both lungs is suspicious for atypical pneumonia although a component of asymmetric pulmonary edema could have a similar appearance. Large complex loculated right pleural fluid collection. Recommend diagnostic thoracentesis, as hemothorax or malignant effusion cannot be excluded. Trace left pleural effusion. INCIDENTAL FINDINGS: None. XR CHEST PORTABLE   Final Result      Extensive bilateral airspace disease, which may represent pneumonia or pulmonary edema. Cardiomegaly. Moderate to large right and questionable small left pleural effusions. XR CHEST PORTABLE    (Results Pending)       Assessment/Plan:     Sher Messina 57 yo M w/ pmhx of chronic PE on Eliquis, IVDU, Hep C, COPD, HTN who presents to the ED with complaints of SOB for the past 6 or 7 days. Pt has had a nonproductive cough as well. Pt denies chest pain, nausea, vomiting, changes in bowel habits. #Acute Hypoxic Respiratory Failure  #Concern for new Heart Failure, ruled out   #Hx COPD   Pt has acute onset shortness of breath that began 6-7 days ago. CT PE shows significant pleural effusions on the right side as well a ground glass opacities. BNP 4462.  6/2022 - patient weight is ~240. Patient this admission is ~225 (down 15 lbs)   Patient unimpressive infectious workup (nl WBC, barely elevated pro alba .19, afebrile). Trop < .01  New O2 requirement (15 HFNC on admission), now on 7L  HF  - Cards consulted  - f/u ECHO: 55%-60% EF, unable to eval diastolic fxn, 41 mmhg PA pressure.    - Monitor strict I/O's  - Daily Weights  - Monitor K+, Mg+  COPD  - Duonebs   - wean O2 as tolerated     #Plural Exudative Effusion   Tap on 2/8 with LD and Protein concerning for exudative effusion   Ddx: Chronic PE vs PNA vs Hemothorax   - Pulm consulted appreciate recs   - Chest tube in to suction, 900 ml out yest, sanguinous appearing. S/p tpa/dornase x1   - plan for bronch today    #PNA, unclear organism  - Pulm following for effusion as above. -Pleural fluid analysis with exudative picture but low glucose. Although output is sanguinous, low glu more indicative of infectious or rheumatoid effusion.  - Start on empiric abx treatment with vanc, zosyn  - Bronch today  - PNA workup sent     #Chronic PE   CT PE does not show a new PE  Previous admission with + hypercoag workup for showing +homocysteine.  .   - ASA  - eliquis held  - Hypercoag labs: B12 nl , folate low, and MMA pending    #Anemia    on 2/9  nl B12, folate <2, MMA pending   - folate replete    #Hx of IVDU  Pt states that he has not used in a long time and is decreasing the dose of methadone  - 45mg Methadone     #HTN  - home coreg  - home valsartan     Code Status:Full code  FEN: Regular  PPX: Eliquis held  DISPO: 1600 Chaz Venegas MD  02/10/23  10:30 AM      This patient has been staffed and discussed with Melissa Colorado MD.

## 2023-02-10 NOTE — FLOWSHEET NOTE
Remains with crackles and rhonchi bilaterally. Good cough effort and tolerating weaning to hi marcela from 12 down to 8 liters. States that breathing is \"pretty much what it always feels like\".

## 2023-02-10 NOTE — CODE DOCUMENTATION
Pt had SOB , desat to 70s on 10L high flow NC , pt placed on NRB at 15 L HFNC , satting at 100%. CT placed at (-) 20 suction per verbal orders , stat portable chest xray ordered. @ 0020H , pt had a total of 192 mls output since CT was put to suction , now at 100% on 15 L HFNC.
Patent

## 2023-02-10 NOTE — PLAN OF CARE
Problem: Discharge Planning  Goal: Discharge to home or other facility with appropriate resources  Outcome: Progressing  Flowsheets (Taken 2/10/2023 0900)  Discharge to home or other facility with appropriate resources: Identify barriers to discharge with patient and caregiver     Problem: Safety - Adult  Goal: Free from fall injury  Outcome: Progressing     Problem: ABCDS Injury Assessment  Goal: Absence of physical injury  Outcome: Progressing     Problem: Respiratory - Adult  Goal: Achieves optimal ventilation and oxygenation  Outcome: Progressing  Flowsheets (Taken 2/10/2023 0900)  Achieves optimal ventilation and oxygenation:   Assess for changes in respiratory status   Assess for changes in mentation and behavior   Position to facilitate oxygenation and minimize respiratory effort   Oxygen supplementation based on oxygen saturation or arterial blood gases     Problem: Cardiovascular - Adult  Goal: Maintains optimal cardiac output and hemodynamic stability  Outcome: Progressing  Flowsheets (Taken 2/10/2023 0900)  Maintains optimal cardiac output and hemodynamic stability:   Monitor blood pressure and heart rate   Monitor urine output and notify Licensed Independent Practitioner for values outside of normal range   Assess for signs of decreased cardiac output     Problem: Metabolic/Fluid and Electrolytes - Adult  Goal: Electrolytes maintained within normal limits  Outcome: Progressing  Flowsheets (Taken 2/10/2023 0900)  Electrolytes maintained within normal limits:   Monitor labs and assess patient for signs and symptoms of electrolyte imbalances   Administer electrolyte replacement as ordered   Monitor response to electrolyte replacements, including repeat lab results as appropriate     Problem: Metabolic/Fluid and Electrolytes - Adult  Goal: Electrolytes maintained within normal limits  Outcome: Progressing  Flowsheets (Taken 2/10/2023 0900)  Electrolytes maintained within normal limits:   Monitor labs and assess patient for signs and symptoms of electrolyte imbalances   Administer electrolyte replacement as ordered   Monitor response to electrolyte replacements, including repeat lab results as appropriate

## 2023-02-10 NOTE — PROCEDURES
PROCEDURE:  BRONCHOSCOPY WITH BRONCHOALVEOLAR LAVAGE    Diagnosis: acute hypoxemic respiratory failure, pneumonia    ASA CLASS      IV. Severe Systemic Disease which is a threat to life. Mallampati score:  3    Sedation plan:     Type of sedation used: General anesthesia       Post Procedure Plan   Return to same level of care     The risks and benefits as well as alternatives to the procedure have been discussed with the patient and or family. The patient and or next of kin understands and agrees to proceed. DESCRIPTION OF PROCEDURE: A time out was taken. The scope was passed with ease via the ETT. A complete airway inspection was performed. No endobronchial lesions were identified. There were no significant secretions. A Bronchoalveolar lavage was obtained from the GABRIELA lobe with good return. Following BAL I performed 4 transbronchial forceps biopsies in each of the LLL and FLOWER using fluoroscopic guidance. The patient tolerated the procedure well. Recovery will be per endoscopy protocol. Estimated blood loss:  10-15 mL    FOLLOW UP:  Cultures, diatherix, cytology and pathology in three to five days.       Vasquez Daugherty MD

## 2023-02-10 NOTE — PROGRESS NOTES
Verbal order with read back from Dr Juani Jones to hold scheduled Lovenox, ok to take morning medications with sips of water. Will continue to monitor.

## 2023-02-11 ENCOUNTER — APPOINTMENT (OUTPATIENT)
Dept: GENERAL RADIOLOGY | Age: 48
DRG: 166 | End: 2023-02-11
Payer: MEDICAID

## 2023-02-11 PROBLEM — R09.02 HYPOXEMIA: Status: ACTIVE | Noted: 2023-02-11

## 2023-02-11 LAB
ANION GAP SERPL CALCULATED.3IONS-SCNC: 6 MMOL/L (ref 3–16)
BASOPHILS ABSOLUTE: 0 K/UL (ref 0–0.2)
BASOPHILS RELATIVE PERCENT: 0.2 %
BUN BLDV-MCNC: 16 MG/DL (ref 7–20)
CALCIUM SERPL-MCNC: 8.4 MG/DL (ref 8.3–10.6)
CHLORIDE BLD-SCNC: 95 MMOL/L (ref 99–110)
CO2: 33 MMOL/L (ref 21–32)
CREAT SERPL-MCNC: 1 MG/DL (ref 0.9–1.3)
EOSINOPHILS ABSOLUTE: 0.1 K/UL (ref 0–0.6)
EOSINOPHILS RELATIVE PERCENT: 0.8 %
GFR SERPL CREATININE-BSD FRML MDRD: >60 ML/MIN/{1.73_M2}
GLUCOSE BLD-MCNC: 90 MG/DL (ref 70–99)
HCT VFR BLD CALC: 26.3 % (ref 40.5–52.5)
HEMOGLOBIN: 8.3 G/DL (ref 13.5–17.5)
L. PNEUMOPHILA SEROGP 1 UR AG: NORMAL
LACTIC ACID: 1.2 MMOL/L (ref 0.4–2)
LYMPHOCYTES ABSOLUTE: 1.1 K/UL (ref 1–5.1)
LYMPHOCYTES RELATIVE PERCENT: 10.8 %
MAGNESIUM: 2.1 MG/DL (ref 1.8–2.4)
MCH RBC QN AUTO: 31.7 PG (ref 26–34)
MCHC RBC AUTO-ENTMCNC: 31.6 G/DL (ref 31–36)
MCV RBC AUTO: 100.4 FL (ref 80–100)
MONOCYTES ABSOLUTE: 0.9 K/UL (ref 0–1.3)
MONOCYTES RELATIVE PERCENT: 8.6 %
NEUTROPHILS ABSOLUTE: 7.9 K/UL (ref 1.7–7.7)
NEUTROPHILS RELATIVE PERCENT: 79.6 %
PDW BLD-RTO: 18.4 % (ref 12.4–15.4)
PLATELET # BLD: 177 K/UL (ref 135–450)
PMV BLD AUTO: 10.8 FL (ref 5–10.5)
PNEUMONIA PANEL MOLECULAR: NORMAL
POTASSIUM SERPL-SCNC: 4 MMOL/L (ref 3.5–5.1)
RBC # BLD: 2.62 M/UL (ref 4.2–5.9)
REPORT: NORMAL
SODIUM BLD-SCNC: 134 MMOL/L (ref 136–145)
STREP PNEUMONIAE ANTIGEN, URINE: NORMAL
WBC # BLD: 9.9 K/UL (ref 4–11)

## 2023-02-11 PROCEDURE — 6370000000 HC RX 637 (ALT 250 FOR IP): Performed by: STUDENT IN AN ORGANIZED HEALTH CARE EDUCATION/TRAINING PROGRAM

## 2023-02-11 PROCEDURE — 71045 X-RAY EXAM CHEST 1 VIEW: CPT

## 2023-02-11 PROCEDURE — 6360000002 HC RX W HCPCS: Performed by: INTERNAL MEDICINE

## 2023-02-11 PROCEDURE — 94761 N-INVAS EAR/PLS OXIMETRY MLT: CPT

## 2023-02-11 PROCEDURE — 36415 COLL VENOUS BLD VENIPUNCTURE: CPT

## 2023-02-11 PROCEDURE — 99233 SBSQ HOSP IP/OBS HIGH 50: CPT | Performed by: INTERNAL MEDICINE

## 2023-02-11 PROCEDURE — 2580000003 HC RX 258

## 2023-02-11 PROCEDURE — 2700000000 HC OXYGEN THERAPY PER DAY

## 2023-02-11 PROCEDURE — 51798 US URINE CAPACITY MEASURE: CPT

## 2023-02-11 PROCEDURE — 2060000000 HC ICU INTERMEDIATE R&B

## 2023-02-11 PROCEDURE — 83605 ASSAY OF LACTIC ACID: CPT

## 2023-02-11 PROCEDURE — 6370000000 HC RX 637 (ALT 250 FOR IP): Performed by: INTERNAL MEDICINE

## 2023-02-11 PROCEDURE — 80048 BASIC METABOLIC PNL TOTAL CA: CPT

## 2023-02-11 PROCEDURE — 83735 ASSAY OF MAGNESIUM: CPT

## 2023-02-11 PROCEDURE — 2580000003 HC RX 258: Performed by: INTERNAL MEDICINE

## 2023-02-11 PROCEDURE — 85025 COMPLETE CBC W/AUTO DIFF WBC: CPT

## 2023-02-11 RX ORDER — SODIUM CHLORIDE 9 MG/ML
INJECTION, SOLUTION INTRAVENOUS CONTINUOUS
Status: ACTIVE | OUTPATIENT
Start: 2023-02-11 | End: 2023-02-12

## 2023-02-11 RX ORDER — SODIUM CHLORIDE, SODIUM LACTATE, POTASSIUM CHLORIDE, AND CALCIUM CHLORIDE .6; .31; .03; .02 G/100ML; G/100ML; G/100ML; G/100ML
500 INJECTION, SOLUTION INTRAVENOUS ONCE
Status: COMPLETED | OUTPATIENT
Start: 2023-02-11 | End: 2023-02-11

## 2023-02-11 RX ORDER — SODIUM CHLORIDE, SODIUM LACTATE, POTASSIUM CHLORIDE, CALCIUM CHLORIDE 600; 310; 30; 20 MG/100ML; MG/100ML; MG/100ML; MG/100ML
INJECTION, SOLUTION INTRAVENOUS CONTINUOUS
Status: DISPENSED | OUTPATIENT
Start: 2023-02-11 | End: 2023-02-11

## 2023-02-11 RX ADMIN — Medication 45 MG: at 13:44

## 2023-02-11 RX ADMIN — PIPERACILLIN AND TAZOBACTAM 3375 MG: 3; .375 INJECTION, POWDER, LYOPHILIZED, FOR SOLUTION INTRAVENOUS at 16:06

## 2023-02-11 RX ADMIN — PIPERACILLIN AND TAZOBACTAM 3375 MG: 3; .375 INJECTION, POWDER, LYOPHILIZED, FOR SOLUTION INTRAVENOUS at 00:01

## 2023-02-11 RX ADMIN — FOLIC ACID 1 MG: 1 TABLET ORAL at 08:51

## 2023-02-11 RX ADMIN — SODIUM CHLORIDE, POTASSIUM CHLORIDE, SODIUM LACTATE AND CALCIUM CHLORIDE: 600; 310; 30; 20 INJECTION, SOLUTION INTRAVENOUS at 08:54

## 2023-02-11 RX ADMIN — POTASSIUM CHLORIDE 20 MEQ: 20 TABLET, EXTENDED RELEASE ORAL at 08:51

## 2023-02-11 RX ADMIN — POTASSIUM CHLORIDE 20 MEQ: 20 TABLET, EXTENDED RELEASE ORAL at 16:05

## 2023-02-11 RX ADMIN — ASPIRIN 81 MG 81 MG: 81 TABLET ORAL at 08:51

## 2023-02-11 RX ADMIN — SODIUM CHLORIDE: 9 INJECTION, SOLUTION INTRAVENOUS at 17:46

## 2023-02-11 RX ADMIN — VANCOMYCIN HYDROCHLORIDE 1250 MG: 10 INJECTION, POWDER, LYOPHILIZED, FOR SOLUTION INTRAVENOUS at 13:48

## 2023-02-11 RX ADMIN — VANCOMYCIN HYDROCHLORIDE 1250 MG: 10 INJECTION, POWDER, LYOPHILIZED, FOR SOLUTION INTRAVENOUS at 03:00

## 2023-02-11 RX ADMIN — PIPERACILLIN AND TAZOBACTAM 3375 MG: 3; .375 INJECTION, POWDER, LYOPHILIZED, FOR SOLUTION INTRAVENOUS at 08:57

## 2023-02-11 RX ADMIN — SODIUM CHLORIDE, POTASSIUM CHLORIDE, SODIUM LACTATE AND CALCIUM CHLORIDE 500 ML: 600; 310; 30; 20 INJECTION, SOLUTION INTRAVENOUS at 02:16

## 2023-02-11 RX ADMIN — ENOXAPARIN SODIUM 40 MG: 100 INJECTION SUBCUTANEOUS at 08:51

## 2023-02-11 ASSESSMENT — PAIN SCALES - GENERAL
PAINLEVEL_OUTOF10: 0

## 2023-02-11 ASSESSMENT — ENCOUNTER SYMPTOMS
SHORTNESS OF BREATH: 1
COUGH: 1

## 2023-02-11 ASSESSMENT — PAIN SCALES - WONG BAKER
WONGBAKER_NUMERICALRESPONSE: 0
WONGBAKER_NUMERICALRESPONSE: 0

## 2023-02-11 NOTE — PLAN OF CARE
Problem: Discharge Planning  Goal: Discharge to home or other facility with appropriate resources  Outcome: Progressing  Flowsheets (Taken 2/11/2023 0800)  Discharge to home or other facility with appropriate resources: Identify barriers to discharge with patient and caregiver     Problem: Safety - Adult  Goal: Free from fall injury  2/11/2023 0950 by Ct Mejía RN  Outcome: Progressing    Problem: ABCDS Injury Assessment  Goal: Absence of physical injury  Outcome: Progressing     Problem: Respiratory - Adult  Goal: Achieves optimal ventilation and oxygenation  2/11/2023 0950 by Ct Mejía RN  Outcome: Progressing    Problem: Cardiovascular - Adult  Goal: Maintains optimal cardiac output and hemodynamic stability  2/11/2023 0950 by Ct Mejía RN  Outcome: Progressing  Flowsheets (Taken 2/11/2023 0800)  Maintains optimal cardiac output and hemodynamic stability: Monitor blood pressure and heart rate  2  Problem: Metabolic/Fluid and Electrolytes - Adult  Goal: Electrolytes maintained within normal limits  Outcome: Progressing  Flowsheets (Taken 2/11/2023 0800)  Electrolytes maintained within normal limits:   Monitor labs and assess patient for signs and symptoms of electrolyte imbalances   Administer electrolyte replacement as ordered   Monitor response to electrolyte replacements, including repeat lab results as appropriate     Problem: Pain  Goal: Verbalizes/displays adequate comfort level or baseline comfort level  Outcome: Progressing

## 2023-02-11 NOTE — PROGRESS NOTES
CC: Pneumonitis    No new complaints. Denies right chest pain. Dyspnea about the same as it has been, not particularly bothering him at rest.  He feels tired today. No cough. Afebrile  WBC 9.9. Platelets 014  Continuing empiric antibiotic therapy with vancomycin and piperacillin-tazobactam.  Cultures pending. /50. NSR. S1, S2 normal.  O2 saturation 98%, O2 at 5 L/min  Diffuse inspiratory crackles bilaterally. Breath sounds diminished right lateral chest.  Right-sided chest tube draining sanguinous fluid, output has been 110 mL past 24 hours. Has not received lytic therapy. Chest x-ray today shows no change in loculated right pleural effusion. Diffuse alveolar opacities bilaterally not significantly changed. Abdomen obese, no acute findings  No peripheral edema. Fluid balance mildly negative. Serum creatinine stable 1.0. Electrolytes unchanged, bicarbonate mildly increased, 33. Assessment and plan: Right hemothorax, patient taking variable amounts of DOAC. Suspected trauma but unknown. Draining sanguinous fluid adequately. No plan to repeat lytic therapy. Diffuse alveolar opacities of uncertain etiology. Not particularly consistent with bacterial pneumonia, but may consider atypical infections, hypersensitivity reaction, toxic reaction, pulmonary edema. Echocardiogram normal, but proBNP elevated. Currently not on diuretic, but receiving broad-spectrum antibiotic therapy. Awaiting BAL and transbronchial biopsy results.   Follow-up proBNP

## 2023-02-11 NOTE — PLAN OF CARE
Problem: Safety - Adult  Goal: Free from fall injury  2/11/2023 0259 by Willie Monaco RN  Outcome: Progressing  Flowsheets (Taken 2/11/2023 0259)  Free From Fall Injury: Instruct family/caregiver on patient safety  Note: Fall protocol in place      Problem: Respiratory - Adult  Goal: Achieves optimal ventilation and oxygenation  2/11/2023 0259 by Willie Monaco RN  Outcome: Progressing  Flowsheets (Taken 2/11/2023 0259)  Achieves optimal ventilation and oxygenation:   Assess for changes in respiratory status   Assess for changes in mentation and behavior   Position to facilitate oxygenation and minimize respiratory effort   Oxygen supplementation based on oxygen saturation or arterial blood gases   Encourage broncho-pulmonary hygiene including cough, deep breathe, incentive spirometry   Assess the need for suctioning and aspirate as needed   Assess and instruct to report shortness of breath or any respiratory difficulty   Respiratory therapy support as indicated  Note: On chest tube , tubing placement checked , observing for any sign of crepitus around site , leaking indication observed , om -20 cmh2o suction      Problem: Cardiovascular - Adult  Goal: Maintains optimal cardiac output and hemodynamic stability  2/11/2023 0259 by Willie Monaco RN  Outcome: Progressing  Flowsheets (Taken 2/11/2023 0259)  Maintains optimal cardiac output and hemodynamic stability:   Monitor urine output and notify Licensed Independent Practitioner for values outside of normal range   Monitor blood pressure and heart rate   Assess for signs of decreased cardiac output   Administer fluid and/or volume expanders as ordered   Administer vasoactive medications as ordered

## 2023-02-11 NOTE — PROGRESS NOTES
Progress Note    Admit Date: 2/7/2023  Day: 4  Diet: ADULT DIET; Regular; Low Sodium (2 gm)    CC: SOB     Interval history:     Patient with continued soft BP yesterday. Not tachycardic. Fluid boluses given with response in BP. Patient on 5L O2 overnight. Chest tube 100ml out    Repeat CXR stable    Patient states that he is a little tired and hasnt been drinking/eating as much. He otherwise states he has been breathing about the same. HPI:     Patient is a 55 yo M with PMHx of chronic PE on Eliquis, IVDU, Hep C, and COPD (GOLD 2) presenting to ED with about a week of progressively worsening SOB w/ non-productive cough. On day of presentation patient was unable to perform ADLs and decided he needed to come in. Pt denies chest pain, nausea, vomiting or changes in bowel habits. States had has trace leg swelling since PE last year. Has not noticed any acute changes in this. States that he has difficultly breathing when lying on his back and at night. Patient with hx of chronic saddle PE (3/2022). Echo at the time with 60-65% and was unable to determine if there was any pulmonary hypertension. Pt was an IVDU and is not using anymore and is taking methadone and is positive for hep C that has been treated.        Medications:     Scheduled Meds:   piperacillin-tazobactam  3,375 mg IntraVENous Q8H    vancomycin  1,250 mg IntraVENous Q12H    enoxaparin  40 mg SubCUTAneous Daily    sodium chloride flush  5-40 mL IntraVENous 2 times per day    [Held by provider] valsartan  40 mg Oral 2 times per day    [Held by provider] carvedilol  3.125 mg Oral BID WC    potassium chloride  20 mEq Oral BID WC    aspirin  81 mg Oral Daily    methadone  45 mg Oral Daily    folic acid  1 mg Oral Daily     Continuous Infusions:   lactated ringers IV soln 250 mL/hr at 02/11/23 0854    sodium chloride 10 mL (02/10/23 1658)     PRN Meds:prochlorperazine, sodium chloride flush, sodium chloride, polyethylene glycol, acetaminophen **OR** acetaminophen, potassium chloride **OR** potassium alternative oral replacement **OR** potassium chloride, magnesium sulfate, perflutren lipid microspheres, aluminum & magnesium hydroxide-simethicone    Objective:   Vitals:   T-max:  Patient Vitals for the past 8 hrs:   BP Temp Temp src Pulse Resp SpO2 Weight   02/11/23 0418 -- -- -- -- -- -- 225 lb 15.5 oz (102.5 kg)   02/11/23 0410 94/60 98.2 °F (36.8 °C) Oral 57 17 96 % --   02/11/23 0223 92/64 -- -- -- -- -- --   02/11/23 0134 (!) 86/56 -- -- -- -- -- --       Intake/Output Summary (Last 24 hours) at 2/11/2023 0916  Last data filed at 2/11/2023 0540  Gross per 24 hour   Intake 910 ml   Output 722 ml   Net 188 ml       Review of Systems   Constitutional:  Positive for activity change and fatigue. Negative for fever.   Respiratory:  Positive for cough and shortness of breath.    Cardiovascular:  Negative for chest pain.        Legs swelling is the same as it usually is    Neurological:  Negative for headaches.     Physical Exam  Constitutional:       Appearance: Normal appearance. He is obese.   HENT:      Nose: No congestion.      Mouth/Throat:      Mouth: Mucous membranes are moist.   Eyes:      Pupils: Pupils are equal, round, and reactive to light.   Cardiovascular:      Rate and Rhythm: Normal rate and regular rhythm.      Heart sounds: Normal heart sounds.   Pulmonary:      Comments: Increased WOB, patient with bilateral sided (lateal and lower lobe) crackles. Occasionally diffuse wheeze on auscultation. None productive cough   Chest:      Chest wall: No tenderness.   Abdominal:      General: Bowel sounds are normal.      Palpations: Abdomen is soft.      Tenderness: There is no abdominal tenderness.   Musculoskeletal:      Comments: Chest tube in place   Skin:     General: Skin is warm and dry.   Neurological:      Mental Status: He is alert.       LABS:    CBC:   Recent Labs     02/09/23  0506 02/11/23  0600   WBC 9.0 9.9   HGB 10.7* 8.3*   HCT  33.9*  33.7* 26.3*    177   .2* 100.4*     Renal:    Recent Labs     02/09/23  0506 02/10/23  0636 02/11/23  0630   * 131* 134*   K 3.9 3.8 4.0   CL 90* 92* 95*   CO2 29 30 33*   BUN 16 19 16   CREATININE 1.1 1.0 1.0   GLUCOSE 87 81 90   CALCIUM 8.6 8.1* 8.4   MG 2.10 2.00 2.10   ANIONGAP 14 9 6     Hepatic: No results for input(s): AST, ALT, BILITOT, BILIDIR, PROT, LABALBU, ALKPHOS in the last 72 hours. Troponin:   No results for input(s): TROPONINI in the last 72 hours. BNP: No results for input(s): BNP in the last 72 hours. Lipids:   Recent Labs     02/09/23  0506   CHOL 139   HDL 14*     ABGs:  No results for input(s): PHART, DFW9EBC, PO2ART, UUQ0ZKL, BEART, THGBART, I3ZLDLNP, CTA7ZZM in the last 72 hours. INR: No results for input(s): INR in the last 72 hours. Lactate: No results for input(s): LACTATE in the last 72 hours. Cultures:  -----------------------------------------------------------------  RAD:   XR CHEST PORTABLE   Final Result   1. Persistent right effusion and right lower lung airspace disease without change from prior. 2. Extensive left-sided airspace disease stable to prior. FLUORO FOR SURGICAL PROCEDURES   Final Result      1. Intraprocedure fluoroscopy was provided. XR CHEST 1 VIEW   Final Result      1. Intraprocedure fluoroscopy was provided. XR CHEST PORTABLE   Final Result      1. No change in appearance of bilateral airspace disease. 2.  Right inferior thoracic pigtail catheter likely pleural drain with small to moderate right pleural effusion unchanged. XR CHEST PORTABLE   Final Result   1. As above. XR CHEST PORTABLE   Final Result      1. Stable appearance of the chest with residual right pleural effusion with right chest tube in place and trace associated right basilar pleural gas. 2.  Extensive bilateral airspace disease in the left greater than right lungs. XR CHEST PORTABLE   Final Result      1.   Slightly decreased loculated right pleural effusion status post right chest tube placement. There is a small amount of associated right pleural gas. 2.  Persistent diffuse airspace disease in the left lung and right mid and lower lung airspace disease. CT CHEST PULMONARY EMBOLISM W CONTRAST   Final Result      Linear, eccentric, and left leg filling defects in the right lower lobe segmental and subsegmental pulmonary arteries are suggestive of chronic pulmonary emboli. No definite acute pulmonary emboli identified. Extensive groundglass opacification of both lungs is suspicious for atypical pneumonia although a component of asymmetric pulmonary edema could have a similar appearance. Large complex loculated right pleural fluid collection. Recommend diagnostic thoracentesis, as hemothorax or malignant effusion cannot be excluded. Trace left pleural effusion. INCIDENTAL FINDINGS: None. XR CHEST PORTABLE   Final Result      Extensive bilateral airspace disease, which may represent pneumonia or pulmonary edema. Cardiomegaly. Moderate to large right and questionable small left pleural effusions. XR CHEST PORTABLE    (Results Pending)       Assessment/Plan:     Berta Brennand 57 yo M w/ pmhx of chronic PE on Eliquis, IVDU, Hep C, COPD, HTN who presents to the ED with complaints of SOB for the past 6 or 7 days. Pt has had a nonproductive cough as well. Pt denies chest pain, nausea, vomiting, changes in bowel habits. #Acute Hypoxic Respiratory Failure  #Concern for new Heart Failure, ruled out   #Hx COPD   Pt has acute onset shortness of breath that began 6-7 days ago. CT PE shows significant pleural effusions on the right side as well a ground glass opacities. BNP 4462.  6/2022 - patient weight is ~240. Patient this admission is ~225 (down 15 lbs)   Patient unimpressive infectious workup (nl WBC, barely elevated pro alba .19, afebrile).    Trop < .01  New O2 requirement (15 HFNC on admission), now on 7L  HF  - Cards consulted  - f/u ECHO: 55%-60% EF, unable to eval diastolic fxn, 41 mmhg PA pressure. - Monitor strict I/O's  - Daily Weights  - Monitor K+, Mg+  COPD  - Duonebs   - wean O2 as tolerated     #Plural Exudative Effusion, suspect empyema   Tap on 2/8 with LD and Protein concerning for exudative effusion   Ddx: Chronic PE vs PNA vs Hemothorax   - Pulm consulted appreciate recs   - Chest tube in to suction, 100 ml out yest, sanguinous appearing. S/p tpa/dornase x1  - Pleural fluid analysis with exudative picture but low glucose. Although output is sanguinous, low glu more indicative of infectious or rheumatoid effusion.   - bronch with no acute findings    #PNA, unclear organism  - Pulm following for effusion as above. -Pleural fluid analysis with exudative picture but low glucose. Although output is sanguinous, low glu more indicative of infectious or rheumatoid effusion.  - Cont vanc, zosyn  - Bronch with no lesions, samples taken for culture. - PNA workup sent     #Chronic PE   CT PE does not show a new PE  Previous admission with + hypercoag workup for showing +homocysteine. .   - ASA  - eliquis held  - Hypercoag labs: B12 nl , folate low, replete    #Acute Anemia    on 2/9  nl B12, folate <2  - folate replete  - Patient also with significant bloody output via Chest tube in past 2 days. - output slowing down today. - If acutely hypotensive or bleeding will check [facility]     #Hx of IVDU  Pt states that he has not used in a long time and is decreasing the dose of methadone  - 45mg Methadone     #HTN  - home coreg, holding today  - home valsartan.  Holding today     Code Status:Full code  FEN: Regular  PPX: Eliquis held  DISPO: Maura Walls MD  02/11/23  9:16 AM      This patient has been staffed and discussed with Joseph Gasca MD.

## 2023-02-11 NOTE — PROGRESS NOTES
[address] Daily Progress Note      Admit Date:  2/7/2023    Subjective:  Mr. All Tse was seen and examined. F/U CHF. Breathing about same. No chest pain.      Objective:   BP 94/60   Pulse 57   Temp 98.2 °F (36.8 °C) (Oral)   Resp 17   Ht 5' 10\" (1.778 m)   Wt 225 lb 15.5 oz (102.5 kg)   SpO2 96%   BMI 32.42 kg/m²     Intake/Output Summary (Last 24 hours) at 2/11/2023 0720  Last data filed at 2/11/2023 0540  Gross per 24 hour   Intake 910 ml   Output 722 ml   Net 188 ml       TELEMETRY: Sinus     Physical Exam:  General:  Awake, alert, NAD  Skin:  Warm and dry  Neck:  JVP difficult  Chest:  decreased BS,crackles, respiration normal at rest.  Cardiovascular:  RRR S1S2  Abdomen:  Soft nontender  Extremities:  no edema    Medications:    piperacillin-tazobactam  3,375 mg IntraVENous Q8H    vancomycin  1,250 mg IntraVENous Q12H    enoxaparin  40 mg SubCUTAneous Daily    sodium chloride flush  5-40 mL IntraVENous 2 times per day    valsartan  40 mg Oral 2 times per day    [Held by provider] carvedilol  3.125 mg Oral BID WC    potassium chloride  20 mEq Oral BID WC    aspirin  81 mg Oral Daily    methadone  45 mg Oral Daily    folic acid  1 mg Oral Daily      sodium chloride 10 mL (02/10/23 1658)     prochlorperazine, sodium chloride flush, sodium chloride, polyethylene glycol, acetaminophen **OR** acetaminophen, potassium chloride **OR** potassium alternative oral replacement **OR** potassium chloride, magnesium sulfate, perflutren lipid microspheres, aluminum & magnesium hydroxide-simethicone    Lab Data:  CBC:   Recent Labs     02/09/23  0506   WBC 9.0   HGB 10.7*   HCT 33.9*  33.7*   .2*        BMP:   Recent Labs     02/09/23  0506 02/10/23  0636 02/11/23  0630   * 131* 134*   K 3.9 3.8 4.0   CL 90* 92* 95*   CO2 29 30 33*   BUN 16 19 16   CREATININE 1.1 1.0 1.0     LIVER PROFILE: No results for input(s): AST, ALT, LIPASE, BILIDIR, BILITOT, ALKPHOS in the last 72 hours.    Invalid input(s): AMYLASE,  ALB  PT/INR:   No results for input(s): PROTIME, INR in the last 72 hours. APTT: No results for input(s): APTT in the last 72 hours. BNP:  No results for input(s): BNP in the last 72 hours. IMAGING:     Assessment:  Patient Active Problem List    Diagnosis Date Noted    Acute heart failure, unspecified heart failure type ([facility] [address] 02/08/2023    Normocytic anemia 02/08/2023    Chronic anticoagulation 02/08/2023    HTN (hypertension) 02/08/2023    Opioid dependence on agonist therapy ([facility] [address] 02/08/2023    Hx pulmonary embolism 02/08/2023    Acute pulmonary edema ([facility] [address] 02/08/2023    Hemothorax on right 02/08/2023    Pleural effusion 02/08/2023       Plan:  Breathing unchanged. Still on high flow O2. On Coreg/diovan. Cr stable. CT in place. Output decreasing. Cytology pending. Pulmonary following.        Core Measures:  Discharge instructions:   LVEF documented:   ACEI for LV dysfunction:   Smoking Cessation:    Gil Fu MD, MD 2/11/2023 7:20 AM

## 2023-02-11 NOTE — FLOWSHEET NOTE
02/11/23 0830   Vitals   BP (!) 86/47     Patient asymptomatic, denies chest pain, sob, diaphoresis, Dr Fredi Gutierrez bedside, received new orders, will continue to monitor.

## 2023-02-11 NOTE — PROGRESS NOTES
Clinical Pharmacy Progress Note    Vancomycin - Management by Pharmacy    Consult Date(s): 2/9/23  Consulting Provider(s): Dr. Luevano Sites / Plan  PNA, Loculated pleural effusion - Vancomycin  Concurrent Antimicrobials:   Zosyn - Day #3  Day of Vanc Therapy / Ordered Duration: #3 of 7  Current Dosing Method: Bayesian-Guided AUC Dosing  Therapeutic Goal: -600 mg/L*hr  Current Dose / Plan:   Currently on 1250mg IV q12h. Calculated AUC is 563 mg/L*h with steady-state trough of 16.9 mg/L based on level drawn 2/10. SCr stable - continue same. Plan to repeat level in ~2-3 days, or sooner if clinically indicated. Will continue to monitor clinical condition and make adjustments to regimen as appropriate. Please call with questions--  Thanks--  Rogelio Roberts, PharmD, BCPS, BCGP  [id number] ([facility] [place])   2/11/2023 12:43 PM      Interval update:  Pt is s/p bronchoscopy (done yesterday) with no acute findings per notes. BAL culture in process. Subjective/Objective:   Edith Lao is a 56 y.o. male with a PMHx significant for chronic PE (on Apxiaban), IVDU, hepatitis C, COPD, HTN who presented to ED with SOB x 6-7 days. Admitted 2/7 with new onset heart failure. Pulmonology consulted this admission for large R pleural fluid collection - s/p chest tube placement on 2/8. Started on broad spectrum ABx on 2/9. Pulmonology instilled TPA/dornase in chest tube on 2/9. Pharmacy is consulted to dose Vancomycin.     Ht Readings from Last 1 Encounters:   02/08/23 5' 10\" (1.778 m)     Wt Readings from Last 1 Encounters:   02/11/23 225 lb 15.5 oz (102.5 kg)     Current & Prior Antimicrobial Regimen(s):  Zosyn (2/9-current)  Vancomycin - Pharmacy to dose  1250mg IV q12h (2/9-current)    Vancomycin Level(s) / Doses:    Date Time Dose Type of Level / Level Interpretation   2/10 0636 1250mg IV q12h Random = 20.9 mg/L Level drawn ~4h after prior dose  Calculated AUC = 563 mg/L*h with steady-state trough of 16.9 mg/L  Continue same dose   Note: Serum levels collected for AUC-based dosing may be high if collected in close proximity to the dose administered. This is not necessarily indicative of toxicity. Cultures & Sensitivities:    Date Site Micro Susceptibility / Result   2/8 COVID-19 + influenza, Rapid Not detected    2/10 Pleural fluid sent    2/10 MRSA nasal PCR ordered    2/10 Strep pneumo antigen Negative    2/10 Legionella antigen Negative    2/10 BAL In process      Recent Labs     02/09/23  0506 02/10/23  0636 02/11/23  0600 02/11/23  0630   CREATININE 1.1 1.0  --  1.0   BUN 16 19  --  16   WBC 9.0  --  9.9  --        Estimated Creatinine Clearance: 111 mL/min (based on SCr of 1 mg/dL).     Additional Lab Values / Findings of Note:    Recent Labs     02/09/23  0506   PROCAL 0.15

## 2023-02-11 NOTE — PROGRESS NOTES
Patient declined to get out of bed at this time, educated patient on the importance of getting out of bed and ambulating, will continue to monitor.

## 2023-02-11 NOTE — PROGRESS NOTES
Patient bladder scanned for 219 ml, last recorded void at 0530, Notified Dr Shalonda Christopher via secure chat. Received new orders, will continue monitor.

## 2023-02-12 ENCOUNTER — APPOINTMENT (OUTPATIENT)
Dept: GENERAL RADIOLOGY | Age: 48
DRG: 166 | End: 2023-02-12
Payer: MEDICAID

## 2023-02-12 LAB
ANION GAP SERPL CALCULATED.3IONS-SCNC: 5 MMOL/L (ref 3–16)
BASOPHILS ABSOLUTE: 0 K/UL (ref 0–0.2)
BASOPHILS RELATIVE PERCENT: 0 %
BUN BLDV-MCNC: 16 MG/DL (ref 7–20)
CALCIUM SERPL-MCNC: 7.9 MG/DL (ref 8.3–10.6)
CHLORIDE BLD-SCNC: 98 MMOL/L (ref 99–110)
CO2: 30 MMOL/L (ref 21–32)
CREAT SERPL-MCNC: 1.7 MG/DL (ref 0.9–1.3)
CULTURE, RESPIRATORY: NORMAL
EOSINOPHILS ABSOLUTE: 0.2 K/UL (ref 0–0.6)
EOSINOPHILS RELATIVE PERCENT: 2 %
GFR SERPL CREATININE-BSD FRML MDRD: 50 ML/MIN/{1.73_M2}
GLUCOSE BLD-MCNC: 82 MG/DL (ref 70–99)
GRAM STAIN RESULT: NORMAL
HCT VFR BLD CALC: 24 % (ref 40.5–52.5)
HEMOGLOBIN: 7.7 G/DL (ref 13.5–17.5)
LYMPHOCYTES ABSOLUTE: 0.5 K/UL (ref 1–5.1)
LYMPHOCYTES RELATIVE PERCENT: 7 %
MAGNESIUM: 1.9 MG/DL (ref 1.8–2.4)
MCH RBC QN AUTO: 32.5 PG (ref 26–34)
MCHC RBC AUTO-ENTMCNC: 32 G/DL (ref 31–36)
MCV RBC AUTO: 101.4 FL (ref 80–100)
MONOCYTES ABSOLUTE: 1.1 K/UL (ref 0–1.3)
MONOCYTES RELATIVE PERCENT: 14 %
MYELOCYTE PERCENT: 1 %
NEUTROPHILS ABSOLUTE: 5.9 K/UL (ref 1.7–7.7)
NEUTROPHILS RELATIVE PERCENT: 76 %
PDW BLD-RTO: 18.4 % (ref 12.4–15.4)
PLATELET # BLD: 155 K/UL (ref 135–450)
PMV BLD AUTO: 10.1 FL (ref 5–10.5)
POTASSIUM SERPL-SCNC: 4.1 MMOL/L (ref 3.5–5.1)
PRO-BNP: 588 PG/ML (ref 0–124)
RBC # BLD: 2.37 M/UL (ref 4.2–5.9)
SODIUM BLD-SCNC: 133 MMOL/L (ref 136–145)
SODIUM URINE: <20 MMOL/L
WBC # BLD: 7.6 K/UL (ref 4–11)

## 2023-02-12 PROCEDURE — 6360000002 HC RX W HCPCS: Performed by: INTERNAL MEDICINE

## 2023-02-12 PROCEDURE — 36415 COLL VENOUS BLD VENIPUNCTURE: CPT

## 2023-02-12 PROCEDURE — 6370000000 HC RX 637 (ALT 250 FOR IP): Performed by: INTERNAL MEDICINE

## 2023-02-12 PROCEDURE — 84300 ASSAY OF URINE SODIUM: CPT

## 2023-02-12 PROCEDURE — 83880 ASSAY OF NATRIURETIC PEPTIDE: CPT

## 2023-02-12 PROCEDURE — 85025 COMPLETE CBC W/AUTO DIFF WBC: CPT

## 2023-02-12 PROCEDURE — 82570 ASSAY OF URINE CREATININE: CPT

## 2023-02-12 PROCEDURE — 94761 N-INVAS EAR/PLS OXIMETRY MLT: CPT

## 2023-02-12 PROCEDURE — 83735 ASSAY OF MAGNESIUM: CPT

## 2023-02-12 PROCEDURE — 2580000003 HC RX 258: Performed by: INTERNAL MEDICINE

## 2023-02-12 PROCEDURE — 80048 BASIC METABOLIC PNL TOTAL CA: CPT

## 2023-02-12 PROCEDURE — 99233 SBSQ HOSP IP/OBS HIGH 50: CPT | Performed by: INTERNAL MEDICINE

## 2023-02-12 PROCEDURE — 2700000000 HC OXYGEN THERAPY PER DAY

## 2023-02-12 PROCEDURE — 2060000000 HC ICU INTERMEDIATE R&B

## 2023-02-12 PROCEDURE — 71045 X-RAY EXAM CHEST 1 VIEW: CPT

## 2023-02-12 PROCEDURE — 6370000000 HC RX 637 (ALT 250 FOR IP): Performed by: STUDENT IN AN ORGANIZED HEALTH CARE EDUCATION/TRAINING PROGRAM

## 2023-02-12 RX ADMIN — VANCOMYCIN HYDROCHLORIDE 500 MG: 10 INJECTION, POWDER, LYOPHILIZED, FOR SOLUTION INTRAVENOUS at 23:14

## 2023-02-12 RX ADMIN — ASPIRIN 81 MG 81 MG: 81 TABLET ORAL at 10:13

## 2023-02-12 RX ADMIN — VANCOMYCIN HYDROCHLORIDE 1250 MG: 10 INJECTION, POWDER, LYOPHILIZED, FOR SOLUTION INTRAVENOUS at 00:26

## 2023-02-12 RX ADMIN — SODIUM CHLORIDE, PRESERVATIVE FREE 10 ML: 5 INJECTION INTRAVENOUS at 10:14

## 2023-02-12 RX ADMIN — PIPERACILLIN AND TAZOBACTAM 3375 MG: 3; .375 INJECTION, POWDER, LYOPHILIZED, FOR SOLUTION INTRAVENOUS at 16:20

## 2023-02-12 RX ADMIN — FOLIC ACID 1 MG: 1 TABLET ORAL at 10:13

## 2023-02-12 RX ADMIN — ENOXAPARIN SODIUM 40 MG: 100 INJECTION SUBCUTANEOUS at 10:12

## 2023-02-12 RX ADMIN — SODIUM CHLORIDE, PRESERVATIVE FREE 10 ML: 5 INJECTION INTRAVENOUS at 21:00

## 2023-02-12 RX ADMIN — Medication 45 MG: at 10:41

## 2023-02-12 RX ADMIN — PIPERACILLIN AND TAZOBACTAM 3375 MG: 3; .375 INJECTION, POWDER, LYOPHILIZED, FOR SOLUTION INTRAVENOUS at 10:46

## 2023-02-12 RX ADMIN — PIPERACILLIN AND TAZOBACTAM 3375 MG: 3; .375 INJECTION, POWDER, LYOPHILIZED, FOR SOLUTION INTRAVENOUS at 04:26

## 2023-02-12 RX ADMIN — POTASSIUM CHLORIDE 20 MEQ: 20 TABLET, EXTENDED RELEASE ORAL at 16:35

## 2023-02-12 RX ADMIN — POTASSIUM CHLORIDE 20 MEQ: 20 TABLET, EXTENDED RELEASE ORAL at 10:16

## 2023-02-12 ASSESSMENT — PAIN SCALES - WONG BAKER

## 2023-02-12 ASSESSMENT — PAIN SCALES - GENERAL
PAINLEVEL_OUTOF10: 0

## 2023-02-12 NOTE — PLAN OF CARE
Problem: Discharge Planning  Goal: Discharge to home or other facility with appropriate resources  2/12/2023 1533 by Diana Napier RN  Flowsheets (Taken 2/12/2023 1533)  Discharge to home or other facility with appropriate resources:   Identify barriers to discharge with patient and caregiver   Refer to discharge planning if patient needs post-hospital services based on physician order or complex needs related to functional status, cognitive ability or social support system   Arrange for interpreters to assist at discharge as needed       Problem: Respiratory - Adult  Goal: Achieves optimal ventilation and oxygenation  2/12/2023 1533 by Diana Napier RN  Flowsheets (Taken 2/12/2023 1533)  Achieves optimal ventilation and oxygenation:   Assess for changes in respiratory status   Assess and instruct to report shortness of breath or any respiratory difficulty   Oxygen supplementation based on oxygen saturation or arterial blood gases

## 2023-02-12 NOTE — PLAN OF CARE
Problem: Pain  Goal: Verbalizes/displays adequate comfort level or baseline comfort level  2/12/2023 1536 by Kirsty Elmore RN  Outcome: Progressing  Flowsheets (Taken 2/12/2023 1536)  Verbalizes/displays adequate comfort level or baseline comfort level:   Implement non-pharmacological measures as appropriate and evaluate response   Encourage patient to monitor pain and request assistance   Administer analgesics based on type and severity of pain and evaluate response

## 2023-02-12 NOTE — PROGRESS NOTES
Internal Medicine  PGY-3  Progress Note    Admit Date: 2/7/2023  Diet: ADULT DIET; Regular; Low Sodium (2 gm)    CC: Dyspnea    Interval history:   Overnight, there were no acute events. Patient was hypotensive to a systolic of 83 which is currently improving. He is currently down to 3 L NC. His chest tube output decreased from 110 mL to 40 mL over 24 hours. Patient was seen this morning in bed. He endorses that he is improving. He has been able to get up and walk to the bathroom. He is also eating a lot more now. He endorsed that his oxygen requirement is also coming down. Patient denies fevers, chills, nausea, vomiting, chest pain, diarrhea, constipation, dysuria, urinary frequency or urgency.      Medications:     Scheduled Meds:   piperacillin-tazobactam  3,375 mg IntraVENous Q8H    vancomycin  1,250 mg IntraVENous Q12H    enoxaparin  40 mg SubCUTAneous Daily    sodium chloride flush  5-40 mL IntraVENous 2 times per day    [Held by provider] valsartan  40 mg Oral 2 times per day    [Held by provider] carvedilol  3.125 mg Oral BID WC    potassium chloride  20 mEq Oral BID WC    aspirin  81 mg Oral Daily    methadone  45 mg Oral Daily    folic acid  1 mg Oral Daily     Continuous Infusions:   sodium chloride 10 mL (02/10/23 1658)     PRN Meds:prochlorperazine, sodium chloride flush, sodium chloride, polyethylene glycol, acetaminophen **OR** acetaminophen, potassium chloride **OR** potassium alternative oral replacement **OR** potassium chloride, magnesium sulfate, perflutren lipid microspheres, aluminum & magnesium hydroxide-simethicone    Objective:   Vitals:   T-max:  Patient Vitals for the past 8 hrs:   BP Temp Temp src Pulse Resp SpO2 Weight   02/12/23 0425 (!) 96/59 98 °F (36.7 °C) Oral 64 17 100 % 230 lb 6.1 oz (104.5 kg)   02/12/23 0015 102/62 98 °F (36.7 °C) Oral 66 18 97 % --       Intake/Output Summary (Last 24 hours) at 2/12/2023 6193  Last data filed at 2/12/2023 0425  Gross per 24 hour Intake 2404.07 ml   Output 340 ml   Net 2064.07 ml       Physical Exam  Constitutional:       Appearance: Normal appearance. He is obese. HENT:      Nose: No congestion. Mouth/Throat:      Mouth: Mucous membranes are moist.   Eyes:      Pupils: Pupils are equal, round, and reactive to light. Cardiovascular:      Rate and Rhythm: Normal rate and regular rhythm. Heart sounds: Normal heart sounds. Pulmonary:      Comments: Increased WOB, patient with bilateral sided (lateal and lower lobe) crackles. Occasionally diffuse wheeze on auscultation. None productive cough. On 3L NC  Chest:      Chest wall: No tenderness. Abdominal:      General: Bowel sounds are normal.      Palpations: Abdomen is soft. Tenderness: There is no abdominal tenderness. Musculoskeletal:      Comments: Chest tube in place   Skin:     General: Skin is warm and dry. Neurological:      Mental Status: He is alert. Review of Systems  - Negative except Interval History    LABS:    CBC:   Recent Labs     02/11/23  0600 02/12/23  0721   WBC 9.9 7.6   HGB 8.3* 7.7*   HCT 26.3* 24.0*    155   .4* 101.4*     Renal:    Recent Labs     02/10/23  0636 02/11/23  0630   * 134*   K 3.8 4.0   CL 92* 95*   CO2 30 33*   BUN 19 16   CREATININE 1.0 1.0   GLUCOSE 81 90   CALCIUM 8.1* 8.4   MG 2.00 2.10   ANIONGAP 9 6     Hepatic: No results for input(s): AST, ALT, BILITOT, BILIDIR, PROT, LABALBU, ALKPHOS in the last 72 hours. Troponin: No results for input(s): TROPONINI in the last 72 hours. BNP: No results for input(s): BNP in the last 72 hours. Lipids: No results for input(s): CHOL, HDL in the last 72 hours. Invalid input(s): LDLCALCU, TRIGLYCERIDE  ABGs:  No results for input(s): PHART, WTP1BHZ, PO2ART, UGA9YVD, BEART, THGBART, Y8PQURYT, YIM9SUV in the last 72 hours. INR: No results for input(s): INR in the last 72 hours.   Lactate: No results for input(s): LACTATE in the last 72 hours.  Cultures:  -----------------------------------------------------------------  RAD:   XR CHEST PORTABLE   Final Result   1. No change. XR CHEST PORTABLE   Final Result   1. Persistent right effusion and right lower lung airspace disease without change from prior. 2. Extensive left-sided airspace disease stable to prior. FLUORO FOR SURGICAL PROCEDURES   Final Result      1. Intraprocedure fluoroscopy was provided. XR CHEST 1 VIEW   Final Result      1. Intraprocedure fluoroscopy was provided. XR CHEST PORTABLE   Final Result      1. No change in appearance of bilateral airspace disease. 2.  Right inferior thoracic pigtail catheter likely pleural drain with small to moderate right pleural effusion unchanged. XR CHEST PORTABLE   Final Result   1. As above. XR CHEST PORTABLE   Final Result      1. Stable appearance of the chest with residual right pleural effusion with right chest tube in place and trace associated right basilar pleural gas. 2.  Extensive bilateral airspace disease in the left greater than right lungs. XR CHEST PORTABLE   Final Result      1. Slightly decreased loculated right pleural effusion status post right chest tube placement. There is a small amount of associated right pleural gas. 2.  Persistent diffuse airspace disease in the left lung and right mid and lower lung airspace disease. CT CHEST PULMONARY EMBOLISM W CONTRAST   Final Result      Linear, eccentric, and left leg filling defects in the right lower lobe segmental and subsegmental pulmonary arteries are suggestive of chronic pulmonary emboli. No definite acute pulmonary emboli identified. Extensive groundglass opacification of both lungs is suspicious for atypical pneumonia although a component of asymmetric pulmonary edema could have a similar appearance. Large complex loculated right pleural fluid collection.  Recommend diagnostic thoracentesis, as hemothorax or malignant effusion cannot be excluded. Trace left pleural effusion. INCIDENTAL FINDINGS: None. XR CHEST PORTABLE   Final Result      Extensive bilateral airspace disease, which may represent pneumonia or pulmonary edema. Cardiomegaly. Moderate to large right and questionable small left pleural effusions. XR CHEST PORTABLE    (Results Pending)       Assessment:   Otis Chaudhary 55 yo M w/ pmhx of chronic PE on Eliquis, IVDU, Hep C, COPD, HTN who presents to the ED with complaints of SOB for the past 6 or 7 days. Pt has had a nonproductive cough as well. Pt denies chest pain, nausea, vomiting, changes in bowel habits. #Acute Hypoxic Respiratory Failure  #Concern for new Heart Failure, ruled out   #Hx COPD   Pt has acute onset shortness of breath that began 6-7 days ago. CT PE shows significant pleural effusions on the right side as well a ground glass opacities. BNP 4462.  6/2022 - patient weight is ~240. Patient this admission is ~225 (down 15 lbs)   Patient unimpressive infectious workup (nl WBC, barely elevated pro alba .19, afebrile). Trop < .01  New O2 requirement (15 HFNC on admission), now on 7L  HF  - Cards consulted  - f/u ECHO: 55%-60% EF, unable to eval diastolic fxn, 41 mmhg PA pressure. - Monitor strict I/O's  - Daily Weights  - Monitor K+, Mg+  COPD  - Duonebs   - wean O2 as tolerated      #Plural Exudative Effusion, suspect empyema   Tap on 2/8 with LD and Protein concerning for exudative effusion   Ddx: Chronic PE vs PNA vs Hemothorax   - Pulm consulted appreciate recs              - Chest tube in to suction, 100 ml out yest, sanguinous appearing. S/p tpa/dornase x1  - Pleural fluid analysis with exudative picture but low glucose. Although output is sanguinous, low glu more indicative of infectious or rheumatoid effusion.              - bronch with no acute findings     #PNA, unclear organism  - Pulm following for effusion as above.    -Pleural fluid analysis with exudative picture but low glucose. Although output is sanguinous, low glu more indicative of infectious or rheumatoid effusion.  - Cont vanc, zosyn  - Bronch with no lesions, samples taken for culture. - PNA workup sent    #KARINA  Likely secondary to suspected prerenal, hemodynamic changes as well as decreasing hemoglobin  Patient baseline creatinine = 1.0  -Today his creatinine jumped to 1.7  -FENa pending. #Chronic PE   CT PE does not show a new PE  Previous admission with + hypercoag workup for showing +homocysteine. .   - ASA  - eliquis held  - Hypercoag labs: B12 nl , folate low, replete     #Acute Anemia    on 2/9  nl B12, folate <2  - folate replete  - Patient also with significant bloody output via Chest tube in past 2 days. - output slowing down today. - If acutely hypotensive or bleeding will check [facility]      #Hx of IVDU  Pt states that he has not used in a long time and is decreasing the dose of methadone  - 45mg Methadone     #HTN  - home coreg, holding today  - home valsartan. Holding today     This patient will be staffed and discussed with Darnell Baker MD.        Code Status:Full code  FEN: Regular  PPX: SCDs  DISPO: Xi Mcdaniels MD, PGY- 3  02/12/23  7:45 AM    This note was likely completed using voice recognition technology and may contain unintended errors. Plan:     Patient was admitted originally for shortness of breath and was found to have a right-sided pleural effusion. He is status post chest tube placement and bronchoscopy. Patient's oxygen requirement has decreased from 8 L HFNC to 3 L NC at this time. Drainage from his chest tube was tested and he was found to have an exudative effusion. He is currently on antibiotic therapy with vancomycin and Zosyn. Pulmonology is following and awaiting BAL cultures. Patient's creatinine did trend up from 1.0 to 1.7. This indicates an KARINA.   Work-up for etiology is currently pending but I suspect it is likely secondary to prerenal.

## 2023-02-12 NOTE — PROGRESS NOTES
COMPLIANCE: Patient has refused bathing when offered twice. He refused to get up to chair and refused to ambulate. He has not voided all day and refuses to attempt to urinate. Patient is also sleeping through meals and has poor oral intake. Resident informed though secure messaging. Will monitor.

## 2023-02-12 NOTE — PROGRESS NOTES
[address] Daily Progress Note      Admit Date:  2/7/2023    Subjective:  Mr. Jannifer Eisenmenger was seen and examined. F/U CHF. Breathing about same. No chest pain. Objective:   BP (!) 96/59   Pulse 64   Temp 98 °F (36.7 °C) (Oral)   Resp 17   Ht 5' 10\" (1.778 m)   Wt 230 lb 6.1 oz (104.5 kg)   SpO2 100%   BMI 33.06 kg/m²     Intake/Output Summary (Last 24 hours) at 2/12/2023 0654  Last data filed at 2/12/2023 0425  Gross per 24 hour   Intake 2404.07 ml   Output 340 ml   Net 2064.07 ml       TELEMETRY: Sinus     Physical Exam:  General:  Awake, alert, NAD  Skin:  Warm and dry  Neck:  JVP difficult  Chest:  decreased BS,crackles, respiration normal at rest.  Cardiovascular:  RRR S1S2  Abdomen:  Soft nontender  Extremities:  no edema    Medications:    piperacillin-tazobactam  3,375 mg IntraVENous Q8H    vancomycin  1,250 mg IntraVENous Q12H    enoxaparin  40 mg SubCUTAneous Daily    sodium chloride flush  5-40 mL IntraVENous 2 times per day    [Held by provider] valsartan  40 mg Oral 2 times per day    [Held by provider] carvedilol  3.125 mg Oral BID WC    potassium chloride  20 mEq Oral BID WC    aspirin  81 mg Oral Daily    methadone  45 mg Oral Daily    folic acid  1 mg Oral Daily      sodium chloride 10 mL (02/10/23 1658)     prochlorperazine, sodium chloride flush, sodium chloride, polyethylene glycol, acetaminophen **OR** acetaminophen, potassium chloride **OR** potassium alternative oral replacement **OR** potassium chloride, magnesium sulfate, perflutren lipid microspheres, aluminum & magnesium hydroxide-simethicone    Lab Data:  CBC:   Recent Labs     02/11/23  0600   WBC 9.9   HGB 8.3*   HCT 26.3*   .4*        BMP:   Recent Labs     02/10/23  0636 02/11/23  0630   * 134*   K 3.8 4.0   CL 92* 95*   CO2 30 33*   BUN 19 16   CREATININE 1.0 1.0     LIVER PROFILE: No results for input(s): AST, ALT, LIPASE, BILIDIR, BILITOT, ALKPHOS in the last 72 hours.     Invalid input(s): AMYLASE,  ALB  PT/INR:   No results for input(s): PROTIME, INR in the last 72 hours. APTT: No results for input(s): APTT in the last 72 hours. BNP:  No results for input(s): BNP in the last 72 hours. IMAGING:     Assessment:  Patient Active Problem List    Diagnosis Date Noted    Hypoxemia 02/11/2023    Acute heart failure, unspecified heart failure type ([facility] [address] 02/08/2023    Normocytic anemia 02/08/2023    Chronic anticoagulation 02/08/2023    HTN (hypertension) 02/08/2023    Opioid dependence on agonist therapy ([facility] [address] 02/08/2023    Hx pulmonary embolism 02/08/2023    Acute pulmonary edema ([facility] [address] 02/08/2023    Hemothorax on right 02/08/2023    Pleural effusion 02/08/2023       Plan:  Breathing may be some better. O2 requirements may be some better. Coreg/diovan on hold, bp marginal. Cr stable. CT in place. Output decreasing. Cytology pending. Check BNP.  + fluid balance. Pulmonary following.        Core Measures:  Discharge instructions:   LVEF documented:   ACEI for LV dysfunction:   Smoking Cessation:    Aysha Danielle MD, MD 2/12/2023 6:54 AM

## 2023-02-12 NOTE — PLAN OF CARE
Problem: Discharge Planning  Goal: Discharge to home or other facility with appropriate resources  Outcome: Progressing  Flowsheets (Taken 2/11/2023 1920)  Discharge to home or other facility with appropriate resources:   Identify barriers to discharge with patient and caregiver   Arrange for needed discharge resources and transportation as appropriate   Identify discharge learning needs (meds, wound care, etc)   Refer to discharge planning if patient needs post-hospital services based on physician order or complex needs related to functional status, cognitive ability or social support system     Problem: Safety - Adult  Goal: Free from fall injury  Outcome: Progressing  Flowsheets (Taken 2/11/2023 1920)  Free From Fall Injury:   Instruct family/caregiver on patient safety   Based on caregiver fall risk screen, instruct family/caregiver to ask for assistance with transferring infant if caregiver noted to have fall risk factors     Problem: ABCDS Injury Assessment  Goal: Absence of physical injury  Outcome: Progressing  Flowsheets (Taken 2/11/2023 1920)  Absence of Physical Injury: Implement safety measures based on patient assessment     Problem: Respiratory - Adult  Goal: Achieves optimal ventilation and oxygenation  Outcome: Progressing  Flowsheets (Taken 2/11/2023 1920)  Achieves optimal ventilation and oxygenation:   Assess for changes in respiratory status   Assess for changes in mentation and behavior   Position to facilitate oxygenation and minimize respiratory effort   Oxygen supplementation based on oxygen saturation or arterial blood gases   Encourage broncho-pulmonary hygiene including cough, deep breathe, incentive spirometry   Assess the need for suctioning and aspirate as needed   Assess and instruct to report shortness of breath or any respiratory difficulty   Respiratory therapy support as indicated     Problem: Cardiovascular - Adult  Goal: Maintains optimal cardiac output and hemodynamic stability  Outcome: Progressing  Flowsheets (Taken 2/11/2023 1920)  Maintains optimal cardiac output and hemodynamic stability:   Monitor blood pressure and heart rate   Monitor urine output and notify Licensed Independent Practitioner for values outside of normal range   Assess for signs of decreased cardiac output  Goal: Absence of cardiac dysrhythmias or at baseline  Outcome: Progressing  Flowsheets (Taken 2/11/2023 1920)  Absence of cardiac dysrhythmias or at baseline:   Monitor cardiac rate and rhythm   Assess for signs of decreased cardiac output     Problem: Metabolic/Fluid and Electrolytes - Adult  Goal: Electrolytes maintained within normal limits  Outcome: Progressing  Flowsheets (Taken 2/11/2023 1920)  Electrolytes maintained within normal limits:   Monitor labs and assess patient for signs and symptoms of electrolyte imbalances   Fluid restriction as ordered   Instruct patient on fluid and nutrition restrictions as appropriate  Goal: Hemodynamic stability and optimal renal function maintained  Outcome: Progressing  Flowsheets (Taken 2/11/2023 1920)  Hemodynamic stability and optimal renal function maintained:   Monitor labs and assess for signs and symptoms of volume excess or deficit   Monitor intake, output and patient weight   Encourage oral intake as appropriate   Instruct patient on fluid and nutrition restrictions as appropriate     Problem: Pain  Goal: Verbalizes/displays adequate comfort level or baseline comfort level  Outcome: Progressing  Flowsheets (Taken 2/11/2023 1920)  Verbalizes/displays adequate comfort level or baseline comfort level:   Encourage patient to monitor pain and request assistance   Assess pain using appropriate pain scale   Administer analgesics based on type and severity of pain and evaluate response   Implement non-pharmacological measures as appropriate and evaluate response   Consider cultural and social influences on pain and pain management   Notify Licensed Independent Practitioner if interventions unsuccessful or patient reports new pain

## 2023-02-12 NOTE — PROGRESS NOTES
Clinical Pharmacy Progress Note    Vancomycin - Management by Pharmacy    Consult Date(s): 2/9/23  Consulting Provider(s): Dr. Rizvi Band / Plan  PNA, Loculated pleural effusion - Vancomycin  Concurrent Antimicrobials:   Zosyn - Day #4  Day of Vanc Therapy / Ordered Duration: #4 of 7  Current Dosing Method: Bayesian-Guided AUC Dosing --> Intermittent dosing  Therapeutic Goal: -600 mg/L*hr --> Trough ~ 15 mcg/mL  Current Dose / Plan:   SCr up to 1.7 today (has been 1.0-1.1 rest of admission). Currently on 1250mg q12h - received dose at 01:00 this AM.  Will HOLD further scheduled Vancomycin for now and use intermittent dosing given new KARINA. Will give Vanc 500mg IV x1 this afternoon to ensure levels remain therapeutic through tomorrow AM.  Will check random level and SCr in AM tomorrow. Will continue to monitor clinical condition and make adjustments to regimen as appropriate. Please call with questions--  Thanks--  Palak Ardon, PharmD, BCPS, BCGP  [id number] ([facility])   2/12/2023 1:15 PM      Interval update:  Pt is s/p bronchoscopy (done 2/10) with no acute findings per notes. BAL culture with no growth thus far. Subjective/Objective:   Devorah Mack is a 56 y.o. male with a PMHx significant for chronic PE (on Apxiaban), IVDU, hepatitis C, COPD, HTN who presented to ED with SOB x 6-7 days. Admitted 2/7 with new onset heart failure. Pulmonology consulted this admission for large R pleural fluid collection - s/p chest tube placement on 2/8. Started on broad spectrum ABx on 2/9. Pulmonology instilled TPA/dornase in chest tube on 2/9. Pharmacy is consulted to dose Vancomycin.     Ht Readings from Last 1 Encounters:   02/08/23 5' 10\" (1.778 m)     Wt Readings from Last 1 Encounters:   02/12/23 230 lb 6.1 oz (104.5 kg)     Current & Prior Antimicrobial Regimen(s):  Zosyn (2/9-current)  Vancomycin - Pharmacy to dose  1250mg IV q12h (2/9-2/12)  Intermittent dosing (2/12-current)    Vancomycin Level(s) / Doses:    Date Time Dose Type of Level / Level Interpretation   2/10 0636 1250mg IV q12h Random = 20.9 mg/L Level drawn ~4h after prior dose  Calculated AUC = 563 mg/L*h with steady-state trough of 16.9 mg/L  Continue same dose          Note: Serum levels collected for AUC-based dosing may be high if collected in close proximity to the dose administered. This is not necessarily indicative of toxicity. Cultures & Sensitivities:    Date Site Micro Susceptibility / Result   2/8 COVID-19 + influenza, Rapid Not detected    2/10 Pleural fluid sent    2/10 MRSA nasal PCR ordered    2/10 Strep pneumo antigen Negative    2/10 Legionella antigen Negative    2/10 BAL No growth to date      Recent Labs     02/10/23  0636 02/11/23  0600 02/11/23  0630 02/12/23  0721   CREATININE 1.0  --  1.0 1.7*   BUN 19  --  16 16   WBC  --  9.9  --  7.6       Estimated Creatinine Clearance: 66 mL/min (A) (based on SCr of 1.7 mg/dL (H)). Additional Lab Values / Findings of Note:    No results for input(s): PROCAL in the last 72 hours.

## 2023-02-13 ENCOUNTER — APPOINTMENT (OUTPATIENT)
Dept: GENERAL RADIOLOGY | Age: 48
DRG: 166 | End: 2023-02-13
Payer: MEDICAID

## 2023-02-13 LAB
ANION GAP SERPL CALCULATED.3IONS-SCNC: 6 MMOL/L (ref 3–16)
BASOPHILS ABSOLUTE: 0 K/UL (ref 0–0.2)
BASOPHILS RELATIVE PERCENT: 0.4 %
BUN BLDV-MCNC: 16 MG/DL (ref 7–20)
CALCIUM SERPL-MCNC: 8.2 MG/DL (ref 8.3–10.6)
CHLORIDE BLD-SCNC: 100 MMOL/L (ref 99–110)
CO2: 30 MMOL/L (ref 21–32)
CREAT SERPL-MCNC: 2.5 MG/DL (ref 0.9–1.3)
CREATININE URINE: 87.9 MG/DL (ref 39–259)
EOSINOPHILS ABSOLUTE: 0.1 K/UL (ref 0–0.6)
EOSINOPHILS RELATIVE PERCENT: 1.9 %
GFR SERPL CREATININE-BSD FRML MDRD: 31 ML/MIN/{1.73_M2}
GLUCOSE BLD-MCNC: 79 MG/DL (ref 70–99)
HCT VFR BLD CALC: 23.5 % (ref 40.5–52.5)
HEMOGLOBIN: 7.5 G/DL (ref 13.5–17.5)
LYMPHOCYTES ABSOLUTE: 0.7 K/UL (ref 1–5.1)
LYMPHOCYTES RELATIVE PERCENT: 10 %
MCH RBC QN AUTO: 32 PG (ref 26–34)
MCHC RBC AUTO-ENTMCNC: 31.9 G/DL (ref 31–36)
MCV RBC AUTO: 100.3 FL (ref 80–100)
MONOCYTES ABSOLUTE: 0.8 K/UL (ref 0–1.3)
MONOCYTES RELATIVE PERCENT: 11.5 %
NEUTROPHILS ABSOLUTE: 5.6 K/UL (ref 1.7–7.7)
NEUTROPHILS RELATIVE PERCENT: 76.2 %
PDW BLD-RTO: 18.2 % (ref 12.4–15.4)
PLATELET # BLD: 177 K/UL (ref 135–450)
PMV BLD AUTO: 10.2 FL (ref 5–10.5)
POTASSIUM SERPL-SCNC: 4.4 MMOL/L (ref 3.5–5.1)
RBC # BLD: 2.34 M/UL (ref 4.2–5.9)
SODIUM BLD-SCNC: 136 MMOL/L (ref 136–145)
SODIUM URINE: 58 MMOL/L
VANCOMYCIN RANDOM: 30.5 UG/ML
WBC # BLD: 7.3 K/UL (ref 4–11)

## 2023-02-13 PROCEDURE — 6370000000 HC RX 637 (ALT 250 FOR IP): Performed by: STUDENT IN AN ORGANIZED HEALTH CARE EDUCATION/TRAINING PROGRAM

## 2023-02-13 PROCEDURE — 84300 ASSAY OF URINE SODIUM: CPT

## 2023-02-13 PROCEDURE — 36415 COLL VENOUS BLD VENIPUNCTURE: CPT

## 2023-02-13 PROCEDURE — 71045 X-RAY EXAM CHEST 1 VIEW: CPT

## 2023-02-13 PROCEDURE — 97116 GAIT TRAINING THERAPY: CPT

## 2023-02-13 PROCEDURE — 97161 PT EVAL LOW COMPLEX 20 MIN: CPT

## 2023-02-13 PROCEDURE — 82570 ASSAY OF URINE CREATININE: CPT

## 2023-02-13 PROCEDURE — 85025 COMPLETE CBC W/AUTO DIFF WBC: CPT

## 2023-02-13 PROCEDURE — 80048 BASIC METABOLIC PNL TOTAL CA: CPT

## 2023-02-13 PROCEDURE — 2060000000 HC ICU INTERMEDIATE R&B

## 2023-02-13 PROCEDURE — 99232 SBSQ HOSP IP/OBS MODERATE 35: CPT | Performed by: INTERNAL MEDICINE

## 2023-02-13 PROCEDURE — 6370000000 HC RX 637 (ALT 250 FOR IP): Performed by: INTERNAL MEDICINE

## 2023-02-13 PROCEDURE — 94761 N-INVAS EAR/PLS OXIMETRY MLT: CPT

## 2023-02-13 PROCEDURE — 80202 ASSAY OF VANCOMYCIN: CPT

## 2023-02-13 PROCEDURE — 6360000002 HC RX W HCPCS: Performed by: INTERNAL MEDICINE

## 2023-02-13 PROCEDURE — 97530 THERAPEUTIC ACTIVITIES: CPT

## 2023-02-13 PROCEDURE — 2580000003 HC RX 258: Performed by: INTERNAL MEDICINE

## 2023-02-13 PROCEDURE — 97166 OT EVAL MOD COMPLEX 45 MIN: CPT

## 2023-02-13 PROCEDURE — 97535 SELF CARE MNGMENT TRAINING: CPT

## 2023-02-13 PROCEDURE — 2700000000 HC OXYGEN THERAPY PER DAY

## 2023-02-13 RX ORDER — LINEZOLID 2 MG/ML
600 INJECTION, SOLUTION INTRAVENOUS EVERY 12 HOURS
Status: DISCONTINUED | OUTPATIENT
Start: 2023-02-14 | End: 2023-02-13

## 2023-02-13 RX ORDER — HEPARIN SODIUM 5000 [USP'U]/ML
5000 INJECTION, SOLUTION INTRAVENOUS; SUBCUTANEOUS EVERY 8 HOURS SCHEDULED
Status: DISCONTINUED | OUTPATIENT
Start: 2023-02-14 | End: 2023-02-17

## 2023-02-13 RX ADMIN — PIPERACILLIN AND TAZOBACTAM 3375 MG: 3; .375 INJECTION, POWDER, LYOPHILIZED, FOR SOLUTION INTRAVENOUS at 02:55

## 2023-02-13 RX ADMIN — ASPIRIN 81 MG 81 MG: 81 TABLET ORAL at 08:32

## 2023-02-13 RX ADMIN — ENOXAPARIN SODIUM 40 MG: 100 INJECTION SUBCUTANEOUS at 08:32

## 2023-02-13 RX ADMIN — PIPERACILLIN AND TAZOBACTAM 3375 MG: 3; .375 INJECTION, POWDER, LYOPHILIZED, FOR SOLUTION INTRAVENOUS at 08:29

## 2023-02-13 RX ADMIN — SODIUM CHLORIDE, PRESERVATIVE FREE 10 ML: 5 INJECTION INTRAVENOUS at 08:36

## 2023-02-13 RX ADMIN — Medication 45 MG: at 10:26

## 2023-02-13 RX ADMIN — FOLIC ACID 1 MG: 1 TABLET ORAL at 08:32

## 2023-02-13 RX ADMIN — PIPERACILLIN AND TAZOBACTAM 3375 MG: 3; .375 INJECTION, POWDER, LYOPHILIZED, FOR SOLUTION INTRAVENOUS at 16:18

## 2023-02-13 ASSESSMENT — PAIN SCALES - WONG BAKER
WONGBAKER_NUMERICALRESPONSE: 0

## 2023-02-13 ASSESSMENT — PAIN SCALES - GENERAL
PAINLEVEL_OUTOF10: 0

## 2023-02-13 NOTE — PLAN OF CARE
Problem: Discharge Planning  Goal: Discharge to home or other facility with appropriate resources  Outcome: Progressing  Flowsheets (Taken 2/12/2023 1945)  Discharge to home or other facility with appropriate resources:   Identify barriers to discharge with patient and caregiver   Arrange for needed discharge resources and transportation as appropriate   Identify discharge learning needs (meds, wound care, etc)   Refer to discharge planning if patient needs post-hospital services based on physician order or complex needs related to functional status, cognitive ability or social support system     Problem: Safety - Adult  Goal: Free from fall injury  Outcome: Progressing  Flowsheets (Taken 2/12/2023 1945)  Free From Fall Injury:   Instruct family/caregiver on patient safety   Based on caregiver fall risk screen, instruct family/caregiver to ask for assistance with transferring infant if caregiver noted to have fall risk factors     Problem: ABCDS Injury Assessment  Goal: Absence of physical injury  Outcome: Progressing  Flowsheets (Taken 2/12/2023 1945)  Absence of Physical Injury: Implement safety measures based on patient assessment     Problem: Respiratory - Adult  Goal: Achieves optimal ventilation and oxygenation  Outcome: Progressing  Flowsheets (Taken 2/12/2023 1945)  Achieves optimal ventilation and oxygenation:   Assess for changes in respiratory status   Assess for changes in mentation and behavior   Position to facilitate oxygenation and minimize respiratory effort   Oxygen supplementation based on oxygen saturation or arterial blood gases   Encourage broncho-pulmonary hygiene including cough, deep breathe, incentive spirometry   Assess the need for suctioning and aspirate as needed   Assess and instruct to report shortness of breath or any respiratory difficulty   Respiratory therapy support as indicated     Problem: Cardiovascular - Adult  Goal: Maintains optimal cardiac output and hemodynamic stability  Outcome: Progressing  Flowsheets (Taken 2/12/2023 1945)  Maintains optimal cardiac output and hemodynamic stability:   Monitor blood pressure and heart rate   Monitor urine output and notify Licensed Independent Practitioner for values outside of normal range   Assess for signs of decreased cardiac output  Goal: Absence of cardiac dysrhythmias or at baseline  Outcome: Progressing  Flowsheets (Taken 2/12/2023 1945)  Absence of cardiac dysrhythmias or at baseline:   Monitor cardiac rate and rhythm   Assess for signs of decreased cardiac output     Problem: Metabolic/Fluid and Electrolytes - Adult  Goal: Electrolytes maintained within normal limits  Outcome: Progressing  Flowsheets (Taken 2/12/2023 1945)  Electrolytes maintained within normal limits:   Monitor labs and assess patient for signs and symptoms of electrolyte imbalances   Administer electrolyte replacement as ordered   Fluid restriction as ordered   Instruct patient on fluid and nutrition restrictions as appropriate  Goal: Hemodynamic stability and optimal renal function maintained  Outcome: Progressing  Flowsheets (Taken 2/12/2023 1945)  Hemodynamic stability and optimal renal function maintained:   Monitor labs and assess for signs and symptoms of volume excess or deficit   Monitor intake, output and patient weight   Encourage oral intake as appropriate   Instruct patient on fluid and nutrition restrictions as appropriate     Problem: Pain  Goal: Verbalizes/displays adequate comfort level or baseline comfort level  Outcome: Progressing  Flowsheets (Taken 2/12/2023 1945)  Verbalizes/displays adequate comfort level or baseline comfort level:   Encourage patient to monitor pain and request assistance   Assess pain using appropriate pain scale   Administer analgesics based on type and severity of pain and evaluate response   Implement non-pharmacological measures as appropriate and evaluate response   Consider cultural and social influences on pain and pain management   Notify Licensed Independent Practitioner if interventions unsuccessful or patient reports new pain     Problem: Skin/Tissue Integrity  Goal: Absence of new skin breakdown  Description: 1.  Monitor for areas of redness and/or skin breakdown  2.  Assess vascular access sites hourly  3.  Every 4-6 hours minimum:  Change oxygen saturation probe site  4.  Every 4-6 hours:  If on nasal continuous positive airway pressure, respiratory therapy assess nares and determine need for appliance change or resting period.  Outcome: Progressing

## 2023-02-13 NOTE — PROGRESS NOTES
Occupational Therapy  Facility/Department: [facility] [address]  Occupational Therapy Initial Assessment/ Treatment    Name: Licha Perez  : 1975  MRN: [id number]  Date of Service: 2023    Discharge Recommendations:     OT Equipment Recommendations  Other: likely shower chair and walker   Licha Perez scored a 19/24 on the AM-PAC ADL Inpatient form. Current research shows that an AM-PAC score of 18 or greater is typically associated with a discharge to the patient's home setting. Based on the patient's AM-PAC score, and their current ADL deficits, it is recommended that the patient have 2-3 sessions per week of Occupational Therapy at d/c to increase the patient's independence. At this time, this patient demonstrates the endurance and safety to discharge home with pulmonary rehab and a follow up treatment frequency of 2-3x/wk. Please see assessment section for further patient specific details. If patient discharges prior to next session this note will serve as a discharge summary. Please see below for the latest assessment towards goals. Patient Diagnosis(es): The primary encounter diagnosis was Acute pulmonary edema ([address] Diagnoses of Pleural effusion, Hypoxia, and Pneumonia due to infectious organism, unspecified laterality, unspecified part of lung were also pertinent to this visit. Past Medical History:  has a past medical history of htn, IVDU (intravenous drug user), and PE (pulmonary thromboembolism) ([address] Past Surgical History:  has a past surgical history that includes bronchoscopy (N/A, 2/10/2023) and bronchoscopy (N/A, 2/10/2023). Treatment Diagnosis: decreased ADLs secondary to PE      Assessment   Performance deficits / Impairments: Decreased functional mobility ; Decreased endurance;Decreased coordination;Decreased ADL status; Decreased balance;Decreased strength;Decreased high-level IADLs  Assessment: Prior to admission pt was living at home with his girlfriend, has recently had more difficulty moving around. Prior to increased weakness pt was independent with ADLs and IADLs, independent with mobility, was not on oxygen prior. Pt now presents below his baseline, demonstrating CGA for transfers and mobility. Pt reported mod fatigue after mobiltiy in room and hallway, however O2 sats WFL on 3lpm O2. Pt would benefit from continued OT while in acute care, plans to dc home to his mother's home at discharge. Continue OT POC. Treatment Diagnosis: decreased ADLs secondary to PE  Prognosis: Fair  Decision Making: Medium Complexity  REQUIRES OT FOLLOW-UP: Yes  Activity Tolerance  Activity Tolerance: Patient limited by fatigue  Activity Tolerance Comments: Pt reported mod fatigue after mobility in room and hallway. On 3lpm O2 during mobility, sats in low 90s. Plan   Occupational Therapy Plan  Times Per Week: 2-5  Times Per Day: Once a day  Current Treatment Recommendations: Strengthening, Balance training, Functional mobility training, Endurance training, Coordination training, Self-Care / ADL, Safety education & training, Patient/Caregiver education & training     Restrictions  Position Activity Restriction  Other position/activity restrictions: up with assistance    Subjective   General  Chart Reviewed: Yes  Patient assessed for rehabilitation services?: Yes  Additional Pertinent Hx: Pt admitted to ED with c/o SOB, (-) COVID. Placed on NRB, RR called on 2/10, then underwent BRONCHOSCOPY WITH BRONCHOALVEOLAR LAVAGE. PMHx includes: HTN, IVDU, PE. Family / Caregiver Present: No  Referring Practitioner: Ramesh Witt MD  Diagnosis: acute pulmonary edema  Subjective  Subjective: Pt semi supine in bed upon arrival, agreeable to OT eval and treat with encouragement. Pt reporting increased fatigue.      Social/Functional History  Social/Functional History  Lives With: Significant other (girlfriend)  Type of Home: Apartment  Home Layout: One level  Home Access: Stairs to enter with rails  Entrance Stairs - Number of Steps: 8  Bathroom Shower/Tub: Tub/Shower unit (sponge bathing for past ~2 weeks)  Bathroom Toilet: Standard (next to sink)  Has the patient had two or more falls in the past year or any fall with injury in the past year?: No  ADL Assistance: Independent (girlfriend assisting recently (~2 weeks) d/t SOB)  Homemaking Assistance:  (girlfriend does all)  Homemaking Responsibilities: No  Ambulation Assistance: Independent  Transfer Assistance: Independent  Active : Yes (not for past few weeks)  Occupation: Unemployed  Additional Comments: girlfriend works full-time. Reporting he can live with his mom for a few days and she can provide 24 hr assistance. Objective              Safety Devices  Type of Devices: Left in chair;Call light within reach; Chair alarm in place  Bed Mobility Training  Bed Mobility Training: Yes  Supine to Sit: Supervision (HOB elevated)  Scooting: Supervision (seated)  Balance  Sitting: Intact  Standing:  (static: SBA; dynamic: CGA with RW)  Transfer Training  Transfer Training: Yes  Sit to Stand: Contact-guard assistance (from bed & chair)  Stand to Sit: Contact-guard assistance  Gait Training  Gait Training: Yes  Gait  Overall Level of Assistance: Contact-guard assistance (pt amb 15 ft, 50 ft with RW & CGA. slow corky, bilat LEs shaky, pt SOB. distance limited by fatigue/SOB. pt on 2 L at rest, 3 L O2 while ambulating (per RN request). O2 sat remained in 90's.)        ADL  Feeding: Beverage management;Setup  Additional Comments: Anticipate pt to require min A for LB dressing and toileting. Activity Tolerance  Activity Tolerance: Patient tolerated evaluation without incident;Patient limited by pain; Patient limited by endurance  Bed mobility  Supine to Sit: Supervision  Transfers  Sit to stand: Contact guard assistance  Stand to sit: Contact guard assistance  Transfer Comments: Mobiltiy in room around bed with CGA, no LOB.  Mobility in hallway with RW with CGA  Vision  Vision: Within Functional Limits  Hearing  Hearing: Within functional limits  Orientation  Overall Orientation Status: Within Normal Limits                  Education Given To: Patient  Education Provided: Role of Therapy;Plan of Care  Education Method: Demonstration  Barriers to Learning: None  Education Outcome: Verbalized understanding                  AM-PAC Score        AM-PAC Inpatient Daily Activity Raw Score: 19 (02/13/23 1541)  AM-PAC Inpatient ADL T-Scale Score : 40.22 (02/13/23 1541)  ADL Inpatient CMS 0-100% Score: 42.8 (02/13/23 1541)  ADL Inpatient CMS G-Code Modifier : CK (02/13/23 1541)    Tinneti Score       Goals  Short Term Goals  Time Frame for Short Term Goals: by dc  Short Term Goal 1: Pt will complete LB dressing with SBA  Short Term Goal 2: Pt will complete toileting with SBA  Short Term Goal 3: Pt will complete standing level grooming with supervision  Patient Goals   Patient goals : to go home       Therapy Time   Individual Concurrent Group Co-treatment   Time In 1419         Time Out 1500         Minutes 41         Timed Code Treatment Minutes: 26 Minutes (+15 min eval)   Brooke ZELAYA  [facility], OTR/L [id number]

## 2023-02-13 NOTE — PROGRESS NOTES
Internal Medicine Note    Patient Name: Rogers Sellers Date: 2/7/2023   Code:Full Code  PCP: None None   Attending: Darnell Baker MD    Chief Complaint: Shortness of Breath (Pt complaining of sob x6 days. Was brought in on a NRB and 94%. States he has not been taking his blood thinner as he should)       Subjective   Interval History:    No acute events overnight    Patient is seen on bedside. Actively coughing and bringing up phlegm  -Complaining of cough and chest tightness  -Oriented x 3  -Vitally stable, on 2L of O2    Denies any headache,  difficulty swallowing, chest pain, fever, chill, night sweats, palpitation, ABD pain, nausea, vomiting, bowel and urinary habit changes, leg swelling,  and change in sleep and appetite.    -Chest tube dec to 20 ml    HPI:   John Sims is a 56 y.o. male, with PMHx of chronic PE on Eliquis, IVDU, Hep C, COPD, HTN who presents to the ED with complaints of SOB for the past 6 or 7 days. Pt has had a nonproductive cough as well. Pt denies chest pain, nausea, vomiting, changes in bowel habits. The pt stated that last year he went to the ED for acute onset chest pain and shortness of breath due to a saddle pulmonary embolism. Pt was placed on eliquis. ECHO was done during the same stay and showed an EF of 60-65% and was unable to determine if there was any pulmonary hypertension. Pt was an IVDU and is not using anymore and is taking methadone and is positive for hep C that has been treated. ED Course  Pt does not look volume overloaded and is not edematous in his extremities. On CT PE, pt has continued chronic pulmonary emboli with no sign of acute PE. There was extensive groundglass opacities of both lungs with more of a pulmonary edema picture. As well as right pleural fluid collection. Cannot exclude hemothorax or malignant effusion. Pt is being admitted for work up for new onset HF as well as diagnostic tap of the pleural fluid.       Labs: K 3.3, procal 0.19, BNP 4462, trop <0.01, wbc wnl  Pleural Effusions found on CXR and CT PE      ROS:  As per HPI and IH    Past Medical Hx:      Diagnosis Date    htn     IVDU (intravenous drug user)     PE (pulmonary thromboembolism) [address] [address]        Past Surgical Hx:      Procedure Laterality Date    BRONCHOSCOPY N/A 2/10/2023    BRONCHOSCOPY ALVEOLAR LAVAGE performed by Huyen Peng MD at [address] [facility] N/A 2/10/2023    BRONCHOSCOPY/TRANSBRONCHIAL LUNG BIOPSY performed by Huyen Peng MD at [address] [place] [address] Medication:  Prior to Admission medications    Medication Sig Start Date End Date Taking? Authorizing Provider   apixaban (ELIQUIS) 5 MG TABS tablet Take 5 mg by mouth 2 times daily   Yes Historical Provider, MD   methadone (DOLOPHINE) 10 MG tablet Take 45 mg by mouth daily. Yes Historical Provider, MD   aspirin 81 MG chewable tablet Take 81 mg by mouth daily    Historical Provider, MD   naproxen (NAPROSYN) 500 MG tablet Take 1 tablet by mouth 2 times daily  Patient not taking: Reported on 2/8/2023 3/29/19   ISHA Knott CNP   methocarbamol (ROBAXIN) 500 MG tablet Take 1 tablet by mouth 3 times daily  Patient not taking: Reported on 2/8/2023 3/29/19   ISHA Knott CNP       Allergies:  No Known Allergies    Social Hx:  Social History     Socioeconomic History    Marital status: Single     Spouse name: None    Number of children: None    Years of education: None    Highest education level: None   Tobacco Use    Smoking status: Every Day     Packs/day: 0.50     Types: Cigarettes    Smokeless tobacco: Never   Substance and Sexual Activity    Alcohol use: Not Currently    Drug use: Not Currently     Types: Opiates         Family Hx:  History reviewed. No pertinent family history.       Objective   Vital Signs:  Patient Vitals for the past 8 hrs:   BP Temp Temp src Pulse Resp SpO2 Weight   02/13/23 0315 120/62 98.1 °F (36.7 °C) Oral 67 17 98 % 234 lb 2.1 oz (106.2 kg)     Physical Exam  Constitutional:       Appearance: He is ill-appearing. HENT:      Head: Normocephalic and atraumatic. Nose: No congestion or rhinorrhea. Mouth/Throat:      Mouth: Mucous membranes are moist.   Eyes:      Extraocular Movements: Extraocular movements intact. Conjunctiva/sclera: Conjunctivae normal.      Pupils: Pupils are equal, round, and reactive to light. Cardiovascular:      Rate and Rhythm: Normal rate and regular rhythm. Pulses: Normal pulses. Heart sounds: Normal heart sounds. No murmur heard. No gallop. Pulmonary:      Effort: Pulmonary effort is normal.      Breath sounds: Rales (Rt lung) present. Comments: Increased WOB. Reduced breath sounds on the right lung auscultation. Crackles on left lung  Abdominal:      General: Abdomen is flat. Bowel sounds are normal. There is no distension. Palpations: Abdomen is soft. Tenderness: There is no abdominal tenderness. Musculoskeletal:         General: No swelling. Cervical back: Normal range of motion and neck supple. Right lower leg: No edema. Left lower leg: No edema. Skin:     Coloration: Skin is not jaundiced or pale. Neurological:      General: No focal deficit present. Mental Status: He is alert and oriented to person, place, and time. Cranial Nerves: No cranial nerve deficit. Sensory: No sensory deficit. Motor: No weakness.    Psychiatric:         Mood and Affect: Mood normal.         Behavior: Behavior normal.      Labs:  CBC:   Recent Labs     02/11/23  0600 02/12/23  0721 02/13/23  0520   WBC 9.9 7.6 7.3   HGB 8.3* 7.7* 7.5*   HCT 26.3* 24.0* 23.5*    155 177       BMP:   Recent Labs     02/11/23  0630 02/12/23  0721 02/13/23  0520   * 133* 136   K 4.0 4.1 4.4   CL 95* 98* 100   CO2 33* 30 30   BUN 16 16 16   CREATININE 1.0 1.7* 2.5*   GLUCOSE 90 82 79    Magnesium:   Recent Labs     02/11/23  0630 02/12/23  0721   MG 2.10 1.90 LFT's: No results for input(s): AST, ALT, ALB, BILITOT, ALKPHOS in the last 72 hours. INR: No results for input(s): INR in the last 72 hours. COVID-19: No results for input(s): COVID19 in the last 72 hours. Radiology:  XR CHEST PORTABLE   Final Result   1. Slight improvement in multifocal airspace disease. 2.  No significant change in the right pleural effusion. XR CHEST PORTABLE   Final Result   1. No change. XR CHEST PORTABLE   Final Result   1. Persistent right effusion and right lower lung airspace disease without change from prior. 2. Extensive left-sided airspace disease stable to prior. FLUORO FOR SURGICAL PROCEDURES   Final Result      1. Intraprocedure fluoroscopy was provided. XR CHEST 1 VIEW   Final Result      1. Intraprocedure fluoroscopy was provided. XR CHEST PORTABLE   Final Result      1. No change in appearance of bilateral airspace disease. 2.  Right inferior thoracic pigtail catheter likely pleural drain with small to moderate right pleural effusion unchanged. XR CHEST PORTABLE   Final Result   1. As above. XR CHEST PORTABLE   Final Result      1. Stable appearance of the chest with residual right pleural effusion with right chest tube in place and trace associated right basilar pleural gas. 2.  Extensive bilateral airspace disease in the left greater than right lungs. XR CHEST PORTABLE   Final Result      1. Slightly decreased loculated right pleural effusion status post right chest tube placement. There is a small amount of associated right pleural gas. 2.  Persistent diffuse airspace disease in the left lung and right mid and lower lung airspace disease. CT CHEST PULMONARY EMBOLISM W CONTRAST   Final Result      Linear, eccentric, and left leg filling defects in the right lower lobe segmental and subsegmental pulmonary arteries are suggestive of chronic pulmonary emboli. No definite acute pulmonary emboli identified. Extensive groundglass opacification of both lungs is suspicious for atypical pneumonia although a component of asymmetric pulmonary edema could have a similar appearance. Large complex loculated right pleural fluid collection. Recommend diagnostic thoracentesis, as hemothorax or malignant effusion cannot be excluded. Trace left pleural effusion. INCIDENTAL FINDINGS: None. XR CHEST PORTABLE   Final Result      Extensive bilateral airspace disease, which may represent pneumonia or pulmonary edema. Cardiomegaly. Moderate to large right and questionable small left pleural effusions. XR CHEST PORTABLE    (Results Pending)     Medications:   vancomycin (VANCOCIN) intermittent dosing (placeholder)   Other RX Placeholder    piperacillin-tazobactam  3,375 mg IntraVENous Q8H    enoxaparin  40 mg SubCUTAneous Daily    sodium chloride flush  5-40 mL IntraVENous 2 times per day    [Held by provider] valsartan  40 mg Oral 2 times per day    [Held by provider] carvedilol  3.125 mg Oral BID WC    potassium chloride  20 mEq Oral BID WC    aspirin  81 mg Oral Daily    methadone  45 mg Oral Daily    folic acid  1 mg Oral Daily       sodium chloride 10 mL (02/10/23 5563)      prochlorperazine, sodium chloride flush, sodium chloride, polyethylene glycol, acetaminophen **OR** acetaminophen, potassium chloride **OR** potassium alternative oral replacement **OR** potassium chloride, magnesium sulfate, perflutren lipid microspheres, aluminum & magnesium hydroxide-simethicone       Assessment & Plan   Angelique Montejo is a 56 y.o. male, with PMHx of chronic PE on Eliquis, IVDU, Hep C, COPD, HTN who presents to the ED with complaints of SOB for the past 6 or 7 days. Pt has had a nonproductive cough as well. Acute Hypoxic Respiratory Failure  Likely 2/2 to pleural effusion   Ddx: Pneumonia? ?  Pt has acute onset shortness of breath that began 6-7 days ago.  CT PE shows significant pleural effusions on the right side as well a ground glass opacities. BNP 4462.  6/2022 - patient weight is ~240. Patient this admission is ~225 (down 15 lbs)   Patient unimpressive infectious workup (nl WBC, barely elevated pro alba .19, afebrile). Trop < .01  New O2 requirement (15 HFNC on admission), now on 7L  ECHO: 55%-60% EF, unable to eval diastolic fxn, 41 mmhg   - Cards consulted: Dillon Recs  - Pulmonology consulted dillon recs  - Continue Zosyn  - Stop Vanc considering worsening KARINA    Large complex loculated right pleural fluid  Unknown etiology: culture pending  Tap on 2/8 with LD and Protein concerning for exudative effusion   Ddx: Chronic PE vs PNA vs Hemothorax   - Pulm consulted appreciate recs              - Chest tube in to suction, 40 ml out yest, sanguinous appearing. S/p tpa/dornase x1  - Pleural fluid analysis with exudative picture but low glucose. Although output is sanguinous, low glu more indicative of infectious or rheumatoid effusion.              - bronch with no acute findings     KARINA  Likely secondary to suspected prerenal, hemodynamic changes as well as decreasing hemoglobin  Ddx: Vanc and Zosyn combo  Patient baseline creatinine = 1.0  Today his creatinine jumped to 2.5  Urinary sodium <20  -Stop nephro toxic med (Vanc)  -Nephro consult: dillon recs     Chronic PE   CT PE does not show a new PE  Previous admission with + hypercoag workup for showing +homocysteine. .   - ASA  - eliquis held  - Hypercoag labs: B12 nl , folate low, replete     Acute Anemia    on 2/9  nl B12, folate <2  - folate replete  - Patient also with significant bloody output via Chest tube in past 2 days. - output slowing down today. - If acutely hypotensive or bleeding will check HnH      Hx of IVDU  Pt states that he has not used in a long time and is decreasing the dose of methadone  - 45mg Methadone     HTN  - home coreg, holding today  - home valsartan. Holding today      DVT PPx: Heparin  Diet: ADULT DIET; Regular;  Low Sodium (2 gm) Code status:  Full Code   ELOS: 3   Barriers to discharge: Clinical picture  Disposition  - Preadmission: Home  - Current: Medical Floor  - Upon discharge: TBD    Will discuss with attending physician Corinne Grace, MD  ________________________  Mardee Lefort, MD,   PGY-1, Internal Medicine  02/13/23  8:21 AM

## 2023-02-13 NOTE — PLAN OF CARE
Problem: Discharge Planning  Goal: Discharge to home or other facility with appropriate resources  2/13/2023 1037 by Amador Hughes RN  Outcome: Progressing  Flowsheets  Taken 2/13/2023 2386  Discharge to home or other facility with appropriate resources: Identify barriers to discharge with patient and caregiver  Taken 2/13/2023 0800  Discharge to home or other facility with appropriate resources: Identify barriers to discharge with patient and caregiver    Problem: Safety - Adult  Goal: Free from fall injury  2/13/2023 1037 by Amador Hughes RN  Outcome: Progressing    Problem: ABCDS Injury Assessment  Goal: Absence of physical injury  2/13/2023 1037 by Amador Hughes RN  Outcome: Progressing  Fall precautions in place      Problem: Respiratory - Adult  Goal: Achieves optimal ventilation and oxygenation  2/13/2023 1037 by Amador Hughes RN  Outcome: Progressing  Flowsheets  Taken 2/13/2023 0928  Achieves optimal ventilation and oxygenation:   Assess for changes in respiratory status   Assess for changes in mentation and behavior   Position to facilitate oxygenation and minimize respiratory effort   Oxygen supplementation based on oxygen saturation or arterial blood gases  Taken 2/13/2023 0800  Achieves optimal ventilation and oxygenation: Assess for changes in respiratory status  2/13/2023 0605 by Fallon Herrera RN  Outcome: Progressing  Flowsheets (Taken 2/12/2023 1945)  Achieves optimal ventilation and oxygenation:   Assess for changes in respiratory status   Assess for changes in mentation and behavior   Position to facilitate oxygenation and minimize respiratory effort   Oxygen supplementation based on oxygen saturation or arterial blood gases   Encourage broncho-pulmonary hygiene including cough, deep breathe, incentive spirometry   Assess the need for suctioning and aspirate as needed   Assess and instruct to report shortness of breath or any respiratory difficulty   Respiratory therapy support as indicated     Problem: Cardiovascular - Adult  Goal: Maintains optimal cardiac output and hemodynamic stability  2/13/2023 1037 by Omgea Jacinto RN  Outcome: Progressing  Flowsheets  Taken 2/13/2023 0928  Maintains optimal cardiac output and hemodynamic stability:   Monitor blood pressure and heart rate   Monitor urine output and notify Licensed Independent Practitioner for values outside of normal range   Assess for signs of decreased cardiac output  Taken 2/13/2023 0800  Maintains optimal cardiac output and hemodynamic stability: Monitor blood pressure and heart rate  2/13/2023 0605 by Corinne Helena Wilson, RN  Outcome: Progressing  Flowsheets (Taken 2/12/2023 1945)  Maintains optimal cardiac output and hemodynamic stability:   Monitor blood pressure and heart rate   Monitor urine output and notify Licensed Independent Practitioner for values outside of normal range   Assess for signs of decreased cardiac output  Goal: Absence of cardiac dysrhythmias or at baseline  2/13/2023 1037 by Omega Jacinto RN  Outcome: Progressing  Flowsheets  Taken 2/13/2023 0928  Absence of cardiac dysrhythmias or at baseline:   Monitor cardiac rate and rhythm   Assess for signs of decreased cardiac output   Administer antiarrhythmia medication and electrolyte replacement as ordered  Taken 2/13/2023 0800  Absence of cardiac dysrhythmias or at baseline: Monitor cardiac rate and rhythm    Problem: Cardiovascular - Adult  Goal: Absence of cardiac dysrhythmias or at baseline  2/13/2023 1037 by Omega Jacinto RN  Outcome: Progressing  Flowsheets  Taken 2/13/2023 0928  Absence of cardiac dysrhythmias or at baseline:   Monitor cardiac rate and rhythm   Assess for signs of decreased cardiac output   Administer antiarrhythmia medication and electrolyte replacement as ordered  Taken 2/13/2023 0800  Absence of cardiac dysrhythmias or at baseline: Monitor cardiac rate and rhythm  2/13/2023 0605 by Corinne Helena Wilson, RN  Outcome:  Progressing  Flowsheets (Taken 2/12/2023 1945)  Absence of cardiac dysrhythmias or at baseline:   Monitor cardiac rate and rhythm   Assess for signs of decreased cardiac output     Problem: Metabolic/Fluid and Electrolytes - Adult  Goal: Electrolytes maintained within normal limits  2/13/2023 1037 by Omega Jacinto RN  Outcome: Progressing  Flowsheets  Taken 2/13/2023 0928  Electrolytes maintained within normal limits:   Monitor labs and assess patient for signs and symptoms of electrolyte imbalances   Administer electrolyte replacement as ordered   Monitor response to electrolyte replacements, including repeat lab results as appropriate  Taken 2/13/2023 0800  Electrolytes maintained within normal limits:   Monitor labs and assess patient for signs and symptoms of electrolyte imbalances   Administer electrolyte replacement as ordered   Monitor response to electrolyte replacements, including repeat lab results as appropriate  2/13/2023 0605 by Corinne Helena Wilson, RN  Outcome: Progressing  Flowsheets (Taken 2/12/2023 1945)  Electrolytes maintained within normal limits:   Monitor labs and assess patient for signs and symptoms of electrolyte imbalances   Administer electrolyte replacement as ordered   Fluid restriction as ordered   Instruct patient on fluid and nutrition restrictions as appropriate  Goal: Hemodynamic stability and optimal renal function maintained  2/13/2023 1037 by Omega Jacinto RN  Outcome: Progressing  Flowsheets  Taken 2/13/2023 0928  Hemodynamic stability and optimal renal function maintained:   Monitor labs and assess for signs and symptoms of volume excess or deficit   Monitor intake, output and patient weight   Monitor urine specific gravity, serum osmolarity and serum sodium as indicated or ordered  Taken 2/13/2023 0800  Hemodynamic stability and optimal renal function maintained:   Monitor labs and assess for signs and symptoms of volume excess or deficit   Monitor intake, output and patient  weight   Monitor urine specific gravity, serum osmolarity and serum sodium as indicated or ordered  2/13/2023 1957 by Julisa Patel RN  Outcome: Progressing  Flowsheets (Taken 2/12/2023 1945)  Hemodynamic stability and optimal renal function maintained:   Monitor labs and assess for signs and symptoms of volume excess or deficit   Monitor intake, output and patient weight   Encourage oral intake as appropriate   Instruct patient on fluid and nutrition restrictions as appropriate     Problem: Metabolic/Fluid and Electrolytes - Adult  Goal: Hemodynamic stability and optimal renal function maintained  2/13/2023 1037 by Miriam Goldstein RN  Outcome: Progressing  Flowsheets  Taken 2/13/2023 0928  Hemodynamic stability and optimal renal function maintained:   Monitor labs and assess for signs and symptoms of volume excess or deficit   Monitor intake, output and patient weight   Monitor urine specific gravity, serum osmolarity and serum sodium as indicated or ordered  Taken 2/13/2023 0800  Hemodynamic stability and optimal renal function maintained:   Monitor labs and assess for signs and symptoms of volume excess or deficit   Monitor intake, output and patient weight   Monitor urine specific gravity, serum osmolarity and serum sodium as indicated or ordered    Problem: Pain  Goal: Verbalizes/displays adequate comfort level or baseline comfort level  2/13/2023 1037 by Miriam Goldstein RN  Outcome: Progressing    Problem: Skin/Tissue Integrity  Goal: Absence of new skin breakdown  Description: 1. Monitor for areas of redness and/or skin breakdown  2. Assess vascular access sites hourly  3. Every 4-6 hours minimum:  Change oxygen saturation probe site  4. Every 4-6 hours:  If on nasal continuous positive airway pressure, respiratory therapy assess nares and determine need for appliance change or resting period.   2/13/2023 1037 by Miriam Goldstein RN

## 2023-02-13 NOTE — PROGRESS NOTES
Clinical Pharmacy Progress Note    Vancomycin - Management by Pharmacy    Consult Date(s): 2/9/23  Consulting Provider(s): Dr. Neftali Sharif / Plan  PNA, Loculated pleural effusion - Vancomycin  Concurrent Antimicrobials:   Zosyn - Day #5  Day of Vanc Therapy / Ordered Duration: #5 of 7  Current Dosing Method: Intermittent dosing  Therapeutic Goal: Trough ~ 15 mcg/mL  Current Dose / Plan:   Pt with KARINA - SCr continues to trend up (1?1.7?2.5). Given KARINA, will continue to dose vancomycin based on intermittent levels. Received 500mg IV x1 last night. Level this AM = 30.5 mg/L - drawn ~6h after previous dose (not trough level). Will not need dose today as anticipate level to remain > 15 mg/L through tomorrow AM.  Random level ordered for tomorrow AM - Tues 2/14 to assess timing of next dose. Will continue to monitor clinical condition and make adjustments to regimen as appropriate. Please call with questions--  Parth Potts, PharmD, BCPS  Wireless: [phone]   2/13/2023 7:51 AM        Interval update:   BAL culture with no growth thus far. SCr continues to trend up (1.7?2.5). Subjective/Objective:   Xiomara Garza is a 56 y.o. male with a PMHx significant for chronic PE (on Apxiaban), IVDU, hepatitis C, COPD, HTN who presented to ED with SOB x 6-7 days. Admitted 2/7 with new onset heart failure. Pulmonology consulted this admission for large R pleural fluid collection - s/p chest tube placement on 2/8. Started on broad spectrum ABx on 2/9. Pulmonology instilled TPA/dornase in chest tube on 2/9. Pharmacy is consulted to dose Vancomycin.     Ht Readings from Last 1 Encounters:   02/08/23 5' 10\" (1.778 m)     Wt Readings from Last 1 Encounters:   02/13/23 234 lb 2.1 oz (106.2 kg)     Current & Prior Antimicrobial Regimen(s):  Zosyn (2/9-current)  Vancomycin - Pharmacy to dose   (2/9-2/12)  Intermittent dosing (2/12-current)    Date Dose Vanc Level   2/12 1250mg IV (00:26)  500mg IV (23:14)    2/13 -- 30.5 mg/L              Vancomycin Level(s) / Doses:    Date Time Dose Type of Level / Level Interpretation   2/10 0636 1250mg IV q12h Random = 20.9 mg/L Level drawn ~4h after prior dose  Calculated AUC = 563 mg/L*h with steady-state trough of 16.9 mg/L  Continue same dose          Note: Serum levels collected for AUC-based dosing may be high if collected in close proximity to the dose administered. This is not necessarily indicative of toxicity. Cultures & Sensitivities:    Date Site Micro Susceptibility / Result   2/8 COVID-19 + influenza, Rapid Not detected    2/10 Pleural fluid sent    2/10 MRSA nasal PCR ordered    2/10 Strep pneumo antigen Negative    2/10 Legionella antigen Negative    2/10 BAL No growth to date      Recent Labs     02/11/23  0600 02/11/23  0630 02/12/23  0721 02/13/23  0520   CREATININE  --  1.0 1.7* 2.5*   BUN  --  16 16 16   WBC 9.9  --  7.6 7.3       Estimated Creatinine Clearance: 45 mL/min (A) (based on SCr of 2.5 mg/dL (H)). Additional Lab Values / Findings of Note:    No results for input(s): PROCAL in the last 72 hours.

## 2023-02-13 NOTE — CONSULTS
[facility]    [facility]              [phone]                       Plan :     - Agree with changing Vancomycin to Linezolid  - Hold fluids/blood pressure medications given normal blood pressure today   - Strict I's and O's  - Avoid nephrotoxic medications  - Ordered urine Na and Crea  - Monitor labwork       Assessment :     Andre Esteves is a 56 y.o. male with hx of HTN, DVT, PE, treated Hep C, and COPD on HOD#5 for SOB secondary to right pleural collection (suspected hemothorax). Our services were consulted for ATN. KARINA stage 2, suspected ischemic ATN with superimposed Vancomycin ATN  - Pt with signs of ATN today given acute kidney injury class II with etiologies supporting ATN, ischemic vs. Nephrotoxic. Given the history of intermittent hypotension (now normal) and elevated serum vancomycin level (30.5 on 2/13) preceding the creatinine increase, we suspect ATN due to a combination of these causes. As patient has started to urinate again, we suspect gradual improvement and return to function of the kidneys with normal blood pressure and removal of vancomycin  - Discontinue vancomycin; continue Linezolid as ordered by primary team  - Continue blood pressure control   - continue to hold fluids given normal BP today   - Hold BP medications (carvedilol, valsartan)  - Continue to trend labs    Hypotension (resolved)  Intermittent on admission, but well-controlled today.    - continue to hold fluids given normal BP today  - Hold BP medications (carvedilol, valsartan)  - [facility] would like to thank Nicola Clement MD   for opportunity to serve this patient      Please call with questions at-   24 Hrs Answering service [phone] or  7 am- 5 pm via Perfect serve or cell phone  Irene Mascorro MD        CC/reason for consult :     ATN     HPI :     Andre Esteves is a 56 y.o. male with history of HTN, DVT, PE, treated Hep C, and COPD who presented to the hospital on 2/7/2023 with shortness of breath. On CT chest on 2/8, showed large right pleural fluid collection with suspected hemothorax  secondary to suspected trauma; chest tube was placed on 2/8 with drainage of sanguinous fluid. Pleural fluid analysis showed , total protein of 4.3, glucose 20 suggesting exudative pleural effusion/empyema or hemothorax. Given collection, patient was started on Zosyn (ongoing until 2/16) and vancomycin on 2/9 (completed on 2/12). Patient afebrile, normocardic, and with normal WBC through hospitalization, but with intermittent hypotension as low as the 42-90P systolic. For the past several days, the patient's creatinine has been elevating (2.5 today from 1.0 on 2/10) leading to consultation of Nephrology for concerns of ATN. Interval History:     Patient reports overall doing well with improving SOB. Chest tube continuing to drain small amounts of sanguinous fluid. Patient reports that he has started to urinate again, albeit it being dark in color. Denies any increases in urinary frequency otherwise or any tenderness with urination. Denies any lower back pain. ROS:     Seen with- Dr. Jazmín Rao MD, Elise Storey, MS4    positives in bold   Constitutional:  fever, chills, weakness, weight change, fatigue  Skin:  rash, pruritus, hair loss, bruising, dry skin, petechiae  Head, Face, Neck   headaches, swelling,  cervical adenopathy  Respiratory: shortness of breath, cough, or wheezing  Cardiovascular: chest pain, palpitations, dizzy, edema  Gastrointestinal: nausea, vomiting, diarrhea, constipation,belly pain    Yellow skin, blood in stool  Musculoskeletal:  back pain, muscle weakness, gait problems,       joint pain, swelling of the legs  Genitourinary:  dysuria, poor urine flow, flank pain, dark urine  Neurologic:  vertigo, TIA'S, syncope, seizures, focal weakness  Psychosocial:  insomnia, anxiety, or depression. Additional positive findings:                           All other remaining systems are negative. PMH/PSH/SH/Family History:     Past Medical History:   Diagnosis Date    htn     IVDU (intravenous drug user)     PE (pulmonary thromboembolism) ([facility] [address]        Past Surgical History:   Procedure Laterality Date    BRONCHOSCOPY N/A 2/10/2023    BRONCHOSCOPY ALVEOLAR LAVAGE performed by Girma Burch MD at [address] N/A 2/10/2023    BRONCHOSCOPY/TRANSBRONCHIAL LUNG BIOPSY performed by Girma Burch MD at [address] History     Socioeconomic History    Marital status: Single     Spouse name: Not on file    Number of children: Not on file    Years of education: Not on file    Highest education level: Not on file   Occupational History    Not on file   Tobacco Use    Smoking status: Every Day     Packs/day: 0.50     Types: Cigarettes    Smokeless tobacco: Never   Substance and Sexual Activity    Alcohol use: Not Currently    Drug use: Not Currently     Types: Opiates     Sexual activity: Not on file   Other Topics Concern    Not on file   Social History Narrative    Not on file     Social Determinants of Health     Financial Resource Strain: Not on file   Food Insecurity: Not on file   Transportation Needs: Not on file   Physical Activity: Not on file   Stress: Not on file   Social Connections: Not on file   Intimate Partner Violence: Not on file   Housing Stability: Not on file       History reviewed. No pertinent family history.        Medication:     Scheduled Meds:   [START ON 2/14/2023] heparin (porcine)  5,000 Units SubCUTAneous 3 times per day    piperacillin-tazobactam  3,375 mg IntraVENous Q8H    sodium chloride flush  5-40 mL IntraVENous 2 times per day    [Held by provider] valsartan  40 mg Oral 2 times per day    [Held by provider] carvedilol  3.125 mg Oral BID WC    potassium chloride  20 mEq Oral BID WC    aspirin  81 mg Oral Daily    methadone  45 mg Oral Daily    folic acid  1 mg Oral Daily     Continuous Infusions: sodium chloride 10 mL (02/10/23 7981)     PRN Meds:.prochlorperazine, sodium chloride flush, sodium chloride, polyethylene glycol, acetaminophen **OR** acetaminophen, potassium chloride **OR** potassium alternative oral replacement **OR** potassium chloride, magnesium sulfate, perflutren lipid microspheres, aluminum & magnesium hydroxide-simethicone       Vitals :     Vitals:    02/13/23 1000   BP:    Pulse: 80   Resp: 16   Temp:    SpO2: 97%       I & O :       Intake/Output Summary (Last 24 hours) at 2/13/2023 1231  Last data filed at 2/13/2023 5656  Gross per 24 hour   Intake 130 ml   Output 1055 ml   Net -925 ml        Physical Examination :     General appearance: Anxious- no, distressed- no, in good spirits- yes  HEENT: Lips- normal, teeth- ok , oral mucosa- moist  Neck : Mass- no, appears symmetrical, JVD- not visible  Respiratory: Respiratory effort-  normal, wheeze- no, crackles - no. Patient with right chest tube present. Cardiovascular:  Ausculation- No M/R/G, 1+ pitting edema of the bilateral lower extremities and feet  Abdomen: visible mass- no, distention- no, scar- no, tenderness- no                            hepatosplenomegaly-  no  Musculoskeletal:  clubbing no,cyanosis- no , digital ischemia- no                           muscle strength- grossly normal , tone - grossly normal  Skin: rashes- no , ulcers- no, induration- no, tightening - no  Psychiatric:  Judgement and insight- normal           AAO X 3     LABS:     Recent Labs     02/11/23  0600 02/12/23  0721 02/13/23  0520   WBC 9.9 7.6 7.3   HGB 8.3* 7.7* 7.5*   HCT 26.3* 24.0* 23.5*    155 177     Recent Labs     02/11/23  0630 02/12/23  0721 02/13/23  0520   * 133* 136   K 4.0 4.1 4.4   CL 95* 98* 100   CO2 33* 30 30   BUN 16 16 16   CREATININE 1.0 1.7* 2.5*   GLUCOSE 90 82 79   MG 2.10 1.90  --         Will discuss with Dr. Tyrell Russo MD  Internal Medicine, PGY3    Tal Gram  Internal Medicine, MS4        Patient was seen and examined and the case was discussed with the resident. He acted as my scribe. I agree with the assessment and plan.     Thanks  Nephrology  [address]  Office: [phone]  Cell: [phone]  Fax: [phone]

## 2023-02-13 NOTE — PROGRESS NOTES
Pulmonary Followup Note    CC: Pneumonitis    Interval History:  No significant issues today. Feels he is breathing better, still reports exertional shortness of breath however. Down to 2 L today and HDS. Labs do show new KARINA up to 2.5, baseline 1. Likely in setting of hemodynamic changes/possible vancomycin use, thus discontinued    Chest tube drained 50 cc / 24-hour  UOP okay 600 cc/24h       [START ON 2023] heparin (porcine)  5,000 Units SubCUTAneous 3 times per day    piperacillin-tazobactam  3,375 mg IntraVENous Q8H    sodium chloride flush  5-40 mL IntraVENous 2 times per day    [Held by provider] valsartan  40 mg Oral 2 times per day    [Held by provider] carvedilol  3.125 mg Oral BID WC    aspirin  81 mg Oral Daily    methadone  45 mg Oral Daily    folic acid  1 mg Oral Daily           PHYSICAL EXAMINATION:  /76   Pulse 86   Temp 97.8 °F (36.6 °C) (Oral)   Resp 16   Ht 5' 10\" (1.778 m)   Wt 234 lb 2.1 oz (106.2 kg)   SpO2 98%   BMI 33.59 kg/m²   CURRENT PULSE OXIMETRY:  SpO2: 98 %  24HR PULSE OXIMETRY RANGE:  SpO2  Av.4 %  Min: 89 %  Max: 98 % on 2LNC      Gen: No acute distress. Speaking in full sentences with no issue at rest.   HEENT: PERRL, EOMI, OP nl  Lung: Diminished breath sounds, minimal wheezing  Chest: Chest tube in place, no surrounding erythema  CV: RRR without M/R/R  Abd: +BS, soft, NT/ND  Ext: No edema. DATA  CBC:   Recent Labs     23  0600 23  0721 23  0520   WBC 9.9 7.6 7.3   HGB 8.3* 7.7* 7.5*   HCT 26.3* 24.0* 23.5*   .4* 101.4* 100.3*    155 177     BMP:   Recent Labs     23  0630 23  0721 23  0520   * 133* 136   K 4.0 4.1 4.4   CL 95* 98* 100   CO2 33* 30 30   BUN 16 16 16   CREATININE 1.0 1.7* 2.5*     No results for input(s): PHART, ENM5UTO, PO2ART in the last 72 hours.   LIVER PROFILE: No results for input(s): AST, ALT, LIPASE, BILIDIR, BILITOT, ALKPHOS in the last 72 hours. Invalid input(s): AMYLASE,  ALB    CXR REVIEWED BY ME AND SHOWED:  XR CHEST PORTABLE   Final Result   1. Slight improvement in multifocal airspace disease. 2.  No significant change in the right pleural effusion. XR CHEST PORTABLE   Final Result   1. No change. XR CHEST PORTABLE   Final Result   1. Persistent right effusion and right lower lung airspace disease without change from prior. 2. Extensive left-sided airspace disease stable to prior. FLUORO FOR SURGICAL PROCEDURES   Final Result      1. Intraprocedure fluoroscopy was provided. XR CHEST 1 VIEW   Final Result      1. Intraprocedure fluoroscopy was provided. XR CHEST PORTABLE   Final Result      1. No change in appearance of bilateral airspace disease. 2.  Right inferior thoracic pigtail catheter likely pleural drain with small to moderate right pleural effusion unchanged. XR CHEST PORTABLE   Final Result   1. As above. XR CHEST PORTABLE   Final Result      1. Stable appearance of the chest with residual right pleural effusion with right chest tube in place and trace associated right basilar pleural gas. 2.  Extensive bilateral airspace disease in the left greater than right lungs. XR CHEST PORTABLE   Final Result      1. Slightly decreased loculated right pleural effusion status post right chest tube placement. There is a small amount of associated right pleural gas. 2.  Persistent diffuse airspace disease in the left lung and right mid and lower lung airspace disease. CT CHEST PULMONARY EMBOLISM W CONTRAST   Final Result      Linear, eccentric, and left leg filling defects in the right lower lobe segmental and subsegmental pulmonary arteries are suggestive of chronic pulmonary emboli. No definite acute pulmonary emboli identified.       Extensive groundglass opacification of both lungs is suspicious for atypical pneumonia although a component of asymmetric pulmonary edema could have a similar appearance. Large complex loculated right pleural fluid collection. Recommend diagnostic thoracentesis, as hemothorax or malignant effusion cannot be excluded. Trace left pleural effusion. INCIDENTAL FINDINGS: None. XR CHEST PORTABLE   Final Result      Extensive bilateral airspace disease, which may represent pneumonia or pulmonary edema. Cardiomegaly. Moderate to large right and questionable small left pleural effusions. XR CHEST PORTABLE    (Results Pending)        ASSESSMENT/PLAN:    Right hemothorax  Diffuse alveolar opacities of uncertain etiology  - Cultures remaining negative  - awaiting BAL and transbronchial biopsy results  - repeat BNP improved  - on Zosyn (2/9) - Cx NGTD      Savana Iraheta MD    Patient examined, findings as discussed with Dr. Kevon Ashley. Agree with assessment and plan. Hemothorax not changing on radiograph, but little fluid is forthcoming. His response to tPA was to have significantly more bleeding, which appears to be counterproductive. It did not trigger better drainage. Continuing empiric antibiotic therapy pending biopsy results.

## 2023-02-13 NOTE — CONSULTS
Consult received. Labs and notes were reviewed. Case was discussed with the staff. Full note to follow.     Thanks  Nephrology  [address]  Office: [phone]  Cell: [phone]  Fax: [phone]

## 2023-02-13 NOTE — PROGRESS NOTES
Physical Therapy  Facility/Department: [facility] [facility]  Physical Therapy Initial Assessment/Treatment    Name: Licha Perez  : 1975  MRN: [id number]  Date of Service: 2023    Discharge Recommendations:    Licha Perez scored a 18/24 on the AM-PAC short mobility form. Current research shows that an AM-PAC score of 18 or greater is typically associated with a discharge to the patient's home setting. Based on the patient's AM-PAC score and their current functional mobility deficits, it is recommended that the patient have 2-3 sessions per week of Physical Therapy at d/c to increase the patient's independence. Please see assessment section for further patient specific details. If patient discharges prior to next session this note will serve as a discharge summary. Please see below for the latest assessment towards goals. PT Equipment Recommendations  Equipment Needed: Yes  Mobility Devices: Abi Contreras: Rolling      Patient Diagnosis(es): The primary encounter diagnosis was Acute pulmonary edema ([facility] [address] Diagnoses of Pleural effusion, Hypoxia, and Pneumonia due to infectious organism, unspecified laterality, unspecified part of lung were also pertinent to this visit. Past Medical History:  has a past medical history of htn, IVDU (intravenous drug user), and PE (pulmonary thromboembolism) ([address] Past Surgical History:  has a past surgical history that includes bronchoscopy (N/A, 2/10/2023) and bronchoscopy (N/A, 2/10/2023). Assessment   Body Structures, Functions, Activity Limitations Requiring Skilled Therapeutic Intervention: Decreased functional mobility   Assessment: Pt functioning below baseline at this time. Anticipate good progress & that pt will likely go home upon D/C. Recommend 24-hr assist initially (pt can go to mom's house) & RW for home. Pt would benefit from outpt pulmonary rehab. Will cont PT while here to maximize independence.   Treatment Diagnosis: decreased mobility  Therapy Prognosis: Good  Decision Making: Low Complexity  Requires PT Follow-Up: Yes  Activity Tolerance  Activity Tolerance: Patient tolerated evaluation without incident;Patient limited by pain; Patient limited by endurance     Plan   Physcial Therapy Plan  General Plan:  (2-5)  Current Treatment Recommendations: Strengthening, Transfer training, Gait training, Stair training, Endurance training, Safety education & training, Equipment evaluation, education, & procurement, Therapeutic activities  Safety Devices  Type of Devices: Left in chair, Call light within reach, Chair alarm in place, Nurse notified     Restrictions  Position Activity Restriction  Other position/activity restrictions: up with assistance     Subjective   General  Chart Reviewed: Yes  Patient assessed for rehabilitation services?: Yes  Additional Pertinent Hx: Patient is a 56 y.o. male with a PMHx of PE on Eliquis, IVDU, hep C, COPD, hypertension presented to the ED with chief complaint of shortness of breath. Family / Caregiver Present: No  Referring Practitioner: Tera  Referral Date : 02/12/23  Diagnosis: Large complex loculated right pleural fluid  Follows Commands: Within Functional Limits  Subjective  Subjective: Pt supine in bed & agreeable to PT.          Social/Functional History  Social/Functional History  Lives With: Significant other (girlfriend)  Type of Home: Apartment  Home Layout: One level  Home Access: Stairs to enter with rails  Entrance Stairs - Number of Steps: 8  Bathroom Shower/Tub: Tub/Shower unit (sponge bathing for past ~2 weeks)  Bathroom Toilet: Standard (next to sink)  Has the patient had two or more falls in the past year or any fall with injury in the past year?: No  ADL Assistance: Independent (girlfriend assisting recently (~2 weeks) d/t SOB)  Homemaking Assistance:  (girlfriend does all)  Homemaking Responsibilities: No  Ambulation Assistance: Independent  Transfer Assistance: Independent  Active : Yes (not for past few weeks)  Occupation: Unemployed  Additional Comments: girlfriend works full-time. Reporting he can live with his mom for a few days and she can provide 24 hr assistance. Vision/Hearing  Vision  Vision: Within Functional Limits  Hearing  Hearing: Within functional limits    Cognition   Orientation  Overall Orientation Status: Within Normal Limits     Objective   Heart Rate: 94  Heart Rate Source: Monitor  BP: 125/82  BP Location: Right upper arm  BP Method: Manual  Patient Position: Turns self  MAP (Calculated): 96  Resp: 17  SpO2: (!) 89 %  O2 Device: Nasal cannula              AROM RLE (degrees)  RLE AROM: WNL  AROM LLE (degrees)  LLE AROM : WNL  Strength RLE  Strength RLE: WFL  Strength LLE  Strength LLE: WFL        Bed Mobility Training  Bed Mobility Training: Yes  Supine to Sit: Supervision (HOB elevated)  Scooting: Supervision (seated)  Balance  Sitting: Intact  Standing:  (static: SBA; dynamic: CGA with RW)  Transfer Training  Transfer Training: Yes  Sit to Stand: Contact-guard assistance (from bed & chair)  Stand to Sit: Contact-guard assistance  Gait Training  Gait Training: Yes  Gait  Overall Level of Assistance: Contact-guard assistance (pt amb 15 ft, 50 ft with RW & CGA. slow corky, bilat LEs shaky, pt SOB. distance limited by fatigue/SOB. pt on 2 L at rest, 3 L O2 while ambulating (per RN request). O2 sat remained in 90's. )                                                                      AM-PAC Score  AM-PAC Inpatient Mobility Raw Score : 18 (02/13/23 1525)  AM-PAC Inpatient T-Scale Score : 43.63 (02/13/23 1525)  Mobility Inpatient CMS 0-100% Score: 46.58 (02/13/23 1525)  Mobility Inpatient CMS G-Code Modifier : CK (02/13/23 1525)           Goals  Short Term Goals  Time Frame for Short Term Goals: D/C  Short Term Goal 1: sit<->stand SUPV  Short Term Goal 2: Amb 150 ft with RW SBA  Short Term Goal 3: pt will tolerate stair assessment  Patient Goals   Patient Goals : to go home       Education  Patient Education  Education Given To: Patient  Education Provided: Role of Therapy;Plan of Care;Transfer Training;Energy Conservation  Education Method: Verbal  Education Outcome: Verbalized understanding      Therapy Time   Individual Concurrent Group Co-treatment   Time In 1417         Time Out 1500         Minutes 9 [facility],

## 2023-02-13 NOTE — ANESTHESIA POSTPROCEDURE EVALUATION
Department of Anesthesiology  Postprocedure Note    Patient: Daksha Boyce  MRN: [id number]  YOB: 1975  Date of evaluation: 2/13/2023      Procedure Summary     Date: 02/10/23 Room / Location: [address] [place] [address] Mihai Ramirez 01 / [facility]    Anesthesia Start: 1114 Anesthesia Stop: 1231    Procedures:       BRONCHOSCOPY ALVEOLAR LAVAGE      BRONCHOSCOPY/TRANSBRONCHIAL LUNG BIOPSY Diagnosis:       Pneumonia due to infectious organism, unspecified laterality, unspecified part of lung      (pneumonia)    Surgeons: Guanakito Montano MD Responsible Provider: Alina Davey MD    Anesthesia Type: general ASA Status: 4          Anesthesia Type: No value filed.     Juaquin Phase I: Juaquin Score: 9    Juaquin Phase II:        Anesthesia Post Evaluation    Patient location during evaluation: PACU  Patient participation: complete - patient participated  Level of consciousness: awake and alert  Airway patency: patent  Nausea & Vomiting: no nausea and no vomiting  Complications: no  Cardiovascular status: hemodynamically stable  Respiratory status: acceptable  Hydration status: euvolemic  Multimodal analgesia pain management approach

## 2023-02-13 NOTE — CONSULTS
Clinical Pharmacy Progress Note    Vancomycin has been discontinued. Pharmacy will sign off. Please re-consult pharmacy if vancomycin dosing is wanted in the future.     Please call with questions--  Victor Hugo Maurer PharmD, [facility]  Wireless: [phone]   2/13/2023 11:34 AM

## 2023-02-14 ENCOUNTER — APPOINTMENT (OUTPATIENT)
Dept: GENERAL RADIOLOGY | Age: 48
DRG: 166 | End: 2023-02-14
Payer: MEDICAID

## 2023-02-14 LAB
ANION GAP SERPL CALCULATED.3IONS-SCNC: 9 MMOL/L (ref 3–16)
BASOPHILS ABSOLUTE: 0 K/UL (ref 0–0.2)
BASOPHILS RELATIVE PERCENT: 0 %
BUN BLDV-MCNC: 16 MG/DL (ref 7–20)
CALCIUM SERPL-MCNC: 6.6 MG/DL (ref 8.3–10.6)
CHLORIDE BLD-SCNC: 100 MMOL/L (ref 99–110)
CO2: 28 MMOL/L (ref 21–32)
CREAT SERPL-MCNC: 2.9 MG/DL (ref 0.9–1.3)
CREATININE URINE: 130.6 MG/DL (ref 39–259)
EOSINOPHILS ABSOLUTE: 0.1 K/UL (ref 0–0.6)
EOSINOPHILS RELATIVE PERCENT: 1 %
GFR SERPL CREATININE-BSD FRML MDRD: 26 ML/MIN/{1.73_M2}
GLUCOSE BLD-MCNC: 87 MG/DL (ref 70–99)
HCT VFR BLD CALC: 23.5 % (ref 40.5–52.5)
HEMOGLOBIN: 7.6 G/DL (ref 13.5–17.5)
HYPERSEGMENTED NEUTROPHILS: PRESENT
LYMPHOCYTES ABSOLUTE: 0.7 K/UL (ref 1–5.1)
LYMPHOCYTES RELATIVE PERCENT: 10 %
MCH RBC QN AUTO: 32.3 PG (ref 26–34)
MCHC RBC AUTO-ENTMCNC: 32.3 G/DL (ref 31–36)
MCV RBC AUTO: 99.8 FL (ref 80–100)
MONOCYTES ABSOLUTE: 0.3 K/UL (ref 0–1.3)
MONOCYTES RELATIVE PERCENT: 5 %
MYELOCYTE PERCENT: 1 %
NEUTROPHILS ABSOLUTE: 5.7 K/UL (ref 1.7–7.7)
NEUTROPHILS RELATIVE PERCENT: 83 %
PDW BLD-RTO: 18.5 % (ref 12.4–15.4)
PLATELET # BLD: 185 K/UL (ref 135–450)
PLATELET SLIDE REVIEW: ADEQUATE
PMV BLD AUTO: 10 FL (ref 5–10.5)
POTASSIUM SERPL-SCNC: 5 MMOL/L (ref 3.5–5.1)
RBC # BLD: 2.35 M/UL (ref 4.2–5.9)
RBC # BLD: NORMAL 10*6/UL
SLIDE REVIEW: ABNORMAL
SODIUM BLD-SCNC: 137 MMOL/L (ref 136–145)
VANCOMYCIN RANDOM: 29.8 UG/ML
WBC # BLD: 6.8 K/UL (ref 4–11)

## 2023-02-14 PROCEDURE — 6370000000 HC RX 637 (ALT 250 FOR IP): Performed by: STUDENT IN AN ORGANIZED HEALTH CARE EDUCATION/TRAINING PROGRAM

## 2023-02-14 PROCEDURE — 97110 THERAPEUTIC EXERCISES: CPT

## 2023-02-14 PROCEDURE — 6360000002 HC RX W HCPCS: Performed by: STUDENT IN AN ORGANIZED HEALTH CARE EDUCATION/TRAINING PROGRAM

## 2023-02-14 PROCEDURE — 99232 SBSQ HOSP IP/OBS MODERATE 35: CPT | Performed by: INTERNAL MEDICINE

## 2023-02-14 PROCEDURE — 80202 ASSAY OF VANCOMYCIN: CPT

## 2023-02-14 PROCEDURE — 71045 X-RAY EXAM CHEST 1 VIEW: CPT

## 2023-02-14 PROCEDURE — 6370000000 HC RX 637 (ALT 250 FOR IP): Performed by: INTERNAL MEDICINE

## 2023-02-14 PROCEDURE — 2580000003 HC RX 258: Performed by: INTERNAL MEDICINE

## 2023-02-14 PROCEDURE — 97530 THERAPEUTIC ACTIVITIES: CPT

## 2023-02-14 PROCEDURE — 36415 COLL VENOUS BLD VENIPUNCTURE: CPT

## 2023-02-14 PROCEDURE — 2060000000 HC ICU INTERMEDIATE R&B

## 2023-02-14 PROCEDURE — 97116 GAIT TRAINING THERAPY: CPT

## 2023-02-14 PROCEDURE — 87641 MR-STAPH DNA AMP PROBE: CPT

## 2023-02-14 PROCEDURE — 6360000002 HC RX W HCPCS: Performed by: INTERNAL MEDICINE

## 2023-02-14 PROCEDURE — 85025 COMPLETE CBC W/AUTO DIFF WBC: CPT

## 2023-02-14 PROCEDURE — 99233 SBSQ HOSP IP/OBS HIGH 50: CPT | Performed by: INTERNAL MEDICINE

## 2023-02-14 PROCEDURE — 80048 BASIC METABOLIC PNL TOTAL CA: CPT

## 2023-02-14 RX ADMIN — SODIUM CHLORIDE, PRESERVATIVE FREE 10 ML: 5 INJECTION INTRAVENOUS at 21:19

## 2023-02-14 RX ADMIN — SODIUM CHLORIDE, PRESERVATIVE FREE 10 ML: 5 INJECTION INTRAVENOUS at 09:20

## 2023-02-14 RX ADMIN — HEPARIN SODIUM 5000 UNITS: 5000 INJECTION INTRAVENOUS; SUBCUTANEOUS at 21:19

## 2023-02-14 RX ADMIN — ASPIRIN 81 MG 81 MG: 81 TABLET ORAL at 08:59

## 2023-02-14 RX ADMIN — PIPERACILLIN AND TAZOBACTAM 3375 MG: 3; .375 INJECTION, POWDER, LYOPHILIZED, FOR SOLUTION INTRAVENOUS at 00:10

## 2023-02-14 RX ADMIN — HEPARIN SODIUM 5000 UNITS: 5000 INJECTION INTRAVENOUS; SUBCUTANEOUS at 06:48

## 2023-02-14 RX ADMIN — SODIUM CHLORIDE: 9 INJECTION, SOLUTION INTRAVENOUS at 00:09

## 2023-02-14 RX ADMIN — FOLIC ACID 1 MG: 1 TABLET ORAL at 08:59

## 2023-02-14 RX ADMIN — HEPARIN SODIUM 5000 UNITS: 5000 INJECTION INTRAVENOUS; SUBCUTANEOUS at 15:10

## 2023-02-14 RX ADMIN — PIPERACILLIN AND TAZOBACTAM 3375 MG: 3; .375 INJECTION, POWDER, LYOPHILIZED, FOR SOLUTION INTRAVENOUS at 16:56

## 2023-02-14 RX ADMIN — PIPERACILLIN AND TAZOBACTAM 3375 MG: 3; .375 INJECTION, POWDER, LYOPHILIZED, FOR SOLUTION INTRAVENOUS at 09:20

## 2023-02-14 RX ADMIN — Medication 45 MG: at 09:00

## 2023-02-14 ASSESSMENT — PAIN SCALES - GENERAL
PAINLEVEL_OUTOF10: 0

## 2023-02-14 NOTE — CARE COORDINATION
Case Management Assessment  Initial Evaluation    Date/Time of Evaluation: 2/14/2023 3:51 PM  Assessment Completed by: Caden Nix    If patient is discharged prior to next notation, then this note serves as note for discharge by case management. Patient Name: Romel Shah                   YOB: 1975  Diagnosis: Acute pulmonary edema ([facility] [address] [J81.0]  Pleural effusion [J90]  Hypoxia [R09.02]  Acute heart failure, unspecified heart failure type [facility]) [I50.9]                   Date / Time: 2/7/2023 11:28 PM    Patient Admission Status: Inpatient   Readmission Risk (Low < 19, Mod (19-27), High > 27): Readmission Risk Score: 16.1    Current PCP: None None  PCP verified by CM? No (none on file- given clinic info)    Chart Reviewed: Yes      History Provided by: Patient  Patient Orientation: Alert and Oriented    Patient Cognition: Alert    Hospitalization in the last 30 days (Readmission):  No    If yes, Readmission Assessment in CM Navigator will be completed. Advance Directives:      Code Status: Full Code   Patient's Primary Decision Maker is: Patient Declined (Legal Next of Kin Remains as Decision Maker)      Discharge Planning:    Patient lives with: Spouse/Significant Other Type of Home: Apartment  Primary Care Giver: Self  Patient Support Systems include: Spouse/Significant Other, Family Members   Current Financial resources: Medicaid  Current community resources: None  Current services prior to admission: None            Current DME:              Type of Home Care services:  OT, PT, Nursing Services    ADLS  Prior functional level: Independent in ADLs/IADLs  Current functional level: Assistance with the following:, Cooking, Housework, Shopping, Mobility    PT AM-PAC: 18 /24  OT AM-PAC: 19 /24    Family can provide assistance at DC: Yes  Would you like Case Management to discuss the discharge plan with any other family members/significant others, and if so, who?  No  Plans to Return to Present Housing: Yes  Other Identified Issues/Barriers to RETURNING to current housing: none  Potential Assistance needed at discharge: [address], Durable Medical Equipment            Potential DME:    Patient expects to discharge to: [address] [place] [address] for transportation at discharge:      Financial    Payor: [employer] / Plan: [employer] [address] / Product Type: *No Product type* /     Does insurance require precert for SNF: Yes    Potential assistance Purchasing Medications: Yes  Meds-to-Beds request: No    No Pharmacies Listed    Notes:    Factors facilitating achievement of predicted outcomes: Motivated, Cooperative, and Pleasant    Barriers to discharge: Decreased endurance, Lower extremity weakness, Medical complications, and Medication managment    Additional Case Management Notes:     CM spoke with pt at bedside, pt lives with girlfriend in apartment. Pt agreeable to DC home with [facility] at this time. CM will continue to follow for discharge planning. The Plan for Transition of Care is related to the following treatment goals of Acute pulmonary edema (HCC) [J81.0]  Pleural effusion [J90]  Hypoxia [R09.02]  Acute heart failure, unspecified heart failure type ([address] [H85.9]    IF APPLICABLE: The Patient and/or patient representative Fredy Franks and his family were provided with a choice of provider and agrees with the discharge plan. Freedom of choice list with basic dialogue that supports the patient's individualized plan of care/goals and shares the quality data associated with the providers was provided to: Patient   Patient Representative Name:       The Patient and/or Patient Representative Agree with the Discharge Plan?  Yes    Navid Alvarez  Case Management Department  Ph: [phone]

## 2023-02-14 NOTE — PROGRESS NOTES
[facility]    [facility]              [phone]                       Plan :     BP is well controlled  Urine is 1200 ml   Creatinine is stabilizing, 1.0> 1.7 > 2.5 > 2.9       - Agree with changing Vancomycin to Linezolid  -  DC IVF  -  DC K supplements   - Strict I's and O's  - Avoid nephrotoxic medications  - Monitor labwork       Assessment :     Otis Chaudhary is a 56 y.o. male with hx of HTN, DVT, PE, treated Hep C, and COPD on HOD#5 for SOB secondary to right pleural collection (suspected hemothorax). Our services were consulted for ATN. KARINA stage 2, suspected ischemic ATN with superimposed Vancomycin ATN  - Pt with signs of ATN today given acute kidney injury class II with etiologies supporting ATN, ischemic vs. Nephrotoxic. Given the history of intermittent hypotension (now normal) and elevated serum vancomycin level (30.5 on 2/13) preceding the creatinine increase, we suspect ATN due to a combination of these causes. As patient has started to urinate again, we suspect gradual improvement and return to function of the kidneys with normal blood pressure and removal of vancomycin  - Discontinue vancomycin; continue Linezolid as ordered by primary team  - Continue blood pressure control   - continue to hold fluids given normal BP today   - Hold BP medications (carvedilol, valsartan)  - Continue to trend labs    Hypotension (resolved)  Intermittent on admission, but well-controlled today.    - continue to hold fluids given normal BP today  - Hold BP medications (carvedilol, valsartan)  - [facility] would like to thank Ximena Brandt MD   for opportunity to serve this patient      Please call with questions at-   24 Hrs Answering service [phone] or  7 am- 5 pm via Perfect serve or cell phone  Maria Dolores Whitehead MD        CC/reason for consult :     ATN     HPI :     Otis Chaudhary is a 56 y.o. male with history of HTN, DVT, PE, treated Hep C, and COPD who presented to the hospital on 2/7/2023 with shortness of breath. On CT chest on 2/8, showed large right pleural fluid collection with suspected hemothorax  secondary to suspected trauma; chest tube was placed on 2/8 with drainage of sanguinous fluid. Pleural fluid analysis showed , total protein of 4.3, glucose 20 suggesting exudative pleural effusion/empyema or hemothorax. Given collection, patient was started on Zosyn (ongoing until 2/16) and vancomycin on 2/9 (completed on 2/12). Patient afebrile, normocardic, and with normal WBC through hospitalization, but with intermittent hypotension as low as the 97-29N systolic. For the past several days, the patient's creatinine has been elevating (2.5 today from 1.0 on 2/10) leading to consultation of Nephrology for concerns of ATN. Interval History:     Patient reports overall doing well with improving SOB. Chest tube continuing to drain small amounts of sanguinous fluid. Patient reports that he has started to urinate again, albeit it being dark in color. Denies any increases in urinary frequency otherwise or any tenderness with urination. Denies any lower back pain. ROS:     Seen with- Dr. Jacob Crockett MD, Claudie Homans, MS4    positives in bold   Constitutional:  fever, chills, weakness, weight change, fatigue  Skin:  rash, pruritus, hair loss, bruising, dry skin, petechiae  Head, Face, Neck   headaches, swelling,  cervical adenopathy  Respiratory: shortness of breath, cough, or wheezing  Cardiovascular: chest pain, palpitations, dizzy, edema  Gastrointestinal: nausea, vomiting, diarrhea, constipation,belly pain    Yellow skin, blood in stool  Musculoskeletal:  back pain, muscle weakness, gait problems,       joint pain, swelling of the legs  Genitourinary:  dysuria, poor urine flow, flank pain, dark urine  Neurologic:  vertigo, TIA'S, syncope, seizures, focal weakness  Psychosocial:  insomnia, anxiety, or depression.   Additional positive findings: All other remaining systems are negative. PMH/PSH/SH/Family History:     Past Medical History:   Diagnosis Date    htn     IVDU (intravenous drug user)     PE (pulmonary thromboembolism) ([facility] [address]        Past Surgical History:   Procedure Laterality Date    BRONCHOSCOPY N/A 2/10/2023    BRONCHOSCOPY ALVEOLAR LAVAGE performed by Girma Burch MD at [facility] N/A 2/10/2023    BRONCHOSCOPY/TRANSBRONCHIAL LUNG BIOPSY performed by Girma Burch MD at [facility] History     Socioeconomic History    Marital status: Single     Spouse name: Not on file    Number of children: Not on file    Years of education: Not on file    Highest education level: Not on file   Occupational History    Not on file   Tobacco Use    Smoking status: Every Day     Packs/day: 0.50     Types: Cigarettes    Smokeless tobacco: Never   Substance and Sexual Activity    Alcohol use: Not Currently    Drug use: Not Currently     Types: Opiates     Sexual activity: Not on file   Other Topics Concern    Not on file   Social History Narrative    Not on file     Social Determinants of Health     Financial Resource Strain: Not on file   Food Insecurity: Not on file   Transportation Needs: Not on file   Physical Activity: Not on file   Stress: Not on file   Social Connections: Not on file   Intimate Partner Violence: Not on file   Housing Stability: Not on file       History reviewed. No pertinent family history.        Medication:     Scheduled Meds:   heparin (porcine)  5,000 Units SubCUTAneous 3 times per day    piperacillin-tazobactam  3,375 mg IntraVENous Q8H    sodium chloride flush  5-40 mL IntraVENous 2 times per day    [Held by provider] valsartan  40 mg Oral 2 times per day    [Held by provider] carvedilol  3.125 mg Oral BID WC    aspirin  81 mg Oral Daily    methadone  45 mg Oral Daily    folic acid  1 mg Oral Daily     Continuous Infusions:   sodium chloride 50 mL/hr at 02/14/23 0009     PRN Meds:.prochlorperazine, sodium chloride flush, sodium chloride, polyethylene glycol, acetaminophen **OR** acetaminophen, potassium chloride **OR** potassium alternative oral replacement **OR** potassium chloride, magnesium sulfate, perflutren lipid microspheres, aluminum & magnesium hydroxide-simethicone       Vitals :     Vitals:    02/14/23 0803   BP: 123/83   Pulse: 79   Resp: 17   Temp: 98.2 °F (36.8 °C)   SpO2: 98%       I & O :       Intake/Output Summary (Last 24 hours) at 2/14/2023 9438  Last data filed at 2/14/2023 5489  Gross per 24 hour   Intake 756.85 ml   Output 615 ml   Net 141.85 ml          Physical Examination :     General appearance: Anxious- no, distressed- no, in good spirits- yes  HEENT: Lips- normal, teeth- ok , oral mucosa- moist  Neck : Mass- no, appears symmetrical, JVD- not visible  Respiratory: Respiratory effort-  normal, wheeze- no, crackles - no. Patient with right chest tube present. Cardiovascular:  Ausculation- No M/R/G, 1+ pitting edema of the bilateral lower extremities and feet  Abdomen: visible mass- no, distention- no, scar- no, tenderness- no                            hepatosplenomegaly-  no  Musculoskeletal:  clubbing no,cyanosis- no , digital ischemia- no                           muscle strength- grossly normal , tone - grossly normal  Skin: rashes- no , ulcers- no, induration- no, tightening - no  Psychiatric:  Judgement and insight- normal           AAO X 3     LABS:     Recent Labs     02/12/23  0721 02/13/23  0520 02/14/23  0608   WBC 7.6 7.3 6.8   HGB 7.7* 7.5* 7.6*   HCT 24.0* 23.5* 23.5*    177 185       Recent Labs     02/12/23  0721 02/13/23  0520 02/14/23  0608   * 136 137   K 4.1 4.4 5.0   CL 98* 100 100   CO2 30 30 28   BUN 16 16 16   CREATININE 1.7* 2.5* 2.9*   GLUCOSE 82 79 87   MG 1.90  --   --           Will discuss with Dr. Carlos Lora MD  Internal Medicine, PGY3    [facility]  Internal Medicine, MS4        Patient was seen and examined and the case was discussed with the resident. He acted as my scribe. I agree with the assessment and plan.    Thanks  Nephrology  [address] [place] [address] [id number]  [place] [address]  Office: [phone]  Cell: [phone]  Fax: [phone]

## 2023-02-14 NOTE — PROGRESS NOTES
Pulmonary Followup Note    CC: Pneumonitis    Interval History:    No significant issues today. Feels better than yesterday. Reports he felt okay working with PTOT, still endorses ROWLAND. Remains at 2L    Labs do show worsening KARINA up to 2.7, baseline 1. Likely 2/2 hemodynamic changes vs Vanc tox      Chest tube drained 50 cc / 24hr, UOP 1200cc  Net fluid balance -ve 500cc/24hr    Remains on Zoysn       heparin (porcine)  5,000 Units SubCUTAneous 3 times per day    piperacillin-tazobactam  3,375 mg IntraVENous Q8H    sodium chloride flush  5-40 mL IntraVENous 2 times per day    [Held by provider] valsartan  40 mg Oral 2 times per day    [Held by provider] carvedilol  3.125 mg Oral BID WC    aspirin  81 mg Oral Daily    methadone  45 mg Oral Daily    folic acid  1 mg Oral Daily           PHYSICAL EXAMINATION:  BP (!) 127/91   Pulse 85   Temp 98 °F (36.7 °C) (Oral)   Resp 18   Ht 5' 10\" (1.778 m)   Wt 226 lb 6.6 oz (102.7 kg)   SpO2 94%   BMI 32.49 kg/m²   CURRENT PULSE OXIMETRY:  SpO2: 94 %  24HR PULSE OXIMETRY RANGE:  SpO2  Av.9 %  Min: 89 %  Max: 98 % on 2LNC      Gen: No acute distress. Speaking in full sentences with no issue at rest.   HEENT: PERRL, EOMI, OP nl  Lung: Diminished breath sounds, minimal wheezing  Chest: Chest tube in place, no surrounding erythema  CV: RRR without M/R/R  Abd: +BS, soft, NT/ND  Ext: No edema. DATA  CBC:   Recent Labs     23  0721 23  0520 23  0608   WBC 7.6 7.3 6.8   HGB 7.7* 7.5* 7.6*   HCT 24.0* 23.5* 23.5*   .4* 100.3* 99.8    177 185       BMP:   Recent Labs     23  0721 23  0520 23  0608   * 136 137   K 4.1 4.4 5.0   CL 98* 100 100   CO2 30 30 28   BUN 16 16 16   CREATININE 1.7* 2.5* 2.9*       No results for input(s): PHART, BUW9ACB, PO2ART in the last 72 hours.   LIVER PROFILE: No results for input(s): AST, ALT, LIPASE, BILIDIR, BILITOT, ALKPHOS in the last 72 hours.    Invalid input(s): AMYLASE,  ALB    CXR REVIEWED BY ME AND SHOWED:  XR CHEST PORTABLE   Final Result      Stable appearance of the chest.      XR CHEST PORTABLE   Final Result   1. Slight improvement in multifocal airspace disease. 2.  No significant change in the right pleural effusion. XR CHEST PORTABLE   Final Result   1. No change. XR CHEST PORTABLE   Final Result   1. Persistent right effusion and right lower lung airspace disease without change from prior. 2. Extensive left-sided airspace disease stable to prior. FLUORO FOR SURGICAL PROCEDURES   Final Result      1. Intraprocedure fluoroscopy was provided. XR CHEST 1 VIEW   Final Result      1. Intraprocedure fluoroscopy was provided. XR CHEST PORTABLE   Final Result      1. No change in appearance of bilateral airspace disease. 2.  Right inferior thoracic pigtail catheter likely pleural drain with small to moderate right pleural effusion unchanged. XR CHEST PORTABLE   Final Result   1. As above. XR CHEST PORTABLE   Final Result      1. Stable appearance of the chest with residual right pleural effusion with right chest tube in place and trace associated right basilar pleural gas. 2.  Extensive bilateral airspace disease in the left greater than right lungs. XR CHEST PORTABLE   Final Result      1. Slightly decreased loculated right pleural effusion status post right chest tube placement. There is a small amount of associated right pleural gas. 2.  Persistent diffuse airspace disease in the left lung and right mid and lower lung airspace disease. CT CHEST PULMONARY EMBOLISM W CONTRAST   Final Result      Linear, eccentric, and left leg filling defects in the right lower lobe segmental and subsegmental pulmonary arteries are suggestive of chronic pulmonary emboli. No definite acute pulmonary emboli identified.       Extensive groundglass opacification of both lungs is suspicious for atypical pneumonia although a component of asymmetric pulmonary edema could have a similar appearance. Large complex loculated right pleural fluid collection. Recommend diagnostic thoracentesis, as hemothorax or malignant effusion cannot be excluded. Trace left pleural effusion. INCIDENTAL FINDINGS: None. XR CHEST PORTABLE   Final Result      Extensive bilateral airspace disease, which may represent pneumonia or pulmonary edema. Cardiomegaly. Moderate to large right and questionable small left pleural effusions. XR CHEST PORTABLE    (Results Pending)          ASSESSMENT/PLAN:    Right hemothorax  Diffuse alveolar opacities of uncertain etiology  - Cultures remaining negative  - awaiting BAL and transbronchial biopsy results  - repeat BNP improved  - on Zosyn (2/9) - Cx NGTD    Patient has been discussed and staffed with Dr. Arline Jara MD    Patient examined, findings as discussed with Dr. Abby Garnett. Agree with assessment and plan. Chest tube output is low, and we will remove the tube if unchanged in the next 24 hours. Continuing empiric antibiotic therapy, pending results of transbronchial lung biopsy.

## 2023-02-14 NOTE — PROGRESS NOTES
[facility] Daily Progress Note      Admit Date:  2/7/2023    Subjective:  Mr. Michel Kim was seen and examined. F/U CHF. Breathing about same. No chest pain.      Objective:   BP (!) 127/91   Pulse 85   Temp 98 °F (36.7 °C) (Oral)   Resp 18   Ht 5' 10\" (1.778 m)   Wt 226 lb 6.6 oz (102.7 kg)   SpO2 94%   BMI 32.49 kg/m²     Intake/Output Summary (Last 24 hours) at 2/14/2023 1431  Last data filed at 2/14/2023 1130  Gross per 24 hour   Intake 736.85 ml   Output 615 ml   Net 121.85 ml       TELEMETRY: Sinus     Physical Exam:  General:  Awake, alert, NAD  Skin:  Warm and dry  Neck:  JVP difficult  Chest:  decreased BS,crackles, respiration normal at rest.  Cardiovascular:  RRR S1S2  Abdomen:  Soft nontender  Extremities:  no edema    Medications:    heparin (porcine)  5,000 Units SubCUTAneous 3 times per day    piperacillin-tazobactam  3,375 mg IntraVENous Q8H    sodium chloride flush  5-40 mL IntraVENous 2 times per day    [Held by provider] valsartan  40 mg Oral 2 times per day    [Held by provider] carvedilol  3.125 mg Oral BID WC    aspirin  81 mg Oral Daily    methadone  45 mg Oral Daily    folic acid  1 mg Oral Daily      sodium chloride 50 mL/hr at 02/14/23 0009     prochlorperazine, sodium chloride flush, sodium chloride, polyethylene glycol, acetaminophen **OR** acetaminophen, potassium chloride **OR** potassium alternative oral replacement **OR** potassium chloride, magnesium sulfate, perflutren lipid microspheres, aluminum & magnesium hydroxide-simethicone    Lab Data:  CBC:   Recent Labs     02/12/23  0721 02/13/23  0520 02/14/23  0608   WBC 7.6 7.3 6.8   HGB 7.7* 7.5* 7.6*   HCT 24.0* 23.5* 23.5*   .4* 100.3* 99.8    177 185     BMP:   Recent Labs     02/12/23  0721 02/13/23  0520 02/14/23  0608   * 136 137   K 4.1 4.4 5.0   CL 98* 100 100   CO2 30 30 28   BUN 16 16 16   CREATININE 1.7* 2.5* 2.9*     LIVER PROFILE: No results for input(s): AST, ALT, LIPASE, BILIDIR, BILITOT, ALKPHOS in the last 72 hours. Invalid input(s): AMYLASE,  ALB  PT/INR:   No results for input(s): PROTIME, INR in the last 72 hours. APTT: No results for input(s): APTT in the last 72 hours. BNP:  No results for input(s): BNP in the last 72 hours. IMAGING:     Assessment:  Patient Active Problem List    Diagnosis Date Noted    Hypoxemia 02/11/2023    Acute heart failure, unspecified heart failure type ([facility] [address] 02/08/2023    Normocytic anemia 02/08/2023    Chronic anticoagulation 02/08/2023    HTN (hypertension) 02/08/2023    Opioid dependence on agonist therapy ([facility] [address] 02/08/2023    Hx pulmonary embolism 02/08/2023    Acute pulmonary edema ([facility] [address] 02/08/2023    Hemothorax on right 02/08/2023    Pleural effusion 02/08/2023       Plan:  Breathing  some better. O2 requirements some better. Coreg/diovan on hold, bp marginal. Cr stable. CT in place. Output decreasing. Cytology pending. Check BNP. - fluid balance. Pulmonary following.        Core Measures:  Discharge instructions:   LVEF documented:   ACEI for LV dysfunction:   Smoking Cessation:    Reyna Miguel MD, MD 2/14/2023 2:31 PM

## 2023-02-14 NOTE — PROGRESS NOTES
Physical Therapy  Facility/Department: [facility] [address] [facility]  Daily Treatment Note  NAME: Andre Esteves  : 1975  MRN: [id number]    Date of Service: 2023    Discharge Recommendations: Andre Esteves scored a 18/24 on the AM-PAC short mobility form. Current research shows that an AM-PAC score of 18 or greater is typically associated with a discharge to the patient's home setting. Based on the patient's AM-PAC score and their current functional mobility deficits, it is recommended that the patient have 2-3 sessions per week of Physical Therapy at d/c to increase the patient's independence. At this time, this patient demonstrates the endurance and safety to discharge home with home PT and a follow up treatment frequency of 2-3x/wk. Please see assessment section for further patient specific details. If patient discharges prior to next session this note will serve as a discharge summary. Please see below for the latest assessment towards goals. PT Equipment Recommendations  Equipment Needed: Yes  Mobility Devices: Nanine Shad: Rolling    Patient Diagnosis(es): The primary encounter diagnosis was Acute pulmonary edema ([address] Diagnoses of Pleural effusion, Hypoxia, and Pneumonia due to infectious organism, unspecified laterality, unspecified part of lung were also pertinent to this visit. Assessment   Assessment: pt tolerated session fair limited by motivation and poor endurance. pt agreeable to ambulaltion in the walker and LE exercises however very fatigued after one ambulation trial and declined further. pt performing mobility with CGA- SBA at 4WW. pt plans to return home at [place] and will benefit from initial 24hr A, HHPT, and RW at [place]. Continue PT POC  Activity Tolerance: Patient limited by endurance; Patient limited by fatigue  Equipment Needed: Yes  Mobility Devices: [address] [place] [address]    Physcial Therapy Plan  General Plan:  (2-5)  Current Treatment Recommendations: Strengthening;Transfer training;Gait training;Stair training; Endurance training; Safety education & training;Equipment evaluation, education, & procurement; Therapeutic activities     Restrictions  Position Activity Restriction  Other position/activity restrictions: up with assistance     Subjective    Subjective  Subjective: pt up in chair and agreeable to PT  Pain: denies pain  Orientation  Overall Orientation Status: Within Normal Limits  Cognition  Overall Cognitive Status: WFL     Objective   Vitals     Bed Mobility Training  Bed Mobility Training: No  Balance  Sitting: Intact (in supported chair)  Standing: High guard (SBA at 1JT)  Transfer Training  Transfer Training: Yes  Sit to Stand: Stand-by assistance (from recliner at 4WW)  Stand to Sit: Stand-by assistance  Gait Training  Gait Training: Yes (on 2Lo2 throughout and hooked up to continuous suction- no SOB noted however pt fatigued at end of ambulation trial and declining further)  Gait  Overall Level of Assistance: Contact-guard assistance  Interventions: Safety awareness training;Verbal cues  Base of Support: Narrowed  Speed/Farrah: Slow;Shuffled  Step Length: Right shortened;Left shortened  Gait Abnormalities: Decreased step clearance  Distance (ft): 100 Feet (unable to tolerate further)  Assistive Device: Gait belt;Walker, rollator     PT Exercises  Exercise Treatment: pt performed seated LE exercises in form of heel raises, LAQ, and marches x10 reps     Safety Devices  Type of Devices: Left in chair;Call light within reach; Chair alarm in place;Nurse notified;Gait belt       Goals  Short Term Goals  Time Frame for Short Term Goals: D/C  Short Term Goal 1: sit<->stand SUPV  Short Term Goal 2: Amb 150 ft with RW SBA  Short Term Goal 3: pt will tolerate stair assessment  Patient Goals   Patient Goals : to go home    Education  Patient Education  Education Given To: Patient  Education Provided: Role of Therapy;Plan of Care;Transfer Training;Energy Conservation  Education Method: Verbal  Education Outcome: Verbalized understanding    Therapy Time   Individual Concurrent Group Co-treatment   Time In 1330         Time Out 1409         Minutes Sri PT

## 2023-02-14 NOTE — PLAN OF CARE
PHYSICAL THERAPY TREATMENT:    Date:  4/21/2018    Patient is a 55 year old male admitted to [facility] for right sided weakness and HTN, was found to have acute renal failure as well. Head CT (+) for right basal ganglia hemorrhagic infarct. Pt lives at home with his wife in a mobile home, 4 steps to enter.     Visit # since seen by PT:  2  ASSESSMENT:   Patient is displaying fair progress as evidenced by continues to require max x 2  assist for sit to supine, once sitting followed directions to assist with L hand on bed frame.  Moments of ability to sit max x 1 with hands on lap. Total time EOB was 15 minutes. Was able to extend L knee but right LE is showing increased tone. Patient also able to perform head movements when directed.    At this time the patient continues to demonstrate deficits as noted on evaluation/re-evaluation.  Further skilled physical therapy is required to address these limitations in order to maximize the patient's independence.    After today's session, this therapist is recommending the following location for optimal discharge:    Recommendation for Discharge: PT: Sub-acute nursing home  Recommendations for Discharge: OT: Sub-acute nursing home  Recommendations for Discharge: SLP: Post-acute therapy    Equipment for discharge: to be determined - continuing to assess needs at this time     Focus of today's therapy session:   bed mobility, sitting balance, lateral wt-shifting, co - treat with OT  See below for further details.    Treatment Plan for Next Session:   bed mobility, transfers, therapeutic exercise, balance training , neuromuscular re-education, weight shifting and sitting blanace  at EOB    Precautions: Fall. SBP <160  Activity orders: As tolerated  Torres Fall Scale Score: 95  Alarms: wife present  Basic Lines: Capped IV, Gallagher and Telemetry  Collaboration with: OT, RN and Cotx with OT    SUBJECTIVE:   Patient reports agreeable to therapy and agreeable to having spouse present for  Problem: Discharge Planning  Goal: Discharge to home or other facility with appropriate resources  Outcome: Progressing  Flowsheets (Taken 2/14/2023 1602)  Discharge to home or other facility with appropriate resources:   Identify barriers to discharge with patient and caregiver   Arrange for needed discharge resources and transportation as appropriate   Identify discharge learning needs (meds, wound care, etc)   Refer to discharge planning if patient needs post-hospital services based on physician order or complex needs related to functional status, cognitive ability or social support system     Problem: Safety - Adult  Goal: Free from fall injury  Outcome: Progressing  Flowsheets (Taken 2/14/2023 1602)  Free From Fall Injury:   Instruct family/caregiver on patient safety   Based on caregiver fall risk screen, instruct family/caregiver to ask for assistance with transferring infant if caregiver noted to have fall risk factors     Problem: Respiratory - Adult  Goal: Achieves optimal ventilation and oxygenation  Outcome: Progressing  Flowsheets (Taken 2/14/2023 1602)  Achieves optimal ventilation and oxygenation:   Assess for changes in respiratory status   Assess for changes in mentation and behavior   Oxygen supplementation based on oxygen saturation or arterial blood gases   Assess and instruct to report shortness of breath or any respiratory difficulty   Respiratory therapy support as indicated  Note: No complications noted to right chest tube, no crepitus noted, line patent, dressing clean, dry & intact. Spo2 >90% on 2L O2 via NC.       Problem: Cardiovascular - Adult  Goal: Maintains optimal cardiac output and hemodynamic stability  Outcome: Progressing  Flowsheets (Taken 2/14/2023 1602)  Maintains optimal cardiac output and hemodynamic stability:   Monitor blood pressure and heart rate   Assess for signs of decreased cardiac output   Administer fluid and/or volume expanders as ordered     Problem: Pain  Goal: Verbalizes/displays adequate comfort level or baseline comfort level  Outcome: Progressing  Flowsheets (Taken 2/14/2023 1604)  Verbalizes/displays adequate comfort level or baseline comfort level:   Encourage patient to monitor pain and request assistance   Assess pain using appropriate pain scale session       Cognition: expressive aphasia    Pain:  Comfort/Function (Pain) SCORE with Activity:  (sitting EOB patient states his ' crotch\" hurts) (04/21/18 8316)reported back pain, but did not rate     MOBILITY/EXERCISE:    Bed mobility:   Supine to Sit:  of 2, Total Assistance  Reason for Assistance:  requires increased time to complete, safety due to Rt side weakness, weakness, body weight support due to weakness, verbal cues for technique    Transfers:   Steffany transfer with assist x 3 bed > chair    Balance:  Static sitting: Maximal Assistance (Max)  Dynamic sitting: Maximal Assistance (Max)  Static standing:  not attempted today  Dynamic standing: Not Attempted due to safety concerns  Sitting EOB x 10 min with max assist, lateral wt-shifting onto elbows x 2.  Encouraging upright posture in midline.    Exercises:  Not addressed this session     OBSERVATION:   Vital Signs:   Vitals stable throughout therapy session.    Activity Tolerance:   limited by fatigue, weakness     EDUCATION:    Learner(s): patient and patient's spouse  Education topics covered this session:  Bed Mobility, Transfers, Safety Awareness and HEP/Exercises  Please refer to patient education record for further information regarding patient's learning assessment.     GOALS:     Review Date: 4/25/2018  1. Patient will complete bed mobility with Minimal Assistance (Min).  2. Patient will tolerate sitting EOB with feet supported x 10 minutes SBA in order to progress functional tasks..  3. Gait and transfer goals will be written then tasks are appropriate to be assessed.     PLAN OF CARE:    PT Frequency: 6 days/week  Duration: LOS  Treatment/Interventions: Functional transfer training, Strengthening, ROM, Endurance training, Patient/Family training, Equipment eval/education, Bed mobility, Gait training, Safety Education, Neuromuscular re-education, Compensatory technique education     PT Time Spent: 24 minutes (04/21/18 0649)

## 2023-02-14 NOTE — PROGRESS NOTES
Patient ambulated up to the chair for 3hrs, worked with physical therapy and walked in hallway about 35ft and returned to room. Continue to encourage ambulation and up to chair and incentive spirometer. Patient continues to decline incentive spirometer through out shift. Educate patient the importance of using the spirometer each time declines.

## 2023-02-14 NOTE — PROGRESS NOTES
Internal Medicine Progress Note    Patient Name: Dario Reza   Patient : 1975   Date: 2023   Admit Date: 2023     CC: Shortness of Breath (Pt complaining of sob x6 days. Was brought in on a NRB and 94%. States he has not been taking his blood thinner as he should)       Subjective     Interval History: Patient seen at bedside this AM.  He denies any cough, chest pain, shortness of breath, abdominal pain changes in bowel movements or urinary pattern. He also denies any nausea, vomiting, fatigue or changes in vision. HPI:Jeffry Villanueva 55 yo M w/ pmhx of chronic PE on Eliquis, IVDU, Hep C, COPD, HTN who presents to the ED with complaints of SOB for the past 6 or 7 days. Pt has had a nonproductive cough as well. Pt denies chest pain, nausea, vomiting, changes in bowel habits. The pt stated that last year he went to the ED for acute onset chest pain and shortness of breath due to a saddle pulmonary embolism. Pt was placed on eliquis. ECHO was done during the same stay and showed an EF of 60-65% and was unable to determine if there was any pulmonary hypertension. Pt was an IVDU and is not using anymore and is taking methadone and is positive for hep C that has been treated. Pt does not look volume overloaded and is not edematous in his extremities. On CT PE, pt has continued chronic pulmonary emboli with no sign of acute PE. There was extensive groundglass opacities of both lungs with more of a pulmonary edema picture. As well as right pleural fluid collection. Cannot exclude hemothorax or malignant effusion. Pt is being admitted for work up for new onset HF as well as diagnostic tap of the pleural fluid. ROS:  As per interval history above.       Objective     Vital Signs:  Patient Vitals for the past 8 hrs:   BP Temp Temp src Pulse Resp SpO2 Weight   23 0618 -- -- -- -- -- -- 226 lb 6.6 oz (102.7 kg)   23 -- -- -- -- -- 96 % --   23 130/86 98 °F (36.7 °C) Oral 77 18 93 % --   02/14/23 0006 -- -- -- -- -- 94 % --   02/13/23 2359 (!) 144/78 97.7 °F (36.5 °C) Oral 76 18 98 % --       Physical Exam:  Physical Exam  Constitutional:       Appearance: He is normal weight. He is ill-appearing. HENT:      Head: Normocephalic and atraumatic. Nose: Nose normal.      Mouth/Throat:      Mouth: Mucous membranes are dry. Eyes:      Pupils: Pupils are equal, round, and reactive to light. Cardiovascular:      Rate and Rhythm: Normal rate and regular rhythm. Pulses: Normal pulses. Heart sounds: Normal heart sounds. Pulmonary:      Effort: Pulmonary effort is normal. No respiratory distress. Breath sounds: Normal breath sounds. Comments: Bilateral rales   Abdominal:      General: Bowel sounds are normal. There is no distension. Palpations: Abdomen is soft. There is no mass. Tenderness: There is no abdominal tenderness. There is no right CVA tenderness, left CVA tenderness, guarding or rebound. Hernia: No hernia is present. Musculoskeletal:      Cervical back: Normal range of motion. Right lower leg: No edema. Left lower leg: No edema. Skin:     Capillary Refill: Capillary refill takes less than 2 seconds. Coloration: Skin is pale. Neurological:      General: No focal deficit present. Mental Status: He is alert and oriented to person, place, and time. Mental status is at baseline. Cranial Nerves: No cranial nerve deficit. Sensory: No sensory deficit. Motor: No weakness.       Coordination: Coordination normal.      Gait: Gait normal.      Deep Tendon Reflexes: Reflexes normal.   Psychiatric:         Mood and Affect: Mood normal.       Intake/Output Summary (Last 24 hours) at 2/14/2023 0724  Last data filed at 2/14/2023 0622  Gross per 24 hour   Intake 756.85 ml   Output 1250 ml   Net -493.15 ml        Medications:   heparin (porcine)  5,000 Units SubCUTAneous 3 times per day piperacillin-tazobactam  3,375 mg IntraVENous Q8H    sodium chloride flush  5-40 mL IntraVENous 2 times per day    [Held by provider] valsartan  40 mg Oral 2 times per day    [Held by provider] carvedilol  3.125 mg Oral BID WC    aspirin  81 mg Oral Daily    methadone  45 mg Oral Daily    folic acid  1 mg Oral Daily       sodium chloride 50 mL/hr at 02/14/23 0009      prochlorperazine, sodium chloride flush, sodium chloride, polyethylene glycol, acetaminophen **OR** acetaminophen, potassium chloride **OR** potassium alternative oral replacement **OR** potassium chloride, magnesium sulfate, perflutren lipid microspheres, aluminum & magnesium hydroxide-simethicone     Labs:  CBC:   Recent Labs     02/12/23  0721 02/13/23  0520 02/14/23  0608   WBC 7.6 7.3 6.8   HGB 7.7* 7.5* 7.6*   HCT 24.0* 23.5* 23.5*    177 185   .4* 100.3* 99.8       Renal:    Recent Labs     02/12/23  0721 02/13/23  0520   * 136   K 4.1 4.4   CL 98* 100   CO2 30 30   BUN 16 16   CREATININE 1.7* 2.5*   GLUCOSE 82 79   CALCIUM 7.9* 8.2*   MG 1.90  --    ANIONGAP 5 6       Hepatic: No results for input(s): AST, ALT, BILITOT, BILIDIR, PROT, LABALBU, ALKPHOS in the last 72 hours. Troponin: Invalid input(s): TROPONIN    Lactic acid: Invalid input(s): LACTICACID    BNP: No results for input(s): BNP in the last 72 hours. Pro-BNP:   Recent Labs     02/12/23 0721   PROBNP 588*       Lipids: No results for input(s): CHOL, TRIG, HDL, LDLCALC, VLDL in the last 72 hours. ABGs:  No results for input(s): PHART, UMC1ZXB, PO2ART, ZFW6LWR, BEART, THGBART, X9QTQGEF, XGT6LTT in the last 72 hours. VBGs: No results for input(s): PHVEN, AQM4EFY, PO2VEN in the last 72 hours. INR: No results for input(s): INR in the last 72 hours. aPTT: No results for input(s): APTT in the last 72 hours. Procalcitonin: No results for input(s): PROCAL in the last 72 hours. CRP: No results for input(s): CRP in the last 72 hours.   ESR: No results for input(s): ESR in the last 72 hours. Radiology:  XR CHEST PORTABLE   Final Result   1. Slight improvement in multifocal airspace disease. 2.  No significant change in the right pleural effusion. XR CHEST PORTABLE   Final Result   1. No change. XR CHEST PORTABLE   Final Result   1. Persistent right effusion and right lower lung airspace disease without change from prior. 2. Extensive left-sided airspace disease stable to prior. FLUORO FOR SURGICAL PROCEDURES   Final Result      1. Intraprocedure fluoroscopy was provided. XR CHEST 1 VIEW   Final Result      1. Intraprocedure fluoroscopy was provided. XR CHEST PORTABLE   Final Result      1. No change in appearance of bilateral airspace disease. 2.  Right inferior thoracic pigtail catheter likely pleural drain with small to moderate right pleural effusion unchanged. XR CHEST PORTABLE   Final Result   1. As above. XR CHEST PORTABLE   Final Result      1. Stable appearance of the chest with residual right pleural effusion with right chest tube in place and trace associated right basilar pleural gas. 2.  Extensive bilateral airspace disease in the left greater than right lungs. XR CHEST PORTABLE   Final Result      1. Slightly decreased loculated right pleural effusion status post right chest tube placement. There is a small amount of associated right pleural gas. 2.  Persistent diffuse airspace disease in the left lung and right mid and lower lung airspace disease. CT CHEST PULMONARY EMBOLISM W CONTRAST   Final Result      Linear, eccentric, and left leg filling defects in the right lower lobe segmental and subsegmental pulmonary arteries are suggestive of chronic pulmonary emboli. No definite acute pulmonary emboli identified. Extensive groundglass opacification of both lungs is suspicious for atypical pneumonia although a component of asymmetric pulmonary edema could have a similar appearance. Large complex loculated right pleural fluid collection. Recommend diagnostic thoracentesis, as hemothorax or malignant effusion cannot be excluded. Trace left pleural effusion. INCIDENTAL FINDINGS: None. XR CHEST PORTABLE   Final Result      Extensive bilateral airspace disease, which may represent pneumonia or pulmonary edema. Cardiomegaly. Moderate to large right and questionable small left pleural effusions. XR CHEST PORTABLE    (Results Pending)   XR CHEST PORTABLE    (Results Pending)       Assessment & Plan   Syeda Beckford is a 56 y.o. male, with PMHx of chronic PE on Eliquis, IVDU, Hep C, COPD, HTN who presents to the ED with complaints of SOB for the past 6 or 7 days. Pt has had a nonproductive cough as well. Acute Hypoxic Respiratory Failure  Likely 2/2 to pleural effusion vs malignancy  Pt has acute onset shortness of breath that began 6-7 days ago. CT PE shows significant pleural effusions on the right side as well a ground glass opacities. BNP 4462.  6/2022 - patient weight is ~240. Patient this admission is ~225 (down 15 lbs)   Patient unimpressive infectious workup (nl WBC, barely elevated pro alba .19, afebrile). Trop <0 .01  New O2 requirement (15 HFNC on admission), now on 2L, Chest tube output total :1412  ECHO: 55%-60% EF, unable to eval diastolic fxn, 41 mmhg   - Cards consulted: Dillon Recs  - Pulmonology consulted dillon recs  -awaiting BAL and transbronchial biopsy cytology and culture results  - Continue Zosyn  - Stop Vanc considering worsening KARINA     Large complex loculated right pleural fluid  Unknown etiology: culture pending  Tap on 2/8 with LD and Protein concerning for exudative effusion   Ddx: Chronic PE vs PNA vs Hemothorax   - Pulm consulted appreciate recs  - Chest tube in to suction, 450 ml out yesterday, sanguinous appearing. S/p tpa/dornase x1  - Pleural fluid analysis with exudative picture but low glucose.  Although output is sanguinous, low glu more indicative of infectious or rheumatoid effusion.  - bronch with no acute findings     KARINA-worsening  Pre Umu   FeNa:0.9%  Likely secondary to suspected prerenal, hemodynamic changes as well as decreasing hemoglobin  Patient baseline creatinine = 1.0  Today his creatinine jumped to 2.5>2.9  -encouraging po intake   -Stop nephro toxic med (Vanc)  -Nephro consult: mariela recs     Chronic PE   CT PE does not show a new PE  Previous admission with + hypercoag workup for showing +homocysteine. .   - ASA  - eliquis held  - Hypercoag labs: B12 nl , folate low, replete     Acute on Chronic Anemia    on 2/9 Hgb: 7.5>7.6 (hGB:11 2022)  nl B12, folate <2  - folate replete  - Patient also with significant bloody output via Chest tube in past 2 days. - output slowing down today. - If acutely hypotensive or bleeding will check HnH      Hx of IVDU  Pt states that he has not used in a long time and is decreasing the dose of methadone  -45mg Methadone     HTN  - home coreg, holding today  - home valsartan. Holding today    DVT PPx:HEPARIN SQ  Diet:  ADULT DIET; Regular;  Low Sodium (2 gm)   Code status:  Full Code     Will discuss with attending physician Dr. Bakari Gooden MD.     Stevenson Chavez MD  Internal Medicine, PGY-1

## 2023-02-14 NOTE — PROGRESS NOTES
Attempted to ambulate patient and get up to chair. Patient refused and refused to use incentive spirometer. Educated patient the importance of ambulation and using spirometer. Patient stated understanding but continued to refuse. Will continue to educate and have patient ambulate and use spirometer.

## 2023-02-14 NOTE — PLAN OF CARE
Problem: Safety - Adult  Goal: Free from fall injury  Outcome: Progressing  Note: Pt with absence of falls this shift. All fall precautions in place. RN monitors frequently in the anticipation of needs. Will continue to monitor. Problem: Respiratory - Adult  Goal: Achieves optimal ventilation and oxygenation  Outcome: Progressing  Note: Pt weaned to 1L nasal cannula this shift. SpO2 remains >90%. No complaints of SOB this shift. CT remains to -20 suction and assessment WNL. Will continue to monitor. Problem: Cardiovascular - Adult  Goal: Maintains optimal cardiac output and hemodynamic stability  Outcome: Progressing  Note: VSS this shift. Pt remains NSR. Problem: Pain  Goal: Verbalizes/displays adequate comfort level or baseline comfort level  Outcome: Progressing  Note: Pt does not complain of pain this shift and rates pain 0/10. No intervention needed at this time. Will continue to monitor.

## 2023-02-15 ENCOUNTER — APPOINTMENT (OUTPATIENT)
Dept: GENERAL RADIOLOGY | Age: 48
DRG: 166 | End: 2023-02-15
Payer: MEDICAID

## 2023-02-15 PROBLEM — J84.89 ORGANIZING PNEUMONIA (HCC): Status: ACTIVE | Noted: 2023-02-15

## 2023-02-15 LAB
ALBUMIN SERPL-MCNC: 2.4 G/DL (ref 3.4–5)
ALP BLD-CCNC: 62 U/L (ref 40–129)
ALT SERPL-CCNC: 6 U/L (ref 10–40)
AMMONIA: 30 UMOL/L (ref 16–60)
ANION GAP SERPL CALCULATED.3IONS-SCNC: 9 MMOL/L (ref 3–16)
ASPERGILLUS GALACTO AG: NEGATIVE
ASPERGILLUS GALACTO INDEX: 0.09
AST SERPL-CCNC: 14 U/L (ref 15–37)
BASOPHILS ABSOLUTE: 0.1 K/UL (ref 0–0.2)
BASOPHILS RELATIVE PERCENT: 1.2 %
BILIRUB SERPL-MCNC: 0.5 MG/DL (ref 0–1)
BILIRUBIN DIRECT: <0.2 MG/DL (ref 0–0.3)
BILIRUBIN, INDIRECT: ABNORMAL MG/DL (ref 0–1)
BUN BLDV-MCNC: 15 MG/DL (ref 7–20)
CALCIUM SERPL-MCNC: 8.3 MG/DL (ref 8.3–10.6)
CHLORIDE BLD-SCNC: 101 MMOL/L (ref 99–110)
CO2: 27 MMOL/L (ref 21–32)
CREAT SERPL-MCNC: 2.7 MG/DL (ref 0.9–1.3)
EOSINOPHILS ABSOLUTE: 0.1 K/UL (ref 0–0.6)
EOSINOPHILS RELATIVE PERCENT: 1.9 %
FINAL REPORT: NORMAL
GFR SERPL CREATININE-BSD FRML MDRD: 28 ML/MIN/{1.73_M2}
GLUCOSE BLD-MCNC: 71 MG/DL (ref 70–99)
GLUCOSE BLD-MCNC: 99 MG/DL (ref 70–99)
HCT VFR BLD CALC: 25.8 % (ref 40.5–52.5)
HEMOGLOBIN: 8 G/DL (ref 13.5–17.5)
INR BLD: 1.35 (ref 0.87–1.14)
LYMPHOCYTES ABSOLUTE: 0.6 K/UL (ref 1–5.1)
LYMPHOCYTES RELATIVE PERCENT: 9.4 %
MCH RBC QN AUTO: 31.6 PG (ref 26–34)
MCHC RBC AUTO-ENTMCNC: 31 G/DL (ref 31–36)
MCV RBC AUTO: 101.8 FL (ref 80–100)
MONOCYTES ABSOLUTE: 0.8 K/UL (ref 0–1.3)
MONOCYTES RELATIVE PERCENT: 12.4 %
MRSA SCREEN RT-PCR: NORMAL
NEUTROPHILS ABSOLUTE: 4.8 K/UL (ref 1.7–7.7)
NEUTROPHILS RELATIVE PERCENT: 75.1 %
PDW BLD-RTO: 18.3 % (ref 12.4–15.4)
PERFORMED ON: NORMAL
PLATELET # BLD: 197 K/UL (ref 135–450)
PMV BLD AUTO: 9.9 FL (ref 5–10.5)
POTASSIUM SERPL-SCNC: 4.3 MMOL/L (ref 3.5–5.1)
PRELIMINARY: NORMAL
PRO-BNP: 1837 PG/ML (ref 0–124)
PROTHROMBIN TIME: 16.6 SEC (ref 11.7–14.5)
RBC # BLD: 2.54 M/UL (ref 4.2–5.9)
SODIUM BLD-SCNC: 137 MMOL/L (ref 136–145)
TOTAL PROTEIN: 5.6 G/DL (ref 6.4–8.2)
WBC # BLD: 6.4 K/UL (ref 4–11)

## 2023-02-15 PROCEDURE — 80048 BASIC METABOLIC PNL TOTAL CA: CPT

## 2023-02-15 PROCEDURE — 71045 X-RAY EXAM CHEST 1 VIEW: CPT

## 2023-02-15 PROCEDURE — 80076 HEPATIC FUNCTION PANEL: CPT

## 2023-02-15 PROCEDURE — 74018 RADEX ABDOMEN 1 VIEW: CPT

## 2023-02-15 PROCEDURE — 85610 PROTHROMBIN TIME: CPT

## 2023-02-15 PROCEDURE — 2580000003 HC RX 258: Performed by: INTERNAL MEDICINE

## 2023-02-15 PROCEDURE — 85025 COMPLETE CBC W/AUTO DIFF WBC: CPT

## 2023-02-15 PROCEDURE — 82140 ASSAY OF AMMONIA: CPT

## 2023-02-15 PROCEDURE — 6360000002 HC RX W HCPCS: Performed by: STUDENT IN AN ORGANIZED HEALTH CARE EDUCATION/TRAINING PROGRAM

## 2023-02-15 PROCEDURE — 6360000002 HC RX W HCPCS: Performed by: INTERNAL MEDICINE

## 2023-02-15 PROCEDURE — 2060000000 HC ICU INTERMEDIATE R&B

## 2023-02-15 PROCEDURE — 6370000000 HC RX 637 (ALT 250 FOR IP)

## 2023-02-15 PROCEDURE — 99232 SBSQ HOSP IP/OBS MODERATE 35: CPT | Performed by: INTERNAL MEDICINE

## 2023-02-15 PROCEDURE — 99233 SBSQ HOSP IP/OBS HIGH 50: CPT | Performed by: INTERNAL MEDICINE

## 2023-02-15 PROCEDURE — 94640 AIRWAY INHALATION TREATMENT: CPT

## 2023-02-15 PROCEDURE — 6370000000 HC RX 637 (ALT 250 FOR IP): Performed by: INTERNAL MEDICINE

## 2023-02-15 PROCEDURE — 6370000000 HC RX 637 (ALT 250 FOR IP): Performed by: STUDENT IN AN ORGANIZED HEALTH CARE EDUCATION/TRAINING PROGRAM

## 2023-02-15 PROCEDURE — 80074 ACUTE HEPATITIS PANEL: CPT

## 2023-02-15 PROCEDURE — 2700000000 HC OXYGEN THERAPY PER DAY

## 2023-02-15 PROCEDURE — 83880 ASSAY OF NATRIURETIC PEPTIDE: CPT

## 2023-02-15 PROCEDURE — 36415 COLL VENOUS BLD VENIPUNCTURE: CPT

## 2023-02-15 PROCEDURE — 94761 N-INVAS EAR/PLS OXIMETRY MLT: CPT

## 2023-02-15 RX ORDER — GAUZE BANDAGE 2" X 2"
100 BANDAGE TOPICAL DAILY
Status: DISCONTINUED | OUTPATIENT
Start: 2023-02-15 | End: 2023-02-19 | Stop reason: HOSPADM

## 2023-02-15 RX ORDER — PREDNISONE 50 MG/1
100 TABLET ORAL DAILY
Status: DISCONTINUED | OUTPATIENT
Start: 2023-02-15 | End: 2023-02-19 | Stop reason: HOSPADM

## 2023-02-15 RX ORDER — GLUCAGON 1 MG/ML
1 KIT INJECTION PRN
Status: DISCONTINUED | OUTPATIENT
Start: 2023-02-15 | End: 2023-02-19 | Stop reason: HOSPADM

## 2023-02-15 RX ORDER — IPRATROPIUM BROMIDE AND ALBUTEROL SULFATE 2.5; .5 MG/3ML; MG/3ML
1 SOLUTION RESPIRATORY (INHALATION)
Status: DISCONTINUED | OUTPATIENT
Start: 2023-02-16 | End: 2023-02-15

## 2023-02-15 RX ORDER — DEXTROSE MONOHYDRATE 100 MG/ML
INJECTION, SOLUTION INTRAVENOUS CONTINUOUS PRN
Status: DISCONTINUED | OUTPATIENT
Start: 2023-02-15 | End: 2023-02-19 | Stop reason: HOSPADM

## 2023-02-15 RX ORDER — IPRATROPIUM BROMIDE AND ALBUTEROL SULFATE 2.5; .5 MG/3ML; MG/3ML
1 SOLUTION RESPIRATORY (INHALATION)
Status: DISCONTINUED | OUTPATIENT
Start: 2023-02-15 | End: 2023-02-19 | Stop reason: HOSPADM

## 2023-02-15 RX ADMIN — PIPERACILLIN AND TAZOBACTAM 3375 MG: 3; .375 INJECTION, POWDER, LYOPHILIZED, FOR SOLUTION INTRAVENOUS at 15:57

## 2023-02-15 RX ADMIN — PIPERACILLIN AND TAZOBACTAM 3375 MG: 3; .375 INJECTION, POWDER, LYOPHILIZED, FOR SOLUTION INTRAVENOUS at 00:09

## 2023-02-15 RX ADMIN — SODIUM CHLORIDE: 9 INJECTION, SOLUTION INTRAVENOUS at 00:09

## 2023-02-15 RX ADMIN — SODIUM CHLORIDE, PRESERVATIVE FREE 10 ML: 5 INJECTION INTRAVENOUS at 20:15

## 2023-02-15 RX ADMIN — HEPARIN SODIUM 5000 UNITS: 5000 INJECTION INTRAVENOUS; SUBCUTANEOUS at 21:43

## 2023-02-15 RX ADMIN — ASPIRIN 81 MG 81 MG: 81 TABLET ORAL at 08:49

## 2023-02-15 RX ADMIN — IPRATROPIUM BROMIDE AND ALBUTEROL SULFATE 1 AMPULE: 2.5; .5 SOLUTION RESPIRATORY (INHALATION) at 21:25

## 2023-02-15 RX ADMIN — HEPARIN SODIUM 5000 UNITS: 5000 INJECTION INTRAVENOUS; SUBCUTANEOUS at 13:23

## 2023-02-15 RX ADMIN — HEPARIN SODIUM 5000 UNITS: 5000 INJECTION INTRAVENOUS; SUBCUTANEOUS at 06:21

## 2023-02-15 RX ADMIN — PREDNISONE 100 MG: 50 TABLET ORAL at 15:55

## 2023-02-15 RX ADMIN — THIAMINE HCL TAB 100 MG 100 MG: 100 TAB at 13:26

## 2023-02-15 RX ADMIN — FOLIC ACID 1 MG: 1 TABLET ORAL at 08:49

## 2023-02-15 RX ADMIN — SODIUM CHLORIDE, PRESERVATIVE FREE 10 ML: 5 INJECTION INTRAVENOUS at 09:00

## 2023-02-15 RX ADMIN — Medication 45 MG: at 08:49

## 2023-02-15 RX ADMIN — PIPERACILLIN AND TAZOBACTAM 3375 MG: 3; .375 INJECTION, POWDER, LYOPHILIZED, FOR SOLUTION INTRAVENOUS at 08:56

## 2023-02-15 ASSESSMENT — PAIN SCALES - GENERAL
PAINLEVEL_OUTOF10: 0
PAINLEVEL_OUTOF10: 0

## 2023-02-15 NOTE — PROGRESS NOTES
[facility]    [facility]              [phone]                       Plan :     BP is well controlled  Urine is 300 ml ++  Creatinine is stabilizing, 1.0> 1.7 > 2.5 > 2.9 > 2.7       - Agree with changing Vancomycin to Linezolid  -  DC IVF  -  DC K supplements   - Strict I's and O's  - Avoid nephrotoxic medications ( holding Coreg and Diovan )  - Monitor labwork       Assessment :     Jacqueline Garcia is a 56 y.o. male with hx of HTN, DVT, PE, treated Hep C, and COPD on HOD#5 for SOB secondary to right pleural collection (suspected hemothorax). Our services were consulted for ATN. KARINA stage 2, suspected ischemic ATN with superimposed Vancomycin ATN  - Pt with signs of ATN today given acute kidney injury class II with etiologies supporting ATN, ischemic vs. Nephrotoxic. Given the history of intermittent hypotension (now normal) and elevated serum vancomycin level (30.5 on 2/13) preceding the creatinine increase, we suspect ATN due to a combination of these causes. As patient has started to urinate again, we suspect gradual improvement and return to function of the kidneys with normal blood pressure and removal of vancomycin  - Discontinue vancomycin; continue Linezolid as ordered by primary team  - Continue blood pressure control   - continue to hold fluids given normal BP today   - Hold BP medications (carvedilol, valsartan)  - Continue to trend labs    Hypotension (resolved)  Intermittent on admission, but well-controlled today.    - continue to hold fluids given normal BP today  - Hold BP medications (carvedilol, valsartan)  - Monitor VS    [facility] would like to thank David Stanton MD   for opportunity to serve this patient      Please call with questions at-   24 Hrs Answering service [phone] or  7 am- 5 pm via Perfect serve or cell phone  Oni Jones MD        CC/reason for consult :     ATN     HPI :     Jacqueline Garcia is a 56 y.o. male with history of HTN, DVT, PE, treated Hep C, and COPD who presented to the hospital on 2/7/2023 with shortness of breath. On CT chest on 2/8, showed large right pleural fluid collection with suspected hemothorax  secondary to suspected trauma; chest tube was placed on 2/8 with drainage of sanguinous fluid. Pleural fluid analysis showed , total protein of 4.3, glucose 20 suggesting exudative pleural effusion/empyema or hemothorax. Given collection, patient was started on Zosyn (ongoing until 2/16) and vancomycin on 2/9 (completed on 2/12). Patient afebrile, normocardic, and with normal WBC through hospitalization, but with intermittent hypotension as low as the 62-70B systolic. For the past several days, the patient's creatinine has been elevating (2.5 today from 1.0 on 2/10) leading to consultation of Nephrology for concerns of ATN. Interval History:     Patient reports overall doing well with improving SOB. Chest tube continuing to drain small amounts of sanguinous fluid. Patient reports that he has started to urinate again, albeit it being dark in color. Denies any increases in urinary frequency otherwise or any tenderness with urination. Denies any lower back pain.     ROS:     Seen with- Dr. Christine Mccall MD, Hermelinda Mcneill, MS4    positives in bold   Constitutional:  fever, chills, weakness, weight change, fatigue  Skin:  rash, pruritus, hair loss, bruising, dry skin, petechiae  Head, Face, Neck   headaches, swelling,  cervical adenopathy  Respiratory: shortness of breath, cough, or wheezing  Cardiovascular: chest pain, palpitations, dizzy, edema  Gastrointestinal: nausea, vomiting, diarrhea, constipation,belly pain    Yellow skin, blood in stool  Musculoskeletal:  back pain, muscle weakness, gait problems,       joint pain, swelling of the legs  Genitourinary:  dysuria, poor urine flow, flank pain, dark urine  Neurologic:  vertigo, TIA'S, syncope, seizures, focal weakness  Psychosocial:  insomnia, anxiety, or depression. Additional positive findings:                          All other remaining systems are negative. PMH/PSH/SH/Family History:     Past Medical History:   Diagnosis Date    htn     IVDU (intravenous drug user)     PE (pulmonary thromboembolism) ([facility] [address]        Past Surgical History:   Procedure Laterality Date    BRONCHOSCOPY N/A 2/10/2023    BRONCHOSCOPY ALVEOLAR LAVAGE performed by Alexis Rodriguez MD at [address] N/A 2/10/2023    BRONCHOSCOPY/TRANSBRONCHIAL LUNG BIOPSY performed by Alexis Rodriguez MD at [address] History     Socioeconomic History    Marital status: Single     Spouse name: Not on file    Number of children: Not on file    Years of education: Not on file    Highest education level: Not on file   Occupational History    Not on file   Tobacco Use    Smoking status: Every Day     Packs/day: 0.50     Types: Cigarettes    Smokeless tobacco: Never   Substance and Sexual Activity    Alcohol use: Not Currently    Drug use: Not Currently     Types: Opiates     Sexual activity: Not on file   Other Topics Concern    Not on file   Social History Narrative    Not on file     Social Determinants of Health     Financial Resource Strain: Not on file   Food Insecurity: Not on file   Transportation Needs: Not on file   Physical Activity: Not on file   Stress: Not on file   Social Connections: Not on file   Intimate Partner Violence: Not on file   Housing Stability: Not on file       History reviewed. No pertinent family history.        Medication:     Scheduled Meds:   heparin (porcine)  5,000 Units SubCUTAneous 3 times per day    piperacillin-tazobactam  3,375 mg IntraVENous Q8H    sodium chloride flush  5-40 mL IntraVENous 2 times per day    [Held by provider] valsartan  40 mg Oral 2 times per day    [Held by provider] carvedilol  3.125 mg Oral BID WC    aspirin  81 mg Oral Daily    methadone  45 mg Oral Daily    folic acid  1 mg Oral Daily     Continuous Infusions:   sodium chloride 100 mL/hr at 02/15/23 0009     PRN Meds:.prochlorperazine, sodium chloride flush, sodium chloride, polyethylene glycol, acetaminophen **OR** acetaminophen, potassium chloride **OR** potassium alternative oral replacement **OR** potassium chloride, magnesium sulfate, perflutren lipid microspheres, aluminum & magnesium hydroxide-simethicone       Vitals :     Vitals:    02/15/23 0830   BP: 124/86   Pulse: 74   Resp: 18   Temp: 98.3 °F (36.8 °C)   SpO2: 98%       I & O :       Intake/Output Summary (Last 24 hours) at 2/15/2023 0708  Last data filed at 2/15/2023 0400  Gross per 24 hour   Intake 400 ml   Output 330 ml   Net 70 ml          Physical Examination :     General appearance: Anxious- no, distressed- no, in good spirits- yes  HEENT: Lips- normal, teeth- ok , oral mucosa- moist  Neck : Mass- no, appears symmetrical, JVD- not visible  Respiratory: Respiratory effort-  normal, wheeze- no, crackles - no. Patient with right chest tube present. Cardiovascular:  Ausculation- No M/R/G, 1+ pitting edema of the bilateral lower extremities and feet  Abdomen: visible mass- no, distention- no, scar- no, tenderness- no                            hepatosplenomegaly-  no  Musculoskeletal:  clubbing no,cyanosis- no , digital ischemia- no                           muscle strength- grossly normal , tone - grossly normal  Skin: rashes- no , ulcers- no, induration- no, tightening - no  Psychiatric:  Judgement and insight- normal           AAO X 3     LABS:     Recent Labs     02/13/23  0520 02/14/23  0608 02/15/23  0445   WBC 7.3 6.8 6.4   HGB 7.5* 7.6* 8.0*   HCT 23.5* 23.5* 25.8*    185 197       Recent Labs     02/13/23  0520 02/14/23  0608 02/15/23  0445    137 137   K 4.4 5.0 4.3    100 101   CO2 30 28 27   BUN 16 16 15   CREATININE 2.5* 2.9* 2.7*   GLUCOSE 79 87 71          Will discuss with Dr. Fredo Warren MD  Internal Medicine, PGY3    [facility]  Internal Medicine, MS4        Patient was seen and examined and the case was discussed with the resident. He acted as my scribe. I agree with the assessment and plan.     Thanks  Nephrology  [phone] [facility], [address] [address]  Office: [phone]  Cell: [phone]  Fax: [phone]

## 2023-02-15 NOTE — PROGRESS NOTES
[facility] Daily Progress Note      Admit Date:  2/7/2023    Subjective:  Mr. Angie Lomeli was seen and examined. F/U CHF. Breathing about same. No chest pain.      Objective:   /86   Pulse 74   Temp 98.3 °F (36.8 °C) (Oral)   Resp 18   Ht 5' 10\" (1.778 m)   Wt 226 lb 3.1 oz (102.6 kg)   SpO2 98%   BMI 32.46 kg/m²     Intake/Output Summary (Last 24 hours) at 2/15/2023 1237  Last data filed at 2/15/2023 1015  Gross per 24 hour   Intake 540 ml   Output 330 ml   Net 210 ml       TELEMETRY: Sinus     Physical Exam:  General:  Awake, alert, NAD  Skin:  Warm and dry  Neck:  JVP difficult  Chest:  decreased BS,crackles, respiration normal at rest.  Cardiovascular:  RRR S1S2  Abdomen:  Soft nontender  Extremities:  no edema    Medications:    thiamine  100 mg Oral Daily    heparin (porcine)  5,000 Units SubCUTAneous 3 times per day    piperacillin-tazobactam  3,375 mg IntraVENous Q8H    sodium chloride flush  5-40 mL IntraVENous 2 times per day    [Held by provider] valsartan  40 mg Oral 2 times per day    [Held by provider] carvedilol  3.125 mg Oral BID WC    aspirin  81 mg Oral Daily    methadone  45 mg Oral Daily    folic acid  1 mg Oral Daily      dextrose      sodium chloride 100 mL/hr at 02/15/23 0009     glucose, dextrose bolus **OR** dextrose bolus, glucagon (rDNA), dextrose, prochlorperazine, sodium chloride flush, sodium chloride, polyethylene glycol, acetaminophen **OR** acetaminophen, potassium chloride **OR** potassium alternative oral replacement **OR** potassium chloride, magnesium sulfate, perflutren lipid microspheres, aluminum & magnesium hydroxide-simethicone    Lab Data:  CBC:   Recent Labs     02/13/23  0520 02/14/23  0608 02/15/23  0445   WBC 7.3 6.8 6.4   HGB 7.5* 7.6* 8.0*   HCT 23.5* 23.5* 25.8*   .3* 99.8 101.8*    185 197     BMP:   Recent Labs     02/13/23  0520 02/14/23  0608 02/15/23  0445    137 137   K 4.4 5.0 4.3    100 101   CO2 30 28 27   BUN 16 16 15   CREATININE 2.5* 2.9* 2.7*     LIVER PROFILE:   Recent Labs     02/15/23  0445   AST 14*   ALT 6*   BILIDIR <0.2   BILITOT 0.5   ALKPHOS 62     PT/INR:   No results for input(s): PROTIME, INR in the last 72 hours. APTT: No results for input(s): APTT in the last 72 hours. BNP:  No results for input(s): BNP in the last 72 hours. IMAGING:     Assessment:  Patient Active Problem List    Diagnosis Date Noted    Hypoxemia 02/11/2023    Acute heart failure, unspecified heart failure type ([facility] [address] 02/08/2023    Normocytic anemia 02/08/2023    Chronic anticoagulation 02/08/2023    HTN (hypertension) 02/08/2023    Opioid dependence on agonist therapy ([facility] [address] 02/08/2023    Hx pulmonary embolism 02/08/2023    Acute pulmonary edema ([facility] [address] 02/08/2023    Hemothorax on right 02/08/2023    Pleural effusion 02/08/2023       Plan:  Breathing some better. O2 requirements  better. Coreg/diovan on hold, bp marginal. Cr stable. CT in place. Output decreasing. Cytology pending. BNP up somewhat. Pulmonary following.        Core Measures:  Discharge instructions:   LVEF documented:   ACEI for LV dysfunction:   Smoking Cessation:    Autumn Rowe MD, MD 2/15/2023 12:37 PM

## 2023-02-15 NOTE — PROGRESS NOTES
Occupational Therapy  Refusal  Pt declining therapy today, declined all OOB and bed level activity. Reporting having a bad night and is currently wearing a depends, pt encouraged to change into pull up brief, however adamantly declined. \"I just can't do it today, maybe check again tomorrow\" RN informed, when RN attempted to encourage pt to participate in therapy pt reporting increased stomach pain. Will follow up per OT POC. Elva Harvey.  [facility], OTR/L [id number]

## 2023-02-15 NOTE — PROGRESS NOTES
Internal Medicine Progress Note    Patient Name: Kim Kolb   Patient : 1975   Date: 2/15/2023   Admit Date: 2023     CC: Shortness of Breath (Pt complaining of sob x6 days. Was brought in on a NRB and 94%. States he has not been taking his blood thinner as he should)       Subjective     Interval History: Patient denies any acute events overnight. He denies any bowel movements and are passing flatus. He said he had 1 bowel movement yesterday. Appears more distended than yesterday. He otherwise denies any shortness of breath, chest pain, abdominal pain, changes in urinary pattern. ROS:  As per interval history above. Objective     Vital Signs:  Patient Vitals for the past 8 hrs:   BP Temp Temp src Pulse Resp SpO2 Weight   02/15/23 0600 -- -- -- 81 -- -- 226 lb 3.1 oz (102.6 kg)   02/15/23 0410 (!) 143/90 98.2 °F (36.8 °C) Oral 73 18 96 % --   23 2338 (!) 141/85 97.7 °F (36.5 °C) Oral 78 18 98 % --       Physical Exam:  Physical Exam  Constitutional:       General: He is not in acute distress. Appearance: He is obese. He is not ill-appearing, toxic-appearing or diaphoretic. HENT:      Head: Normocephalic and atraumatic. Nose: Nose normal.      Mouth/Throat:      Mouth: Mucous membranes are moist.   Eyes:      Pupils: Pupils are equal, round, and reactive to light. Cardiovascular:      Rate and Rhythm: Normal rate and regular rhythm. Pulses: Normal pulses. Heart sounds: Normal heart sounds. Pulmonary:      Effort: Pulmonary effort is normal. No respiratory distress. Breath sounds: Normal breath sounds. No stridor. No wheezing, rhonchi or rales. Chest:      Chest wall: No tenderness. Abdominal:      Comments: Hypoactive bowel sounds with generalized abdominal distention   Musculoskeletal:      Cervical back: Normal range of motion. Right lower leg: No edema. Left lower leg: No edema. Skin:     General: Skin is dry.       Capillary Refill: Capillary refill takes less than 2 seconds. Coloration: Skin is not jaundiced or pale. Findings: No bruising, erythema, lesion or rash. Neurological:      General: No focal deficit present. Mental Status: He is alert and oriented to person, place, and time. Mental status is at baseline. Comments: Asymmetric asterixis       Intake/Output Summary (Last 24 hours) at 2/15/2023 0713  Last data filed at 2/15/2023 0400  Gross per 24 hour   Intake 400 ml   Output 330 ml   Net 70 ml        Medications:   heparin (porcine)  5,000 Units SubCUTAneous 3 times per day    piperacillin-tazobactam  3,375 mg IntraVENous Q8H    sodium chloride flush  5-40 mL IntraVENous 2 times per day    [Held by provider] valsartan  40 mg Oral 2 times per day    [Held by provider] carvedilol  3.125 mg Oral BID WC    aspirin  81 mg Oral Daily    methadone  45 mg Oral Daily    folic acid  1 mg Oral Daily       sodium chloride 100 mL/hr at 02/15/23 0009      prochlorperazine, sodium chloride flush, sodium chloride, polyethylene glycol, acetaminophen **OR** acetaminophen, potassium chloride **OR** potassium alternative oral replacement **OR** potassium chloride, magnesium sulfate, perflutren lipid microspheres, aluminum & magnesium hydroxide-simethicone     Labs:  CBC:   Recent Labs     02/13/23  0520 02/14/23  0608 02/15/23  0445   WBC 7.3 6.8 6.4   HGB 7.5* 7.6* 8.0*   HCT 23.5* 23.5* 25.8*    185 197   .3* 99.8 101.8*       Renal:    Recent Labs     02/12/23  0721 02/13/23  0520 02/14/23  0608 02/15/23  0445   * 136 137 137   K 4.1 4.4 5.0 4.3   CL 98* 100 100 101   CO2 30 30 28 27   BUN 16 16 16 15   CREATININE 1.7* 2.5* 2.9* 2.7*   GLUCOSE 82 79 87 71   CALCIUM 7.9* 8.2* 6.6* 8.3   MG 1.90  --   --   --    ANIONGAP 5 6 9 9       Hepatic: No results for input(s): AST, ALT, BILITOT, BILIDIR, PROT, LABALBU, ALKPHOS in the last 72 hours.     Troponin: Invalid input(s): TROPONIN    Lactic acid: Invalid input(s): LACTICACID    BNP: No results for input(s): BNP in the last 72 hours. Pro-BNP:   Recent Labs     02/12/23  0721   PROBNP 588*       Lipids: No results for input(s): CHOL, TRIG, HDL, LDLCALC, VLDL in the last 72 hours. ABGs:  No results for input(s): PHART, UUK7WFX, PO2ART, OLO3CHH, BEART, THGBART, F7SMFNET, YFO2GQC in the last 72 hours. VBGs: No results for input(s): PHVEN, VWN4BRQ, PO2VEN in the last 72 hours. INR: No results for input(s): INR in the last 72 hours. aPTT: No results for input(s): APTT in the last 72 hours. Procalcitonin: No results for input(s): PROCAL in the last 72 hours. CRP: No results for input(s): CRP in the last 72 hours. ESR: No results for input(s): ESR in the last 72 hours. Radiology:  XR CHEST PORTABLE   Final Result      No significant change. XR CHEST PORTABLE   Final Result      Stable appearance of the chest.      XR CHEST PORTABLE   Final Result   1. Slight improvement in multifocal airspace disease. 2.  No significant change in the right pleural effusion. XR CHEST PORTABLE   Final Result   1. No change. XR CHEST PORTABLE   Final Result   1. Persistent right effusion and right lower lung airspace disease without change from prior. 2. Extensive left-sided airspace disease stable to prior. FLUORO FOR SURGICAL PROCEDURES   Final Result      1. Intraprocedure fluoroscopy was provided. XR CHEST 1 VIEW   Final Result      1. Intraprocedure fluoroscopy was provided. XR CHEST PORTABLE   Final Result      1. No change in appearance of bilateral airspace disease. 2.  Right inferior thoracic pigtail catheter likely pleural drain with small to moderate right pleural effusion unchanged. XR CHEST PORTABLE   Final Result   1. As above. XR CHEST PORTABLE   Final Result      1. Stable appearance of the chest with residual right pleural effusion with right chest tube in place and trace associated right basilar pleural gas. 2.  Extensive bilateral airspace disease in the left greater than right lungs. XR CHEST PORTABLE   Final Result      1. Slightly decreased loculated right pleural effusion status post right chest tube placement. There is a small amount of associated right pleural gas. 2.  Persistent diffuse airspace disease in the left lung and right mid and lower lung airspace disease. CT CHEST PULMONARY EMBOLISM W CONTRAST   Final Result      Linear, eccentric, and left leg filling defects in the right lower lobe segmental and subsegmental pulmonary arteries are suggestive of chronic pulmonary emboli. No definite acute pulmonary emboli identified. Extensive groundglass opacification of both lungs is suspicious for atypical pneumonia although a component of asymmetric pulmonary edema could have a similar appearance. Large complex loculated right pleural fluid collection. Recommend diagnostic thoracentesis, as hemothorax or malignant effusion cannot be excluded. Trace left pleural effusion. INCIDENTAL FINDINGS: None. XR CHEST PORTABLE   Final Result      Extensive bilateral airspace disease, which may represent pneumonia or pulmonary edema. Cardiomegaly. Moderate to large right and questionable small left pleural effusions. XR CHEST PORTABLE    (Results Pending)       Assessment & Plan   Xiomara Garza is a 56 y.o. male, with PMHx of chronic PE on Eliquis, IVDU, Hep C, COPD, HTN who presents to the ED with complaints of SOB for the past 6 or 7 days. Pt has had a nonproductive cough as well. Acute Hypoxic Respiratory Failure-Improving   Likely 2/2 to pleural effusion vs malignancy  Pt has acute onset shortness of breath that began 6-7 days ago. CT PE shows significant pleural effusions on the right side as well a ground glass opacities. BNP 4462.  6/2022 - patient weight is ~240.  Patient this admission is ~225 (down 15 lbs)   Patient unimpressive infectious workup (nl WBC, barely elevated pro alba .19, afebrile). Trop <0 .01  New O2 requirement (15 HFNC on admission), now on 2L, Chest tube output total :1412  ECHO: 55%-60% EF, unable to eval diastolic fxn, 41 mmhg   - Cards consulted: Dillon Recs  - Pulmonology consulted dillon recs  -awaiting BAL and transbronchial biopsy cytology and culture results  - Continue Zosyn  -wean off oxygen as able     Large complex loculated right pleural fluid  Unknown etiology: culture pending  Tap on 2/8 with LD and Protein concerning for exudative effusion   Ddx: Chronic PE vs PNA vs Hemothorax   - Pulm consulted appreciate recs  - Chest tube discontinued today   - bronch with no acute findings  -awaiting cultures     KARINA-worsening  Pre Renal  HRS? Likely secondary to suspected prerenal, hemodynamic changes as well as decreasing hemoglobin,  FeNa:0.9%  Patient baseline creatinine = 1.0  Today his creatinine jumped to 2.5>2.9>2.7  -encouraging po intake   -Nephro consult: dillon recs  -strict intake and output UOP: 300     Concern for acute hepatic encephalopathy   Pt has hx of hep C and this morning appears more distended, and has asterixis on exam,  He reported; completed treatment for Hep C in 2021 and also had an hepatic encephalopathy episode the same year  -Hepatitis panel  -We will consider Abdominal Ultrasound if abdominal distension continues to worsens    Concern for acute abdominal obstruction-Ruled out   Patient denies passing flatus this a.m. or bowel movements, appears more distended on physical exam as compared to yesterday  -Abdominal x-ray WNL    Chronic PE   CT PE does not show a new PE  Previous admission with + hypercoag workup for showing +homocysteine. .   - ASA  - eliquis held  - Hypercoag labs: B12 nl , folate low, replete     Acute on Chronic Anemia    on 2/9 Hgb: 7.5>7.6 (hGB:11 2022)  nl B12, folate <2  - folate replete  - Patient also with significant bloody output via Chest tube in past 2 days.   - output slowing down today.  - If acutely hypotensive or bleeding will check HnH      Hx of IVDU  Pt states that he has not used in a long time and is decreasing the dose of methadone  -45mg Methadone     HTN  - home coreg, holding today  - home valsartan. Holding today    DVT PPx:heparin sq  Diet:  ADULT DIET; Regular;  Low Sodium (2 gm)   Code status:  Full Code     Will discuss with attending physician Dr. Alice Wilkes MD.     Edmundo Jules MD  Internal Medicine, PGY-1

## 2023-02-15 NOTE — PLAN OF CARE
Problem: Discharge Planning  Goal: Discharge to home or other facility with appropriate resources  Outcome: Progressing     Problem: Safety - Adult  Goal: Free from fall injury  Outcome: Progressing     Problem: ABCDS Injury Assessment  Goal: Absence of physical injury  Outcome: Progressing     Problem: Respiratory - Adult  Goal: Achieves optimal ventilation and oxygenation  Outcome: Progressing     Problem: Cardiovascular - Adult  Goal: Maintains optimal cardiac output and hemodynamic stability  Outcome: Progressing  Goal: Absence of cardiac dysrhythmias or at baseline  Outcome: Progressing     Problem: Metabolic/Fluid and Electrolytes - Adult  Goal: Electrolytes maintained within normal limits  Outcome: Progressing  Goal: Hemodynamic stability and optimal renal function maintained  Outcome: Progressing     Problem: Pain  Goal: Verbalizes/displays adequate comfort level or baseline comfort level  Outcome: Progressing     Problem: Skin/Tissue Integrity  Goal: Absence of new skin breakdown  Description: 1. Monitor for areas of redness and/or skin breakdown  2. Assess vascular access sites hourly  3. Every 4-6 hours minimum:  Change oxygen saturation probe site  4. Every 4-6 hours:  If on nasal continuous positive airway pressure, respiratory therapy assess nares and determine need for appliance change or resting period.   Outcome: Progressing

## 2023-02-15 NOTE — PROGRESS NOTES
Right chest tube removed by pulmonology. No complications noted, chest tube tip intact, dry dressing placed. Will continue to monitor site.

## 2023-02-15 NOTE — PROGRESS NOTES
Pulmonary Followup Note    CC: Pneumonitis    Interval History:    No significant changes overnight, feels a little better today. Down to 1L NC. Minimal output chest tube past few days, last 24 hours 30 cc. Chest tube pulled today, no complications noted. Transbronchial biopsy showing organizing pneumonia, focal early fibrosis and degenerative changes. No malignancy/granulomatous changes. Remains on Zoysn - last day chun       thiamine  100 mg Oral Daily    predniSONE  100 mg Oral Daily    heparin (porcine)  5,000 Units SubCUTAneous 3 times per day    piperacillin-tazobactam  3,375 mg IntraVENous Q8H    sodium chloride flush  5-40 mL IntraVENous 2 times per day    [Held by provider] valsartan  40 mg Oral 2 times per day    [Held by provider] carvedilol  3.125 mg Oral BID WC    aspirin  81 mg Oral Daily    methadone  45 mg Oral Daily    folic acid  1 mg Oral Daily     PHYSICAL EXAMINATION:  /84   Pulse 79   Temp 98.1 °F (36.7 °C) (Oral)   Resp 18   Ht 5' 10\" (1.778 m)   Wt 226 lb 3.1 oz (102.6 kg)   SpO2 93%   BMI 32.46 kg/m²   CURRENT PULSE OXIMETRY:  SpO2: 93 %  24HR PULSE OXIMETRY RANGE:  SpO2  Av.7 %  Min: 93 %  Max: 98 % on 2LNC      Gen: No acute distress. Speaking in full sentences with no issue at rest.   HEENT: PERRL, EOMI, OP nl  Lung: Diminished breath sounds, minimal wheezing  Chest: Chest tube in place, no surrounding erythema  CV: RRR without M/R/R  Abd: +BS, soft, NT/ND  Ext: No edema. DATA  CBC:   Recent Labs     23  0520 23  0608 02/15/23  0445   WBC 7.3 6.8 6.4   HGB 7.5* 7.6* 8.0*   HCT 23.5* 23.5* 25.8*   .3* 99.8 101.8*    185 197       BMP:   Recent Labs     23  0520 23  0608 02/15/23  0445    137 137   K 4.4 5.0 4.3    100 101   CO2 30 28 27   BUN 16 16 15   CREATININE 2.5* 2.9* 2.7*       No results for input(s): PHART, HTC3CZN, PO2ART in the last 72 hours.   LIVER PROFILE: Recent Labs     02/15/23  0445   AST 14*   ALT 6*   BILIDIR <0.2   BILITOT 0.5   ALKPHOS 62       CXR REVIEWED BY ME AND SHOWED:  XR ABDOMEN (KUB) (SINGLE AP VIEW)   Final Result      Nonobstructive bowel gas pattern. XR CHEST PORTABLE   Final Result      No significant change. XR CHEST PORTABLE   Final Result      Stable appearance of the chest.      XR CHEST PORTABLE   Final Result   1. Slight improvement in multifocal airspace disease. 2.  No significant change in the right pleural effusion. XR CHEST PORTABLE   Final Result   1. No change. XR CHEST PORTABLE   Final Result   1. Persistent right effusion and right lower lung airspace disease without change from prior. 2. Extensive left-sided airspace disease stable to prior. FLUORO FOR SURGICAL PROCEDURES   Final Result      1. Intraprocedure fluoroscopy was provided. XR CHEST 1 VIEW   Final Result      1. Intraprocedure fluoroscopy was provided. XR CHEST PORTABLE   Final Result      1. No change in appearance of bilateral airspace disease. 2.  Right inferior thoracic pigtail catheter likely pleural drain with small to moderate right pleural effusion unchanged. XR CHEST PORTABLE   Final Result   1. As above. XR CHEST PORTABLE   Final Result      1. Stable appearance of the chest with residual right pleural effusion with right chest tube in place and trace associated right basilar pleural gas. 2.  Extensive bilateral airspace disease in the left greater than right lungs. XR CHEST PORTABLE   Final Result      1. Slightly decreased loculated right pleural effusion status post right chest tube placement. There is a small amount of associated right pleural gas. 2.  Persistent diffuse airspace disease in the left lung and right mid and lower lung airspace disease.          CT CHEST PULMONARY EMBOLISM W CONTRAST   Final Result      Linear, eccentric, and left leg filling defects in the right lower lobe segmental and subsegmental pulmonary arteries are suggestive of chronic pulmonary emboli. No definite acute pulmonary emboli identified. Extensive groundglass opacification of both lungs is suspicious for atypical pneumonia although a component of asymmetric pulmonary edema could have a similar appearance. Large complex loculated right pleural fluid collection. Recommend diagnostic thoracentesis, as hemothorax or malignant effusion cannot be excluded. Trace left pleural effusion. INCIDENTAL FINDINGS: None. XR CHEST PORTABLE   Final Result      Extensive bilateral airspace disease, which may represent pneumonia or pulmonary edema. Cardiomegaly. Moderate to large right and questionable small left pleural effusions. XR CHEST PORTABLE    (Results Pending)          ASSESSMENT/PLAN:    Right hemothorax  Diffuse alveolar opacities of uncertain etiology  - Cultures remaining negative  -Transbronchial biopsy shows organizing pneumonia, focal early fibrosis, no malignancy/granulomatous  - on Zosyn (2/9) - last day tomorrow  - started on Prednisone 1 mgkg for fibrosis    Patient has been discussed and staffed with Dr. Mikki Childs MD    Patient examined, findings as discussed with Dr. Joanne Finley. Agree with assessment and plan. Chest tube is not draining significant fluid from hemothorax, and there has been no significant changes radiographically. Chest tube is removed. Transbronchial biopsies demonstrate certainly an inflammatory process with organizing pneumonia. The importance of fibrosis in this biopsy specimen is unclear. He does not have significant findings of fibrotic lung disease. Etiology of organizing pneumonia is not at all clear from his history. Antibiotics are discontinued, and glucocorticoid therapy is being instituted. Monitor clinical progress and improvement of hypoxemia.

## 2023-02-16 ENCOUNTER — APPOINTMENT (OUTPATIENT)
Dept: GENERAL RADIOLOGY | Age: 48
DRG: 166 | End: 2023-02-16
Payer: MEDICAID

## 2023-02-16 LAB
ANION GAP SERPL CALCULATED.3IONS-SCNC: 11 MMOL/L (ref 3–16)
BASOPHILS ABSOLUTE: 0 K/UL (ref 0–0.2)
BASOPHILS RELATIVE PERCENT: 0.2 %
BUN BLDV-MCNC: 14 MG/DL (ref 7–20)
CALCIUM SERPL-MCNC: 8.6 MG/DL (ref 8.3–10.6)
CHLORIDE BLD-SCNC: 101 MMOL/L (ref 99–110)
CO2: 26 MMOL/L (ref 21–32)
CREAT SERPL-MCNC: 2.7 MG/DL (ref 0.9–1.3)
EOSINOPHILS ABSOLUTE: 0 K/UL (ref 0–0.6)
EOSINOPHILS RELATIVE PERCENT: 0 %
FINAL REPORT: NORMAL
GFR SERPL CREATININE-BSD FRML MDRD: 28 ML/MIN/{1.73_M2}
GLUCOSE BLD-MCNC: 154 MG/DL (ref 70–99)
GLUCOSE BLD-MCNC: 155 MG/DL (ref 70–99)
HAV IGM SER IA-ACNC: ABNORMAL
HCT VFR BLD CALC: 26.9 % (ref 40.5–52.5)
HEMOGLOBIN: 8.6 G/DL (ref 13.5–17.5)
HEPATITIS B CORE IGM ANTIBODY: ABNORMAL
HEPATITIS B SURFACE ANTIGEN INTERPRETATION: ABNORMAL
HEPATITIS C ANTIBODY INTERPRETATION: REACTIVE
LYMPHOCYTES ABSOLUTE: 0.3 K/UL (ref 1–5.1)
LYMPHOCYTES RELATIVE PERCENT: 5.1 %
MCH RBC QN AUTO: 31.6 PG (ref 26–34)
MCHC RBC AUTO-ENTMCNC: 32 G/DL (ref 31–36)
MCV RBC AUTO: 98.8 FL (ref 80–100)
MONOCYTES ABSOLUTE: 0.3 K/UL (ref 0–1.3)
MONOCYTES RELATIVE PERCENT: 4.8 %
NEUTROPHILS ABSOLUTE: 5.6 K/UL (ref 1.7–7.7)
NEUTROPHILS RELATIVE PERCENT: 89.9 %
PDW BLD-RTO: 18.2 % (ref 12.4–15.4)
PERFORMED ON: ABNORMAL
PLATELET # BLD: 252 K/UL (ref 135–450)
PMV BLD AUTO: 10.2 FL (ref 5–10.5)
POTASSIUM SERPL-SCNC: 4.3 MMOL/L (ref 3.5–5.1)
PRELIMINARY: NORMAL
RBC # BLD: 2.73 M/UL (ref 4.2–5.9)
SODIUM BLD-SCNC: 138 MMOL/L (ref 136–145)
VANCOMYCIN RANDOM: 14.7 UG/ML
WBC # BLD: 6.2 K/UL (ref 4–11)

## 2023-02-16 PROCEDURE — 6370000000 HC RX 637 (ALT 250 FOR IP)

## 2023-02-16 PROCEDURE — 94664 DEMO&/EVAL PT USE INHALER: CPT

## 2023-02-16 PROCEDURE — 2060000000 HC ICU INTERMEDIATE R&B

## 2023-02-16 PROCEDURE — 94640 AIRWAY INHALATION TREATMENT: CPT

## 2023-02-16 PROCEDURE — 94761 N-INVAS EAR/PLS OXIMETRY MLT: CPT

## 2023-02-16 PROCEDURE — 6370000000 HC RX 637 (ALT 250 FOR IP): Performed by: STUDENT IN AN ORGANIZED HEALTH CARE EDUCATION/TRAINING PROGRAM

## 2023-02-16 PROCEDURE — 87522 HEPATITIS C REVRS TRNSCRPJ: CPT

## 2023-02-16 PROCEDURE — 6370000000 HC RX 637 (ALT 250 FOR IP): Performed by: INTERNAL MEDICINE

## 2023-02-16 PROCEDURE — 97116 GAIT TRAINING THERAPY: CPT

## 2023-02-16 PROCEDURE — 36415 COLL VENOUS BLD VENIPUNCTURE: CPT

## 2023-02-16 PROCEDURE — 6360000002 HC RX W HCPCS: Performed by: STUDENT IN AN ORGANIZED HEALTH CARE EDUCATION/TRAINING PROGRAM

## 2023-02-16 PROCEDURE — 93005 ELECTROCARDIOGRAM TRACING: CPT | Performed by: STUDENT IN AN ORGANIZED HEALTH CARE EDUCATION/TRAINING PROGRAM

## 2023-02-16 PROCEDURE — 80202 ASSAY OF VANCOMYCIN: CPT

## 2023-02-16 PROCEDURE — 85025 COMPLETE CBC W/AUTO DIFF WBC: CPT

## 2023-02-16 PROCEDURE — 99232 SBSQ HOSP IP/OBS MODERATE 35: CPT | Performed by: INTERNAL MEDICINE

## 2023-02-16 PROCEDURE — 71045 X-RAY EXAM CHEST 1 VIEW: CPT

## 2023-02-16 PROCEDURE — 80048 BASIC METABOLIC PNL TOTAL CA: CPT

## 2023-02-16 PROCEDURE — 2580000003 HC RX 258: Performed by: INTERNAL MEDICINE

## 2023-02-16 PROCEDURE — 97530 THERAPEUTIC ACTIVITIES: CPT

## 2023-02-16 RX ADMIN — HEPARIN SODIUM 5000 UNITS: 5000 INJECTION INTRAVENOUS; SUBCUTANEOUS at 05:43

## 2023-02-16 RX ADMIN — PREDNISONE 100 MG: 50 TABLET ORAL at 08:09

## 2023-02-16 RX ADMIN — HEPARIN SODIUM 5000 UNITS: 5000 INJECTION INTRAVENOUS; SUBCUTANEOUS at 22:18

## 2023-02-16 RX ADMIN — ASPIRIN 81 MG 81 MG: 81 TABLET ORAL at 08:10

## 2023-02-16 RX ADMIN — FOLIC ACID 1 MG: 1 TABLET ORAL at 08:10

## 2023-02-16 RX ADMIN — HEPARIN SODIUM 5000 UNITS: 5000 INJECTION INTRAVENOUS; SUBCUTANEOUS at 15:21

## 2023-02-16 RX ADMIN — IPRATROPIUM BROMIDE AND ALBUTEROL SULFATE 1 AMPULE: 2.5; .5 SOLUTION RESPIRATORY (INHALATION) at 09:15

## 2023-02-16 RX ADMIN — SODIUM CHLORIDE, PRESERVATIVE FREE 10 ML: 5 INJECTION INTRAVENOUS at 19:50

## 2023-02-16 RX ADMIN — Medication 45 MG: at 08:27

## 2023-02-16 RX ADMIN — IPRATROPIUM BROMIDE AND ALBUTEROL SULFATE 1 AMPULE: 2.5; .5 SOLUTION RESPIRATORY (INHALATION) at 12:45

## 2023-02-16 RX ADMIN — SODIUM CHLORIDE, PRESERVATIVE FREE 10 ML: 5 INJECTION INTRAVENOUS at 08:45

## 2023-02-16 RX ADMIN — THIAMINE HCL TAB 100 MG 100 MG: 100 TAB at 08:10

## 2023-02-16 ASSESSMENT — PAIN SCALES - GENERAL
PAINLEVEL_OUTOF10: 0

## 2023-02-16 ASSESSMENT — PAIN SCALES - WONG BAKER
WONGBAKER_NUMERICALRESPONSE: 0
WONGBAKER_NUMERICALRESPONSE: 0

## 2023-02-16 NOTE — PROGRESS NOTES
Pt reporting new chest tightness that begun shortly after chest tube was removed. Pt also experiencing some SOB. This RN assessed lung sounds some wheezing upon ausculation. Resident notified, CXR and new respiratory orders added. Pt reports feeling better after increasing O2 and receiving nebulizer treatment. Will continue to monitor for any changes.  Electronically signed by Jas Franco RN on 2/15/23 at 09:30 PM.

## 2023-02-16 NOTE — PROGRESS NOTES
Physical Therapy  Facility/Department: [place] [address] [facility]  Daily Treatment Note  NAME: Paris Rasheed  : 1975  MRN: [id number]    Date of Service: 2023    Discharge Recommendations: Paris Rasheed scored a 20/24 on the AM-PAC short mobility form. Current research shows that an AM-PAC score of 18 or greater is typically associated with a discharge to the patient's home setting. Based on the patient's AM-PAC score and their current functional mobility deficits, it is recommended that the patient have 2-3 sessions per week of Physical Therapy at d/c to increase the patient's independence. At this time, this patient demonstrates the endurance and safety to discharge home with home PT and a follow up treatment frequency of 2-3x/wk. Please see assessment section for further patient specific details. If patient discharges prior to next session this note will serve as a discharge summary. Please see below for the latest assessment towards goals. PT Equipment Recommendations  Equipment Needed: Yes  Mobility Devices: Contreras Said: Rolling    Patient Diagnosis(es): The primary encounter diagnosis was Acute pulmonary edema ([address] Diagnoses of Pleural effusion, Hypoxia, and Pneumonia due to infectious organism, unspecified laterality, unspecified part of lung were also pertinent to this visit. Assessment   Assessment: pt tolerated session fair limited by motivation and poor endurance. pt agreeable to ambulaltion in the walker however very fatigued after one ambulation trial and declined further. pt performing mobility with CGA- SBA at . pt plans to return home at [place] and will benefit from initial 24hr A, HHPT, and RW at [place]. Continue PT POC  Activity Tolerance: Patient limited by endurance; Patient limited by fatigue (o2 sats dropping to 88% on 2Lo2 returning to 95% quickly)  Equipment Needed: Yes  Mobility Devices: Walker     Plan    Physcial Therapy Plan  General Plan:  (2-5)  Current Treatment Recommendations: Strengthening;Transfer training;Gait training;Stair training; Endurance training; Safety education & training;Equipment evaluation, education, & procurement; Therapeutic activities     Restrictions  Position Activity Restriction  Other position/activity restrictions: up with assistance     Subjective    Subjective  Subjective: pt supine in bed and agreeable to PT with encouragement  Pain: denies pain  Orientation  Overall Orientation Status: Within Normal Limits  Cognition  Overall Cognitive Status: WFL     Objective   Vitals     Bed Mobility Training  Bed Mobility Training: Yes  Supine to Sit: Supervision (HOB elevated)  Sit to Supine: Supervision  Scooting: Supervision (seated)  Balance  Sitting: Intact (SBA EOB)  Standing: High guard (SBA at [facility])  Transfer Training  Transfer Training: Yes  Sit to Stand: Stand-by assistance (from EOB and recliner at )  Stand to Sit: Stand-by assistance  Bed to Chair: Stand-by assistance (step pivot from recliner to EOB at [facility])  Gait Training  Gait Training: Yes (on 2Lo2 throughout- no SOB noted however pt fatigued at end of ambulation trial and declining further.  o2 sats 88% following ambulation quickly returning to 95%)  Gait  Overall Level of Assistance: Contact-guard assistance;Stand-by assistance  Interventions: Safety awareness training;Verbal cues  Base of Support: Narrowed  Speed/Farrah: Slow;Shuffled  Step Length: Right shortened;Left shortened  Gait Abnormalities: Decreased step clearance  Distance (ft): 125 Feet  Assistive Device: Gait belt;Walker, rollator           Safety Devices  Type of Devices: Call light within reach;Nurse notified;Gait belt;Left in bed;Bed alarm in place       Goals  Short Term Goals  Time Frame for Short Term Goals: D/C  Short Term Goal 1: sit<->stand SUPV  Short Term Goal 2: Amb 150 ft with RW SBA  Short Term Goal 3: pt will tolerate stair assessment  Patient Goals   Patient Goals : to go home    Education  Patient Education  Education Given To: Patient  Education Provided: Role of Therapy;Plan of Care;Transfer Training;Energy Conservation  Education Provided Comments: importance of OOB activity  Education Method: Verbal  Education Outcome: Verbalized understanding    Therapy Time   Individual Concurrent Group Co-treatment   Time In 1435         Time Out 1458         Minutes 23                 Gabe Rust, PT

## 2023-02-16 NOTE — PROGRESS NOTES
Internal Medicine Progress Note    Patient Name: Sher Messina   Patient : 1975   Date: 2023   Admit Date: 2023     CC: Shortness of Breath (Pt complaining of sob x6 days. Was brought in on a NRB and 94%. States he has not been taking his blood thinner as he should)       Subjective     Interval History: Patient had c/o chest tightness, CXR was done to rule out possible pneumothorax. It was wnl. Patient seen at bedside this AM.  He denies any cough, shortness of breath, chest pain, abdominal pain, changes in urinary pattern. Patient states he has not had a bowel movement in 2 days. He denies any abdominal distention and is AOx3. ROS:  As per interval history above. Objective     Vital Signs:  Patient Vitals for the past 8 hrs:   BP Temp Temp src Pulse Resp SpO2 Weight   23 0803 127/84 97.8 °F (36.6 °C) Oral 78 18 99 % --   23 0602 -- -- -- -- -- -- 226 lb 3.1 oz (102.6 kg)   23 0600 -- -- -- 75 -- -- --   23 0425 (!) 145/88 97.9 °F (36.6 °C) Oral 67 18 99 % --   23 0200 -- -- -- 87 -- -- --       Physical Exam:  Physical Exam  Constitutional:       General: He is not in acute distress. Appearance: He is obese. He is not ill-appearing, toxic-appearing or diaphoretic. HENT:      Head: Atraumatic. Nose: Nose normal.      Mouth/Throat:      Mouth: Mucous membranes are dry. Eyes:      Pupils: Pupils are equal, round, and reactive to light. Cardiovascular:      Rate and Rhythm: Normal rate and regular rhythm. Pulses: Normal pulses. Heart sounds: Normal heart sounds. No murmur heard. No friction rub. No gallop. Pulmonary:      Effort: Pulmonary effort is normal. No respiratory distress. Breath sounds: Normal breath sounds. No wheezing or rales. Chest:      Chest wall: No tenderness. Abdominal:      Palpations: Abdomen is soft. Tenderness: There is no abdominal tenderness.  There is no right CVA tenderness, left CVA tenderness or guarding. Comments: Hypoactive bowel sounds  Abdominal distention   Musculoskeletal:      Cervical back: Normal range of motion. Right lower leg: No edema. Left lower leg: No edema. Skin:     Capillary Refill: Capillary refill takes less than 2 seconds. Coloration: Skin is not jaundiced or pale. Findings: No bruising, erythema, lesion or rash. Comments: Asterixis present    Neurological:      General: No focal deficit present. Mental Status: He is alert and oriented to person, place, and time. Mental status is at baseline.    Psychiatric:         Mood and Affect: Mood normal.       Intake/Output Summary (Last 24 hours) at 2/16/2023 0814  Last data filed at 2/16/2023 0602  Gross per 24 hour   Intake 780 ml   Output 910 ml   Net -130 ml        Medications:   thiamine  100 mg Oral Daily    predniSONE  100 mg Oral Daily    ipratropium-albuterol  1 ampule Inhalation Q4H WA    heparin (porcine)  5,000 Units SubCUTAneous 3 times per day    sodium chloride flush  5-40 mL IntraVENous 2 times per day    [Held by provider] valsartan  40 mg Oral 2 times per day    [Held by provider] carvedilol  3.125 mg Oral BID WC    aspirin  81 mg Oral Daily    methadone  45 mg Oral Daily    folic acid  1 mg Oral Daily       dextrose      sodium chloride 100 mL/hr at 02/15/23 0009      glucose, dextrose bolus **OR** dextrose bolus, glucagon (rDNA), dextrose, prochlorperazine, sodium chloride flush, sodium chloride, polyethylene glycol, acetaminophen **OR** acetaminophen, potassium chloride **OR** potassium alternative oral replacement **OR** potassium chloride, magnesium sulfate, perflutren lipid microspheres, aluminum & magnesium hydroxide-simethicone     Labs:  CBC:   Recent Labs     02/14/23  0608 02/15/23  0445 02/16/23  0708   WBC 6.8 6.4 6.2   HGB 7.6* 8.0* 8.6*   HCT 23.5* 25.8* 26.9*    197 252   MCV 99.8 101.8* 98.8       Renal:    Recent Labs     02/14/23  0608 02/15/23  0445 02/16/23  0708    137 138   K 5.0 4.3 4.3    101 101   CO2 28 27 26   BUN 16 15 14   CREATININE 2.9* 2.7* 2.7*   GLUCOSE 87 71 154*   CALCIUM 6.6* 8.3 8.6   ANIONGAP 9 9 11       Hepatic:   Recent Labs     02/15/23  0445   AST 14*   ALT 6*   BILITOT 0.5   BILIDIR <0.2   PROT 5.6*   LABALBU 2.4*   ALKPHOS 62       Troponin: Invalid input(s): TROPONIN    Lactic acid: Invalid input(s): LACTICACID    BNP: No results for input(s): BNP in the last 72 hours. Pro-BNP:   Recent Labs     02/15/23  0445   PROBNP 1,837*       Lipids: No results for input(s): CHOL, TRIG, HDL, LDLCALC, VLDL in the last 72 hours. ABGs:  No results for input(s): PHART, BCM9KCU, PO2ART, HOE1LGR, BEART, THGBART, H9RYUYUM, KKI6BHS in the last 72 hours. VBGs: No results for input(s): PHVEN, KAG2TFB, PO2VEN in the last 72 hours. INR:   Recent Labs     02/15/23  1301   INR 1.35*     aPTT: No results for input(s): APTT in the last 72 hours. Procalcitonin: No results for input(s): PROCAL in the last 72 hours. CRP: No results for input(s): CRP in the last 72 hours. ESR: No results for input(s): ESR in the last 72 hours. Radiology:  XR CHEST PORTABLE   Final Result      Stable appearance of the chest.      XR CHEST PORTABLE   Final Result      Unchanged pulmonary edema and moderate right pleural effusion. XR ABDOMEN (KUB) (SINGLE AP VIEW)   Final Result      Nonobstructive bowel gas pattern. XR CHEST PORTABLE   Final Result      No significant change. XR CHEST PORTABLE   Final Result      Stable appearance of the chest.      XR CHEST PORTABLE   Final Result   1. Slight improvement in multifocal airspace disease. 2.  No significant change in the right pleural effusion. XR CHEST PORTABLE   Final Result   1. No change. XR CHEST PORTABLE   Final Result   1. Persistent right effusion and right lower lung airspace disease without change from prior.    2. Extensive left-sided airspace disease stable to prior. FLUORO FOR SURGICAL PROCEDURES   Final Result      1. Intraprocedure fluoroscopy was provided. XR CHEST 1 VIEW   Final Result      1. Intraprocedure fluoroscopy was provided. XR CHEST PORTABLE   Final Result      1. No change in appearance of bilateral airspace disease. 2.  Right inferior thoracic pigtail catheter likely pleural drain with small to moderate right pleural effusion unchanged. XR CHEST PORTABLE   Final Result   1. As above. XR CHEST PORTABLE   Final Result      1. Stable appearance of the chest with residual right pleural effusion with right chest tube in place and trace associated right basilar pleural gas. 2.  Extensive bilateral airspace disease in the left greater than right lungs. XR CHEST PORTABLE   Final Result      1. Slightly decreased loculated right pleural effusion status post right chest tube placement. There is a small amount of associated right pleural gas. 2.  Persistent diffuse airspace disease in the left lung and right mid and lower lung airspace disease. CT CHEST PULMONARY EMBOLISM W CONTRAST   Final Result      Linear, eccentric, and left leg filling defects in the right lower lobe segmental and subsegmental pulmonary arteries are suggestive of chronic pulmonary emboli. No definite acute pulmonary emboli identified. Extensive groundglass opacification of both lungs is suspicious for atypical pneumonia although a component of asymmetric pulmonary edema could have a similar appearance. Large complex loculated right pleural fluid collection. Recommend diagnostic thoracentesis, as hemothorax or malignant effusion cannot be excluded. Trace left pleural effusion. INCIDENTAL FINDINGS: None. XR CHEST PORTABLE   Final Result      Extensive bilateral airspace disease, which may represent pneumonia or pulmonary edema. Cardiomegaly.       Moderate to large right and questionable small left pleural effusions.          Assessment & Plan   Jeffry Villanueva is a 47 y.o. male, with PMHx of chronic PE on Eliquis, IVDU, Hep C, COPD, HTN who presents to the ED with complaints of SOB for the past 6 or 7 days. Pt has had a nonproductive cough as well.      Acute Hypoxic Respiratory Failure-Improving   Likely 2/2 to pleural effusion vs malignancy  Pt has acute onset shortness of breath that began 6-7 days ago. CT PE shows significant pleural effusions on the right side as well a ground glass opacities. BNP 4462.  6/2022 - patient weight is ~240. Patient this admission is ~225 (down 15 lbs)   Patient unimpressive infectious workup (nl WBC, barely elevated pro alba .19, afebrile).   Trop <0 .01  New O2 requirement (15 HFNC on admission), now on 2.5L, ECHO: 55%-60% EF, unable to eval diastolic fxn, 41 mmhg   -Cards consulted: Dillon Recs  -Pulmonology consulted dillon recs  -awaiting BAL and transbronchial biopsy cytology and culture results  -Continue Zosyn  -wean off oxygen as able      Large complex loculated right pleural fluid  Unknown etiology: culture pending  Tap on 2/8 with LD and Protein concerning for exudative effusion   Ddx: Chronic PE vs PNA vs Hemothorax   - Pulm consulted appreciate recs  - Chest tube discontinued yesterday  - bronch with no acute findings  -awaiting cultures     KARINA  Pre Renal  HRS?  Likely secondary to suspected prerenal, hemodynamic changes as well as decreasing hemoglobin,  FeNa:0.9%  Patient baseline creatinine = 1.0  Today his creatinine jumped to 2.5>2.9>2.7>2.7  -encouraging po intake   -Nephro consult: dillon recs  -strict intake and output UOP: 300     Concern for acute on Chronic hepatitis  Pt has hx of hep C and this morning appears more distended, and has asterixis on exam,  He reported; completed treatment for Hep C in 2021 and also had an hepatic encephalopathy episode the same year  -Hepatitis panel Hep C ab positive, PCR pending   -We will consider Abdominal Ultrasound if  abdominal distension continues to worsens      Chronic PE   CT PE does not show a new PE  Previous admission with + hypercoag workup for showing +homocysteine. .   - ASA  - eliquis held  - Hypercoag labs: B12 nl , folate low, replete    Interstitial Fibrosis  Focal early Fibrosis  -Started on Prednisone 1mg/kg      Acute on Chronic Anemia    on 2/9 Hgb: 7.5>7.6 (hGB:11 2022)  nl B12, folate <2  -folate replete  -Patient also with significant bloody output via Chest tube in past 2 days. -output slowing down today. -If acutely hypotensive or bleeding will check HnH      Hx of IVDU  Pt states that he has not used in a long time and is decreasing the dose of methadone  -45mg Methadone     HTN  - home coreg, holding today  - home valsartan. Holding today    DVT PPx:heparin sq  Diet:  ADULT DIET; Regular;  Low Sodium (2 gm)   Code status:  Full Code     Will discuss with attending physician Dr. Dana Wright MD.     Miguelina Sousa MD  Internal Medicine, PGY-1

## 2023-02-16 NOTE — PROGRESS NOTES
[facility]    [facility]              [phone]                       Plan :     BP is well controlled  Urine is 910 ml ++  Creatinine is stabilizing, 1.0> 1.7 > 2.5 > 2.9 > 2.7       - Agree with changing Vancomycin t  -  DC IVF  -  DC K supplements   - Strict I's and O's  - Avoid nephrotoxic medications ( holding Coreg and Diovan )  - Monitor daily renal        Assessment :     Kate Kaplan is a 56 y.o. male with hx of HTN, DVT, PE, treated Hep C, and COPD on HOD#5 for SOB secondary to right pleural collection (suspected hemothorax). Our services were consulted for ATN. KARINA stage 2, suspected ischemic ATN with superimposed Vancomycin ATN  - Pt with signs of ATN today given acute kidney injury class II with etiologies supporting ATN, ischemic vs. Nephrotoxic. Given the history of intermittent hypotension (now normal) and elevated serum vancomycin level (30.5 on 2/13) preceding the creatinine increase, we suspect ATN due to a combination of these causes. As patient has started to urinate again, we suspect gradual improvement and return to function of the kidneys with normal blood pressure and removal of vancomycin  - Discontinue vancomycin; continue Linezolid as ordered by primary team  - Continue blood pressure control   - continue to hold fluids given normal BP today   - Hold BP medications (carvedilol, valsartan)  - Continue to trend labs    Hypotension (resolved)  Intermittent on admission, but well-controlled today.    - continue to hold fluids given normal BP today  - Hold BP medications (carvedilol, valsartan)  - [facility] would like to thank Shaw Howell MD   for opportunity to serve this patient      Please call with questions at-   24 Hrs Answering service [phone] or  7 am- 5 pm via Perfect serve or cell phone  Cruz Peters MD        CC/reason for consult :     ATN     HPI :     Kate Kaplan is a 56 y.o. male with history of HTN, DVT, PE, treated Hep C, and COPD who presented to the hospital on 2/7/2023 with shortness of breath. On CT chest on 2/8, showed large right pleural fluid collection with suspected hemothorax  secondary to suspected trauma; chest tube was placed on 2/8 with drainage of sanguinous fluid. Pleural fluid analysis showed , total protein of 4.3, glucose 20 suggesting exudative pleural effusion/empyema or hemothorax. Given collection, patient was started on Zosyn (ongoing until 2/16) and vancomycin on 2/9 (completed on 2/12). Patient afebrile, normocardic, and with normal WBC through hospitalization, but with intermittent hypotension as low as the 81-92E systolic. For the past several days, the patient's creatinine has been elevating (2.5 today from 1.0 on 2/10) leading to consultation of Nephrology for concerns of ATN. Interval History:     Patient reports overall doing well with improving SOB. Chest tube continuing to drain small amounts of sanguinous fluid. Patient reports that he has started to urinate again, albeit it being dark in color. Denies any increases in urinary frequency otherwise or any tenderness with urination. Denies any lower back pain.     ROS:     Seen with- Dr. Jeremy Fernandez MD, Raven Hernandez, MS4    positives in bold   Constitutional:  fever, chills, weakness, weight change, fatigue  Skin:  rash, pruritus, hair loss, bruising, dry skin, petechiae  Head, Face, Neck   headaches, swelling,  cervical adenopathy  Respiratory: shortness of breath, cough, or wheezing  Cardiovascular: chest pain, palpitations, dizzy, edema  Gastrointestinal: nausea, vomiting, diarrhea, constipation,belly pain    Yellow skin, blood in stool  Musculoskeletal:  back pain, muscle weakness, gait problems,       joint pain, swelling of the legs  Genitourinary:  dysuria, poor urine flow, flank pain, dark urine  Neurologic:  vertigo, TIA'S, syncope, seizures, focal weakness  Psychosocial:  insomnia, anxiety, or depression. Additional positive findings:                          All other remaining systems are negative. PMH/PSH/SH/Family History:     Past Medical History:   Diagnosis Date    htn     IVDU (intravenous drug user)     PE (pulmonary thromboembolism) ([facility] [address]        Past Surgical History:   Procedure Laterality Date    BRONCHOSCOPY N/A 2/10/2023    BRONCHOSCOPY ALVEOLAR LAVAGE performed by Milena Livingston MD at [address] N/A 2/10/2023    BRONCHOSCOPY/TRANSBRONCHIAL LUNG BIOPSY performed by Milena Livingston MD at [address] [facility] History     Socioeconomic History    Marital status: Single     Spouse name: Not on file    Number of children: Not on file    Years of education: Not on file    Highest education level: Not on file   Occupational History    Not on file   Tobacco Use    Smoking status: Every Day     Packs/day: 0.50     Types: Cigarettes    Smokeless tobacco: Never   Substance and Sexual Activity    Alcohol use: Not Currently    Drug use: Not Currently     Types: Opiates     Sexual activity: Not on file   Other Topics Concern    Not on file   Social History Narrative    Not on file     Social Determinants of Health     Financial Resource Strain: Not on file   Food Insecurity: Not on file   Transportation Needs: Not on file   Physical Activity: Not on file   Stress: Not on file   Social Connections: Not on file   Intimate Partner Violence: Not on file   Housing Stability: Not on file       History reviewed. No pertinent family history.        Medication:     Scheduled Meds:   thiamine  100 mg Oral Daily    predniSONE  100 mg Oral Daily    ipratropium-albuterol  1 ampule Inhalation Q4H WA    heparin (porcine)  5,000 Units SubCUTAneous 3 times per day    sodium chloride flush  5-40 mL IntraVENous 2 times per day    [Held by provider] valsartan  40 mg Oral 2 times per day    [Held by provider] carvedilol  3.125 mg Oral BID WC    aspirin  81 mg Oral Daily    methadone  45 mg Oral Daily    folic acid  1 mg Oral Daily     Continuous Infusions:   dextrose      sodium chloride 100 mL/hr at 02/15/23 0009     PRN Meds:.glucose, dextrose bolus **OR** dextrose bolus, glucagon (rDNA), dextrose, prochlorperazine, sodium chloride flush, sodium chloride, polyethylene glycol, acetaminophen **OR** acetaminophen, potassium chloride **OR** potassium alternative oral replacement **OR** potassium chloride, magnesium sulfate, perflutren lipid microspheres, aluminum & magnesium hydroxide-simethicone       Vitals :     Vitals:    02/16/23 0915   BP:    Pulse: 87   Resp:    Temp:    SpO2: 96%       I & O :       Intake/Output Summary (Last 24 hours) at 2/16/2023 3167  Last data filed at 2/16/2023 0602  Gross per 24 hour   Intake 780 ml   Output 910 ml   Net -130 ml          Physical Examination :     General appearance: Anxious- no, distressed- no, in good spirits- yes  HEENT: Lips- normal, teeth- ok , oral mucosa- moist  Neck : Mass- no, appears symmetrical, JVD- not visible  Respiratory: Respiratory effort-  normal, wheeze- no, crackles - no. Patient with right chest tube present. Cardiovascular:  Ausculation- No M/R/G, 1+ pitting edema of the bilateral lower extremities and feet  Abdomen: visible mass- no, distention- no, scar- no, tenderness- no                            hepatosplenomegaly-  no  Musculoskeletal:  clubbing no,cyanosis- no , digital ischemia- no                           muscle strength- grossly normal , tone - grossly normal  Skin: rashes- no , ulcers- no, induration- no, tightening - no  Psychiatric:  Judgement and insight- normal           AAO X 3     LABS:     Recent Labs     02/14/23  0608 02/15/23  0445 02/16/23  0708   WBC 6.8 6.4 6.2   HGB 7.6* 8.0* 8.6*   HCT 23.5* 25.8* 26.9*    197 252       Recent Labs     02/14/23  0608 02/15/23  0445 02/16/23  0708    137 138   K 5.0 4.3 4.3    101 101   CO2 28 27 26   BUN 16 15 14   CREATININE 2.9* 2.7* 2.7*   GLUCOSE 87 71 154*          Will discuss with Dr. Alice Reardon MD  Internal Medicine, PGY3    Ricki Tran  Internal Medicine, MS4        Patient was seen and examined and the case was discussed with the resident. He acted as my scribe. I agree with the assessment and plan.     Thanks  Nephrology  Stevan [address]  Office: [phone]  Cell: [phone]  Fax: [phone]

## 2023-02-16 NOTE — PROGRESS NOTES
Pulmonary Followup Note    CC: Pneumonitis    Interval History:    Chest tightness overnight, O2 increased to 3L from 1L, however satting very well. CXR taken, no significant changes. Abs completed, continues on prednisone 1 mg/kg    Of note, given finding of early focal fibrosis of lung. History of environmental/drug/work related exposure was inquired about. 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 He reports exposure to cleaning chemicals at both jobs. More importantly, he reports history of polysubstance abuse, including smoking cocaine, crack as well as injecting heroin. Denies current use of illegal drugs, but endorses smoking few cigarettes on most days. thiamine  100 mg Oral Daily    predniSONE  100 mg Oral Daily    ipratropium-albuterol  1 ampule Inhalation Q4H WA    heparin (porcine)  5,000 Units SubCUTAneous 3 times per day    sodium chloride flush  5-40 mL IntraVENous 2 times per day    [Held by provider] valsartan  40 mg Oral 2 times per day    [Held by provider] carvedilol  3.125 mg Oral BID WC    aspirin  81 mg Oral Daily    methadone  45 mg Oral Daily    folic acid  1 mg Oral Daily     PHYSICAL EXAMINATION:  /84   Pulse 87   Temp 97.8 °F (36.6 °C) (Oral)   Resp 18   Ht 5' 10\" (1.778 m)   Wt 226 lb 3.1 oz (102.6 kg)   SpO2 96%   BMI 32.46 kg/m²   CURRENT PULSE OXIMETRY:  SpO2: 96 %  24HR PULSE OXIMETRY RANGE:  SpO2  Av.1 %  Min: 93 %  Max: 99 % on 2LNC      Gen: No acute distress. Speaking in full sentences with no issue at rest.   HEENT: PERRL, EOMI, OP nl  Lung: Diminished breath sounds, minimal wheezing  Chest: Chest tube in place, no surrounding erythema  CV: RRR without M/R/R  Abd: +BS, soft, NT/ND  Ext: No edema.     DATA  CBC:   Recent Labs     23  0608 02/15/23  0445 23  0708   WBC 6.8 6.4 6.2   HGB 7.6* 8.0* 8.6*   HCT 23.5* 25.8* 26.9*   MCV 99.8 101.8* 98.8   PLT 185 197 252       BMP:   Recent Labs     02/14/23  0608 02/15/23  0445 02/16/23  0708    137 138   K 5.0 4.3 4.3    101 101   CO2 28 27 26   BUN 16 15 14   CREATININE 2.9* 2.7* 2.7*       No results for input(s): PHART, APO8TXS, PO2ART in the last 72 hours. LIVER PROFILE:   Recent Labs     02/15/23  0445   AST 14*   ALT 6*   BILIDIR <0.2   BILITOT 0.5   ALKPHOS 62         CXR REVIEWED BY ME AND SHOWED:  XR CHEST PORTABLE   Final Result      Stable appearance of the chest.      XR CHEST PORTABLE   Final Result      Unchanged pulmonary edema and moderate right pleural effusion. XR ABDOMEN (KUB) (SINGLE AP VIEW)   Final Result      Nonobstructive bowel gas pattern. XR CHEST PORTABLE   Final Result      No significant change. XR CHEST PORTABLE   Final Result      Stable appearance of the chest.      XR CHEST PORTABLE   Final Result   1. Slight improvement in multifocal airspace disease. 2.  No significant change in the right pleural effusion. XR CHEST PORTABLE   Final Result   1. No change. XR CHEST PORTABLE   Final Result   1. Persistent right effusion and right lower lung airspace disease without change from prior. 2. Extensive left-sided airspace disease stable to prior. FLUORO FOR SURGICAL PROCEDURES   Final Result      1. Intraprocedure fluoroscopy was provided. XR CHEST 1 VIEW   Final Result      1. Intraprocedure fluoroscopy was provided. XR CHEST PORTABLE   Final Result      1. No change in appearance of bilateral airspace disease. 2.  Right inferior thoracic pigtail catheter likely pleural drain with small to moderate right pleural effusion unchanged. XR CHEST PORTABLE   Final Result   1. As above. XR CHEST PORTABLE   Final Result      1. Stable appearance of the chest with residual right pleural effusion with right chest tube in place and trace associated right basilar pleural gas.    2.  Extensive bilateral airspace disease in the left greater than right lungs. XR CHEST PORTABLE   Final Result      1. Slightly decreased loculated right pleural effusion status post right chest tube placement. There is a small amount of associated right pleural gas. 2.  Persistent diffuse airspace disease in the left lung and right mid and lower lung airspace disease. CT CHEST PULMONARY EMBOLISM W CONTRAST   Final Result      Linear, eccentric, and left leg filling defects in the right lower lobe segmental and subsegmental pulmonary arteries are suggestive of chronic pulmonary emboli. No definite acute pulmonary emboli identified. Extensive groundglass opacification of both lungs is suspicious for atypical pneumonia although a component of asymmetric pulmonary edema could have a similar appearance. Large complex loculated right pleural fluid collection. Recommend diagnostic thoracentesis, as hemothorax or malignant effusion cannot be excluded. Trace left pleural effusion. INCIDENTAL FINDINGS: None. XR CHEST PORTABLE   Final Result      Extensive bilateral airspace disease, which may represent pneumonia or pulmonary edema. Cardiomegaly. Moderate to large right and questionable small left pleural effusions. ASSESSMENT/PLAN:    Right hemothorax  Organizing PNA/Focal early fibrosis of uncertain etiology  - Cultures remaining negative  -Transbronchial biopsy shows organizing pneumonia, focal early fibrosis, no malignancy/granulomatous  - on Zosyn (2/9 - completed  - started on Prednisone 1 mgkg for fibrosis  - possible that illegal drugs can lead to fibrosis of this pattern? Patient has been discussed and staffed with Dr. Chuckie Carbajal MD  PGY-1  Patient examined, findings as discussed with Dr. Ricco Hester. Agree with assessment and plan. No clinical change in first 24 hours on systemic steroid therapy for organizing pneumonia. Etiology of this is possibly crack cocaine ingestion.   Response to steroids may be best measured on an outpatient basis. He is ambulatory now, with chest tube removed, and will need evaluation for home oxygen therapy.

## 2023-02-16 NOTE — PLAN OF CARE
Problem: Safety - Adult  Goal: Free from fall injury  2/16/2023 0229 by Junie Salmeron RN  Outcome: Progressing  Flowsheets (Taken 2/16/2023 0229)  Free From Fall Injury:   Instruct family/caregiver on patient safety   Based on caregiver fall risk screen, instruct family/caregiver to ask for assistance with transferring infant if caregiver noted to have fall risk factors  Note: All fall precautions in place. Pt free from injury. Problem: Respiratory - Adult  Goal: Achieves optimal ventilation and oxygenation  2/16/2023 0229 by Junie Salmeron RN  Outcome: Progressing  Flowsheets (Taken 2/16/2023 0229)  Achieves optimal ventilation and oxygenation:   Assess for changes in respiratory status   Assess for changes in mentation and behavior   Assess and instruct to report shortness of breath or any respiratory difficulty   Respiratory therapy support as indicated  Note: Pt reporting some chest tightness. MD notified new orders in. Pt reports some relief. Will continue to monitor. Problem: Pain  Goal: Verbalizes/displays adequate comfort level or baseline comfort level  2/16/2023 0229 by Junie Salmeron RN  Outcome: Progressing  Flowsheets (Taken 2/16/2023 0229)  Verbalizes/displays adequate comfort level or baseline comfort level:   Encourage patient to monitor pain and request assistance   Assess pain using appropriate pain scale  Note: No pain reported from pt.

## 2023-02-16 NOTE — PLAN OF CARE
Problem: Discharge Planning  Goal: Discharge to home or other facility with appropriate resources  Outcome: Progressing     Problem: Safety - Adult  Goal: Free from fall injury  2/16/2023 0930 by Reymundo Brady RN  Outcome: Progressing  2/16/2023 0229 by Heriberto Morrison RN  Outcome: Progressing  Flowsheets (Taken 2/16/2023 0229)  Free From Fall Injury:   Instruct family/caregiver on patient safety   Based on caregiver fall risk screen, instruct family/caregiver to ask for assistance with transferring infant if caregiver noted to have fall risk factors  Note: All fall precautions in place. Pt free from injury. Problem: ABCDS Injury Assessment  Goal: Absence of physical injury  Outcome: Progressing     Problem: Respiratory - Adult  Goal: Achieves optimal ventilation and oxygenation  2/16/2023 0930 by Reymundo Brady RN  Outcome: Progressing  2/16/2023 0229 by Heriberto Morrison RN  Outcome: Progressing  Flowsheets (Taken 2/16/2023 0229)  Achieves optimal ventilation and oxygenation:   Assess for changes in respiratory status   Assess for changes in mentation and behavior   Assess and instruct to report shortness of breath or any respiratory difficulty   Respiratory therapy support as indicated  Note: Pt reporting some chest tightness. MD notified new orders in. Pt reports some relief. Will continue to monitor.       Problem: Cardiovascular - Adult  Goal: Maintains optimal cardiac output and hemodynamic stability  Outcome: Progressing  Goal: Absence of cardiac dysrhythmias or at baseline  Outcome: Progressing     Problem: Metabolic/Fluid and Electrolytes - Adult  Goal: Electrolytes maintained within normal limits  Outcome: Progressing  Goal: Hemodynamic stability and optimal renal function maintained  Outcome: Progressing     Problem: Pain  Goal: Verbalizes/displays adequate comfort level or baseline comfort level  2/16/2023 0930 by Reymundo Brady RN  Outcome: Progressing  2/16/2023 0229 by Heriberto Morrison RN  Outcome: Progressing  Flowsheets (Taken 2/16/2023 0229)  Verbalizes/displays adequate comfort level or baseline comfort level:   Encourage patient to monitor pain and request assistance   Assess pain using appropriate pain scale  Note: No pain reported from pt. Problem: Skin/Tissue Integrity  Goal: Absence of new skin breakdown  Description: 1. Monitor for areas of redness and/or skin breakdown  2. Assess vascular access sites hourly  3. Every 4-6 hours minimum:  Change oxygen saturation probe site  4. Every 4-6 hours:  If on nasal continuous positive airway pressure, respiratory therapy assess nares and determine need for appliance change or resting period.   Outcome: Progressing

## 2023-02-16 NOTE — RT PROTOCOL NOTE
RT Nebulizer Bronchodilator Protocol Note    There is a bronchodilator order in the chart from a provider indicating to follow the RT Bronchodilator Protocol and there is an Initiate RT Bronchodilator Protocol order as well (see protocol at bottom of note). CXR Findings:  XR CHEST PORTABLE    Result Date: 2/15/2023  No significant change. XR CHEST PORTABLE    Result Date: 2/14/2023  Stable appearance of the chest.      The findings from the last RT Protocol Assessment were as follows:  Smoking: Chronic pulmonary disease  Respiratory Pattern: Regular pattern and RR 12-20 bpm  Breath Sounds: Intermittent or unilateral wheezes  Cough: Strong, productive  Indication for Bronchodilator Therapy: Decreased or absent breath sounds, Wheezing associated with pulm disorder  Bronchodilator Assessment Score: 7    Duoneb HHN TID based on assessment  Aerosolized bronchodilator medication orders have been revised according to the RT Nebulizer Bronchodilator Protocol below. Respiratory Therapist to perform RT Therapy Protocol Assessment initially then follow the protocol. Repeat RT Therapy Protocol Assessment PRN for score 0-3 or on second treatment, BID, and PRN for scores above 3. No Indications - adjust the frequency to every 6 hours PRN wheezing or bronchospasm, if no treatments needed after 48 hours then discontinue using Per Protocol order mode. If indication present, adjust the RT bronchodilator orders based on the Bronchodilator Assessment Score as indicated below. If a patient is on this medication at home then do not decrease Frequency below that used at home. 0-3 - enter or revise RT bronchodilator order(s) to equivalent RT Bronchodilator order with Frequency of every 4 hours PRN for wheezing or increased work of breathing using Per Protocol order mode.        4-6 - enter or revise RT Bronchodilator order(s) to two equivalent RT bronchodilator orders with one order with BID Frequency and one order with Frequency of every 4 hours PRN wheezing or increased work of breathing using Per Protocol order mode. 7-10 - enter or revise RT Bronchodilator order(s) to two equivalent RT bronchodilator orders with one order with TID Frequency and one order with Frequency of every 4 hours PRN wheezing or increased work of breathing using Per Protocol order mode. 11-13 - enter or revise RT Bronchodilator order(s) to one equivalent RT bronchodilator order with QID Frequency and an Albuterol order with Frequency of every 4 hours PRN wheezing or increased work of breathing using Per Protocol order mode. Greater than 13 - enter or revise RT Bronchodilator order(s) to one equivalent RT bronchodilator order with every 4 hours Frequency and an Albuterol order with Frequency of every 2 hours PRN wheezing or increased work of breathing using Per Protocol order mode. RT to enter RT Home Evaluation for COPD & MDI Assessment order using Per Protocol order mode.     Electronically signed by Richard Ngo RCP on 2/15/2023 at 9:32 PM

## 2023-02-17 LAB
ANION GAP SERPL CALCULATED.3IONS-SCNC: 8 MMOL/L (ref 3–16)
BASOPHILS ABSOLUTE: 0 K/UL (ref 0–0.2)
BASOPHILS RELATIVE PERCENT: 0.5 %
BUN BLDV-MCNC: 19 MG/DL (ref 7–20)
CALCIUM SERPL-MCNC: 8.8 MG/DL (ref 8.3–10.6)
CHLORIDE BLD-SCNC: 100 MMOL/L (ref 99–110)
CO2: 29 MMOL/L (ref 21–32)
CREAT SERPL-MCNC: 2.6 MG/DL (ref 0.9–1.3)
EKG ATRIAL RATE: 72 BPM
EKG DIAGNOSIS: NORMAL
EKG P AXIS: 14 DEGREES
EKG P-R INTERVAL: 136 MS
EKG Q-T INTERVAL: 410 MS
EKG QRS DURATION: 92 MS
EKG QTC CALCULATION (BAZETT): 448 MS
EKG R AXIS: 30 DEGREES
EKG T AXIS: -21 DEGREES
EKG VENTRICULAR RATE: 72 BPM
EOSINOPHILS ABSOLUTE: 0 K/UL (ref 0–0.6)
EOSINOPHILS RELATIVE PERCENT: 0.1 %
GFR SERPL CREATININE-BSD FRML MDRD: 30 ML/MIN/{1.73_M2}
GLUCOSE BLD-MCNC: 107 MG/DL (ref 70–99)
GLUCOSE BLD-MCNC: 111 MG/DL (ref 70–99)
GLUCOSE BLD-MCNC: 159 MG/DL (ref 70–99)
GLUCOSE BLD-MCNC: 178 MG/DL (ref 70–99)
HCT VFR BLD CALC: 23.8 % (ref 40.5–52.5)
HCT VFR BLD CALC: 24.4 % (ref 40.5–52.5)
HEMOGLOBIN: 7.5 G/DL (ref 13.5–17.5)
HEMOGLOBIN: 7.6 G/DL (ref 13.5–17.5)
LYMPHOCYTES ABSOLUTE: 0.5 K/UL (ref 1–5.1)
LYMPHOCYTES RELATIVE PERCENT: 6.3 %
MCH RBC QN AUTO: 31.1 PG (ref 26–34)
MCHC RBC AUTO-ENTMCNC: 31.1 G/DL (ref 31–36)
MCV RBC AUTO: 99.8 FL (ref 80–100)
MONOCYTES ABSOLUTE: 1 K/UL (ref 0–1.3)
MONOCYTES RELATIVE PERCENT: 11.4 %
NEUTROPHILS ABSOLUTE: 7.1 K/UL (ref 1.7–7.7)
NEUTROPHILS RELATIVE PERCENT: 81.7 %
PDW BLD-RTO: 18.3 % (ref 12.4–15.4)
PERFORMED ON: ABNORMAL
PLATELET # BLD: 240 K/UL (ref 135–450)
PMV BLD AUTO: 10.6 FL (ref 5–10.5)
POTASSIUM SERPL-SCNC: 3.8 MMOL/L (ref 3.5–5.1)
RBC # BLD: 2.45 M/UL (ref 4.2–5.9)
SODIUM BLD-SCNC: 137 MMOL/L (ref 136–145)
WBC # BLD: 8.6 K/UL (ref 4–11)

## 2023-02-17 PROCEDURE — 6360000002 HC RX W HCPCS

## 2023-02-17 PROCEDURE — 6370000000 HC RX 637 (ALT 250 FOR IP): Performed by: STUDENT IN AN ORGANIZED HEALTH CARE EDUCATION/TRAINING PROGRAM

## 2023-02-17 PROCEDURE — 94761 N-INVAS EAR/PLS OXIMETRY MLT: CPT

## 2023-02-17 PROCEDURE — 99232 SBSQ HOSP IP/OBS MODERATE 35: CPT | Performed by: INTERNAL MEDICINE

## 2023-02-17 PROCEDURE — 85025 COMPLETE CBC W/AUTO DIFF WBC: CPT

## 2023-02-17 PROCEDURE — 6370000000 HC RX 637 (ALT 250 FOR IP)

## 2023-02-17 PROCEDURE — 94640 AIRWAY INHALATION TREATMENT: CPT

## 2023-02-17 PROCEDURE — 36415 COLL VENOUS BLD VENIPUNCTURE: CPT

## 2023-02-17 PROCEDURE — 2700000000 HC OXYGEN THERAPY PER DAY

## 2023-02-17 PROCEDURE — 93010 ELECTROCARDIOGRAM REPORT: CPT | Performed by: INTERNAL MEDICINE

## 2023-02-17 PROCEDURE — 97535 SELF CARE MNGMENT TRAINING: CPT

## 2023-02-17 PROCEDURE — 6360000002 HC RX W HCPCS: Performed by: STUDENT IN AN ORGANIZED HEALTH CARE EDUCATION/TRAINING PROGRAM

## 2023-02-17 PROCEDURE — 85018 HEMOGLOBIN: CPT

## 2023-02-17 PROCEDURE — 6370000000 HC RX 637 (ALT 250 FOR IP): Performed by: INTERNAL MEDICINE

## 2023-02-17 PROCEDURE — 97530 THERAPEUTIC ACTIVITIES: CPT

## 2023-02-17 PROCEDURE — 2580000003 HC RX 258: Performed by: INTERNAL MEDICINE

## 2023-02-17 PROCEDURE — 2060000000 HC ICU INTERMEDIATE R&B

## 2023-02-17 PROCEDURE — 80048 BASIC METABOLIC PNL TOTAL CA: CPT

## 2023-02-17 PROCEDURE — 85014 HEMATOCRIT: CPT

## 2023-02-17 RX ORDER — PANTOPRAZOLE SODIUM 40 MG/1
40 TABLET, DELAYED RELEASE ORAL
Status: DISCONTINUED | OUTPATIENT
Start: 2023-02-17 | End: 2023-02-19 | Stop reason: HOSPADM

## 2023-02-17 RX ORDER — PANTOPRAZOLE SODIUM 40 MG/1
40 TABLET, DELAYED RELEASE ORAL
Status: DISCONTINUED | OUTPATIENT
Start: 2023-02-18 | End: 2023-02-17

## 2023-02-17 RX ORDER — ENOXAPARIN SODIUM 100 MG/ML
30 INJECTION SUBCUTANEOUS 2 TIMES DAILY
Status: DISCONTINUED | OUTPATIENT
Start: 2023-02-17 | End: 2023-02-19 | Stop reason: HOSPADM

## 2023-02-17 RX ADMIN — SODIUM CHLORIDE, PRESERVATIVE FREE 10 ML: 5 INJECTION INTRAVENOUS at 08:59

## 2023-02-17 RX ADMIN — PANTOPRAZOLE SODIUM 40 MG: 40 TABLET, DELAYED RELEASE ORAL at 14:29

## 2023-02-17 RX ADMIN — IPRATROPIUM BROMIDE AND ALBUTEROL SULFATE 1 AMPULE: 2.5; .5 SOLUTION RESPIRATORY (INHALATION) at 08:42

## 2023-02-17 RX ADMIN — IPRATROPIUM BROMIDE AND ALBUTEROL SULFATE 1 AMPULE: 2.5; .5 SOLUTION RESPIRATORY (INHALATION) at 15:39

## 2023-02-17 RX ADMIN — ENOXAPARIN SODIUM 30 MG: 100 INJECTION SUBCUTANEOUS at 21:21

## 2023-02-17 RX ADMIN — Medication 45 MG: at 09:23

## 2023-02-17 RX ADMIN — FOLIC ACID 1 MG: 1 TABLET ORAL at 09:00

## 2023-02-17 RX ADMIN — ENOXAPARIN SODIUM 30 MG: 100 INJECTION SUBCUTANEOUS at 14:29

## 2023-02-17 RX ADMIN — PREDNISONE 100 MG: 50 TABLET ORAL at 08:59

## 2023-02-17 RX ADMIN — IPRATROPIUM BROMIDE AND ALBUTEROL SULFATE 1 AMPULE: 2.5; .5 SOLUTION RESPIRATORY (INHALATION) at 21:32

## 2023-02-17 RX ADMIN — SODIUM CHLORIDE, PRESERVATIVE FREE 10 ML: 5 INJECTION INTRAVENOUS at 21:21

## 2023-02-17 RX ADMIN — THIAMINE HCL TAB 100 MG 100 MG: 100 TAB at 08:59

## 2023-02-17 RX ADMIN — ASPIRIN 81 MG 81 MG: 81 TABLET ORAL at 08:59

## 2023-02-17 RX ADMIN — IPRATROPIUM BROMIDE AND ALBUTEROL SULFATE 1 AMPULE: 2.5; .5 SOLUTION RESPIRATORY (INHALATION) at 12:13

## 2023-02-17 RX ADMIN — HEPARIN SODIUM 5000 UNITS: 5000 INJECTION INTRAVENOUS; SUBCUTANEOUS at 04:47

## 2023-02-17 ASSESSMENT — PAIN SCALES - WONG BAKER
WONGBAKER_NUMERICALRESPONSE: 0

## 2023-02-17 ASSESSMENT — PAIN SCALES - GENERAL
PAINLEVEL_OUTOF10: 0

## 2023-02-17 NOTE — PROGRESS NOTES
Nutrition Note       NUTRITION RECOMMENDATIONS:   1. PO Diet: Continue current diet low sodium   2. ONS: n/a    NUTRITION ASSESSMENT:  Nutritional summary & status: Length of stay. Pt previously given heart healthy diet education. Most recent intakes show pt has good appetite eating % of meal. Pt with no wt loss pta. Pt to be NPO at midnight. Recommend advancing back to low sodium diet after NPO. No nutrition intervention needed at this time. Admission/PMH: 57 yo M w/ pmhx of chronic PE on Eliquis, IVDU, Hep C, COPD, HTN who presents to the ED with complaints of SOB for the past 6 or 7 days. Pt has had a nonproductive cough as well. Pt denies chest pain, nausea, vomiting, changes in bowel habits    MALNUTRITION ASSESSMENT  Context of Malnutrition: Chronic Illness   Malnutrition Status: No malnutrition    NUTRITION DIAGNOSIS   No nutrition diagnosis at this time     [facility] and/or Nutrient Delivery:  Continue Current Diet  Nutrition Education/Counseling:  Education completed       The patient will still be monitored per nutrition standards of care. Consult dietitian if nutrition interventions essential to patient care is needed.      Estrella Hernandez, [facility]:  [phone]  Office:  [phone]

## 2023-02-17 NOTE — PROGRESS NOTES
[address] Daily Progress Note      Admit Date:  2/7/2023    Subjective:  Mr. Costa Cano was seen and examined. F/U CHF. Breathing about same. No chest pain.      Objective:   /78   Pulse 86   Temp 98.3 °F (36.8 °C) (Oral)   Resp 16   Ht 5' 10\" (1.778 m)   Wt 227 lb 8.2 oz (103.2 kg)   SpO2 94%   BMI 32.65 kg/m²     Intake/Output Summary (Last 24 hours) at 2/17/2023 1434  Last data filed at 2/17/2023 1234  Gross per 24 hour   Intake 791 ml   Output 75 ml   Net 716 ml       TELEMETRY: Sinus     Physical Exam:  General:  Awake, alert, NAD  Skin:  Warm and dry  Neck:  JVP difficult  Chest:  decreased BS,crackles, respiration normal at rest.  Cardiovascular:  RRR S1S2  Abdomen:  Soft nontender  Extremities:  no edema    Medications:    enoxaparin  30 mg SubCUTAneous BID    pantoprazole  40 mg Oral BID AC    thiamine  100 mg Oral Daily    predniSONE  100 mg Oral Daily    ipratropium-albuterol  1 ampule Inhalation Q4H WA    sodium chloride flush  5-40 mL IntraVENous 2 times per day    aspirin  81 mg Oral Daily    methadone  45 mg Oral Daily    folic acid  1 mg Oral Daily      dextrose      sodium chloride 100 mL/hr at 02/15/23 0009     glucose, dextrose bolus **OR** dextrose bolus, glucagon (rDNA), dextrose, prochlorperazine, sodium chloride flush, sodium chloride, polyethylene glycol, acetaminophen **OR** acetaminophen, potassium chloride **OR** potassium alternative oral replacement **OR** potassium chloride, magnesium sulfate, perflutren lipid microspheres, aluminum & magnesium hydroxide-simethicone    Lab Data:  CBC:   Recent Labs     02/15/23  0445 02/16/23  0708 02/17/23  0509   WBC 6.4 6.2 8.6   HGB 8.0* 8.6* 7.6*   HCT 25.8* 26.9* 24.4*   .8* 98.8 99.8    252 240     BMP:   Recent Labs     02/15/23  0445 02/16/23  0708 02/17/23  0509    138 137   K 4.3 4.3 3.8    101 100   CO2 27 26 29   BUN 15 14 19   CREATININE 2.7* 2.7* 2.6*     LIVER PROFILE:   Recent Labs 02/15/23  0445   AST 14*   ALT 6*   BILIDIR <0.2   BILITOT 0.5   ALKPHOS 62     PT/INR:   Recent Labs     02/15/23  1301   PROTIME 16.6*   INR 1.35*       APTT: No results for input(s): APTT in the last 72 hours. BNP:  No results for input(s): BNP in the last 72 hours. IMAGING:     Assessment:  Patient Active Problem List    Diagnosis Date Noted    Organizing pneumonia ([facility] 02/15/2023    Hypoxemia 02/11/2023    Acute heart failure, unspecified heart failure type ([facility] 02/08/2023    Normocytic anemia 02/08/2023    Chronic anticoagulation 02/08/2023    HTN (hypertension) 02/08/2023    Opioid dependence on agonist therapy ([facility] 02/08/2023    Hx pulmonary embolism 02/08/2023    Acute pulmonary edema ([facility] 02/08/2023    Hemothorax on right 02/08/2023    Pleural effusion 02/08/2023       Plan:  Breathing some better. O2 requirements better. No chest pain since 2 days ago. Plueritic/atypical. Coreg/diovan on hold, bp marginal. Cr stable. Pulmonary following. Nothing to add. Will follow from a distance.        Core Measures:  Discharge instructions:   LVEF documented:   ACEI for LV dysfunction:   Smoking Cessation:    Reyna Mgiuel MD, MD 2/17/2023 2:34 PM

## 2023-02-17 NOTE — PROGRESS NOTES
[facility] NEPHROLOGY    [facility]              [phone]                       Plan :     BP is well controlled  Urine is ?   Creatinine is stabilizing, 1.0> 1.7 > 2.5 > 2.9 > 2.7 > 2.6      -  Vancomycin DC  -  DC IVF  -  DC K supplements   - Avoid nephrotoxic medications ( DC Coreg and Diovan )  - can be DC in am if creatinine continues to improve with follow up in 1-2 weeks with me          Assessment :     Jeffry Villanueva is a 47 y.o. male with hx of HTN, DVT, PE, treated Hep C, and COPD on HOD#5 for SOB secondary to right pleural collection (suspected hemothorax). Our services were consulted for ATN.     KARINA stage 2, suspected ischemic ATN with superimposed Vancomycin ATN  - Pt with signs of ATN today given acute kidney injury class II with etiologies supporting ATN, ischemic vs. Nephrotoxic. Given the history of intermittent hypotension (now normal) and elevated serum vancomycin level (30.5 on 2/13) preceding the creatinine increase, we suspect ATN due to a combination of these causes. As patient has started to urinate again, we suspect gradual improvement and return to function of the kidneys with normal blood pressure and removal of vancomycin  - Discontinue vancomycin; continue Linezolid as ordered by primary team  - Continue blood pressure control   - continue to hold fluids given normal BP today   - Hold BP medications (carvedilol, valsartan)  - Continue to trend labs    Hypotension (resolved)  Intermittent on admission, but well-controlled today.   - continue to hold fluids given normal BP today  - Hold BP medications (carvedilol, valsartan)  - Monitor VS    [facility] Nephrology would like to thank Adams Mathew MD   for opportunity to serve this patient      Please call with questions at-   24 Hrs Answering service [phone] or  7 am- 5 pm via Perfect serve or cell phone  Dr.Muhammad Lina Vásquez MD        CC/reason for consult :     ATN     HPI :     Jeffry Villanueva is a 47 y.o. male  with history of HTN, DVT, PE, treated Hep C, and COPD who presented to the hospital on 2/7/2023 with shortness of breath. On CT chest on 2/8, showed large right pleural fluid collection with suspected hemothorax  secondary to suspected trauma; chest tube was placed on 2/8 with drainage of sanguinous fluid. Pleural fluid analysis showed , total protein of 4.3, glucose 20 suggesting exudative pleural effusion/empyema or hemothorax. Given collection, patient was started on Zosyn (ongoing until 2/16) and vancomycin on 2/9 (completed on 2/12). Patient afebrile, normocardic, and with normal WBC through hospitalization, but with intermittent hypotension as low as the 07-58Q systolic. For the past several days, the patient's creatinine has been elevating (2.5 today from 1.0 on 2/10) leading to consultation of Nephrology for concerns of ATN. Interval History:     Patient reports overall doing well with improving SOB. Chest tube continuing to drain small amounts of sanguinous fluid. Patient reports that he has started to urinate again, albeit it being dark in color. Denies any increases in urinary frequency otherwise or any tenderness with urination. Denies any lower back pain.     ROS:     Seen with- Dr. Karl Newman MD, Ewelina Redmond, MS4    positives in bold   Constitutional:  fever, chills, weakness, weight change, fatigue  Skin:  rash, pruritus, hair loss, bruising, dry skin, petechiae  Head, Face, Neck   headaches, swelling,  cervical adenopathy  Respiratory: shortness of breath, cough, or wheezing  Cardiovascular: chest pain, palpitations, dizzy, edema  Gastrointestinal: nausea, vomiting, diarrhea, constipation,belly pain    Yellow skin, blood in stool  Musculoskeletal:  back pain, muscle weakness, gait problems,       joint pain, swelling of the legs  Genitourinary:  dysuria, poor urine flow, flank pain, dark urine  Neurologic:  vertigo, TIA'S, syncope, seizures, focal weakness  Psychosocial:  insomnia, anxiety, or depression. Additional positive findings:                          All other remaining systems are negative. PMH/PSH/SH/Family History:     Past Medical History:   Diagnosis Date    htn     IVDU (intravenous drug user)     PE (pulmonary thromboembolism) ([facility] [address]        Past Surgical History:   Procedure Laterality Date    BRONCHOSCOPY N/A 2/10/2023    BRONCHOSCOPY ALVEOLAR LAVAGE performed by Milena Livingston MD at [address] N/A 2/10/2023    BRONCHOSCOPY/TRANSBRONCHIAL LUNG BIOPSY performed by Milena Livingston MD at [address] [facility] History     Socioeconomic History    Marital status: Single     Spouse name: Not on file    Number of children: Not on file    Years of education: Not on file    Highest education level: Not on file   Occupational History    Not on file   Tobacco Use    Smoking status: Every Day     Packs/day: 0.50     Types: Cigarettes    Smokeless tobacco: Never   Substance and Sexual Activity    Alcohol use: Not Currently    Drug use: Not Currently     Types: Opiates     Sexual activity: Not on file   Other Topics Concern    Not on file   Social History Narrative    Not on file     Social Determinants of Health     Financial Resource Strain: Not on file   Food Insecurity: Not on file   Transportation Needs: Not on file   Physical Activity: Not on file   Stress: Not on file   Social Connections: Not on file   Intimate Partner Violence: Not on file   Housing Stability: Not on file       History reviewed. No pertinent family history.        Medication:     Scheduled Meds:   thiamine  100 mg Oral Daily    predniSONE  100 mg Oral Daily    ipratropium-albuterol  1 ampule Inhalation Q4H WA    heparin (porcine)  5,000 Units SubCUTAneous 3 times per day    sodium chloride flush  5-40 mL IntraVENous 2 times per day    [Held by provider] valsartan  40 mg Oral 2 times per day    [Held by provider] carvedilol  3.125 mg Oral BID WC    aspirin  81 mg Oral Daily    methadone  45 mg Oral Daily    folic acid  1 mg Oral Daily     Continuous Infusions:   dextrose      sodium chloride 100 mL/hr at 02/15/23 0009     PRN Meds:.glucose, dextrose bolus **OR** dextrose bolus, glucagon (rDNA), dextrose, prochlorperazine, sodium chloride flush, sodium chloride, polyethylene glycol, acetaminophen **OR** acetaminophen, potassium chloride **OR** potassium alternative oral replacement **OR** potassium chloride, magnesium sulfate, perflutren lipid microspheres, aluminum & magnesium hydroxide-simethicone       Vitals :     Vitals:    02/17/23 0845   BP:    Pulse: 80   Resp: 18   Temp:    SpO2: 93%       I & O :       Intake/Output Summary (Last 24 hours) at 2/17/2023 7685  Last data filed at 2/17/2023 0411  Gross per 24 hour   Intake 591 ml   Output 75 ml   Net 516 ml          Physical Examination :     General appearance: Anxious- no, distressed- no, in good spirits- yes  HEENT: Lips- normal, teeth- ok , oral mucosa- moist  Neck : Mass- no, appears symmetrical, JVD- not visible  Respiratory: Respiratory effort-  normal, wheeze- no, crackles - no. Patient with right chest tube present. Cardiovascular:  Ausculation- No M/R/G, 1+ pitting edema of the bilateral lower extremities and feet  Abdomen: visible mass- no, distention- no, scar- no, tenderness- no                            hepatosplenomegaly-  no  Musculoskeletal:  clubbing no,cyanosis- no , digital ischemia- no                           muscle strength- grossly normal , tone - grossly normal  Skin: rashes- no , ulcers- no, induration- no, tightening - no  Psychiatric:  Judgement and insight- normal           AAO X 3     LABS:     Recent Labs     02/15/23  0445 02/16/23  0708 02/17/23  0509   WBC 6.4 6.2 8.6   HGB 8.0* 8.6* 7.6*   HCT 25.8* 26.9* 24.4*    252 240       Recent Labs     02/15/23  0445 02/16/23  0708 02/17/23  0509    138 137   K 4.3 4.3 3.8    101 100   CO2 27 26 29   BUN 15 14 19   CREATININE 2.7* 2.7* 2.6*   GLUCOSE 71 154* 111*          Will discuss with Dr. Magdaleno Saucedo MD  Internal Medicine, PGY3    [facility]  Internal Medicine, MS4        Patient was seen and examined and the case was discussed with the resident. He acted as my scribe. I agree with the assessment and plan.     Thanks  Nephrology  [address]  Office: [phone]  Cell: [phone]  Fax: [phone]

## 2023-02-17 NOTE — PROGRESS NOTES
Occupational Therapy  Daily Treatment Note  Patient Name: Jacqueline Garcia  MRN: [id number]    Chart Reviewed: Yes     Discharge Recommendations: Jacqueline Garcia scored a 19/24 on the AM-PAC ADL Inpatient form. Current research shows that an AM-PAC score of 18 or greater is typically associated with a discharge to the patient's home setting. Based on the patient's AM-PAC score, and their current ADL deficits, it is recommended that the patient have 2-3 sessions per week of Occupational Therapy at d/c to increase the patient's independence. At this time, this patient demonstrates the endurance and safety to discharge home with home vs OP services and a follow up treatment frequency of 2-3x/wk. Please see assessment section for further patient specific details. If patient discharges prior to next session this note will serve as a discharge summary. Please see below for the latest assessment towards goals. Other position/activity restrictions: up with assistance     Additional Pertinent Hx: Patient is a 56 y.o. male with a PMHx of PE on Eliquis, IVDU, hep C, COPD, hypertension presented to the ED with chief complaint of shortness of breath. Diagnosis: acute pulmonary edema  Treatment Diagnosis: decreased ADLs secondary to PE    Subjective: Pt semi supine in bed upon arrival, agreeable to OT session for mobility and transfers. \"I'll walk but I don't know how much I can do\"    Pain: denied    Objective:    Cognition/Orientation:  WFL     Bed mobility   Supine to sit:  supervision  Sit to Supine: supervision    Functional Mobility   Sit to Stand: SBA  Stand to Sit: SBA + cueing for hand placement  Bed to Chair Transfer: mobility in room with RW with SBA  Other: functional mobility in hallway with RW with SBA. Pt required one standing rest break x 1-2 mins. Reporting muscle fatigue, but no real SOB. ADLs   UB dressing: CGA to don gown  Toileting: standing at RW to use urinal with supervision.     Activity Tolerance:  Pt demonstrated mod fatigue after mobility in hallway. Upon returning to room, O2 sats on 1.5lpm down to 86%, recovering to 90s with rest break and cueing for PLB. Patient Education:   Educated pt on benefits of activity promotion, participation in mobility and transferring to bedside chair daily. Verb understanding, continue to ed for consistency. Safety Devices in Place:  BA activated, call light in reach and all needs met    Assessment:   Pt demonstrated improved activity tolerance and improved functional mobility. Pt required SBA for mobility and transfers. Tolerating urinating at urinal with SBA to supervision. Pt declined further ADLs at this time and declined sitting in bedside chair despite education on benefits. Would benefit from continued OT while in acute care, continue OT POC. Equipment Needs:  possibly shower chair    Timed Code Treatment Minutes: Individual Concurrent Group Co-treatment   Time In 1445         Time Out 1515         Minutes 30           Timed Code Treatment Minutes:  30 mins total    Goals:  Short Term Goals  Time Frame for Short Term Goals: by dc  Short Term Goal 1: Pt will complete LB dressing with SBA- ongoing, continue  Short Term Goal 2: Pt will complete toileting with SBA- goal met for using urinal, continue for meatal care/ BM  Short Term Goal 3: Pt will complete standing level grooming with supervision- ongoing, continue         Plan:      Times Per Week: 2-5   Times Per Day: Once a day    If patient is discharged prior to next treatment, this note will serve as the discharge summary. Radha Almonte.  [address] [facility], OTR/L [id number]

## 2023-02-17 NOTE — CONSULTS
[address] [place] [address]  GI Consultation                                                                 Patient: Jacqueline Garcia  : 1975       Date:  2023    Subjective:       History of Present Illness  Patient is a 56 y.o.  male admitted with Acute pulmonary edema (HCC) [J81.0]  Pleural effusion [J90]  Hypoxia [R09.02]  Acute heart failure, unspecified heart failure type ([address] [I50.9] who is seen in consult for GIB. Patient has a history of PE (eliquis), Hep C, IVDU, COPD, HTN who presents with SOB for the past 6-7 days. Patient states he has been taking 800 mg of Advil daily for the past 2 weeks because he thought it would help with his shortness of breath. He endorses a nonproductive cough. Denies any chest pain, nausea, vomiting, changes in bowel habits. Patient was admitted for new onset CHF as well as diagnostic rap of pleural fluid. During his hospital course, patient had 2 episodes of black tarry stools. Hgb on admission was ~11 and dropped to a stable ~7-8. GI was consulted for GIB. Past Medical History:   Diagnosis Date    htn     IVDU (intravenous drug user)     PE (pulmonary thromboembolism) ([facility] [address]       Past Surgical History:   Procedure Laterality Date    BRONCHOSCOPY N/A 2/10/2023    BRONCHOSCOPY ALVEOLAR LAVAGE performed by Katia Meyer MD at [address] [place] [address],[address] N/A 2/10/2023    BRONCHOSCOPY/TRANSBRONCHIAL LUNG BIOPSY performed by Katia Meyer MD at [facility] ENDOSCOPY      Past Endoscopic History    - atrophy in antrum and stomach body; erythema in GEJ. Admission Meds  No current facility-administered medications on file prior to encounter. Current Outpatient Medications on File Prior to Encounter   Medication Sig Dispense Refill    apixaban (ELIQUIS) 5 MG TABS tablet Take 5 mg by mouth 2 times daily      methadone (DOLOPHINE) 10 MG tablet Take 45 mg by mouth daily.       aspirin 81 MG chewable tablet Take 81 mg by mouth daily naproxen (NAPROSYN) 500 MG tablet Take 1 tablet by mouth 2 times daily (Patient not taking: Reported on 2/8/2023) 30 tablet 0    methocarbamol (ROBAXIN) 500 MG tablet Take 1 tablet by mouth 3 times daily (Patient not taking: Reported on 2/8/2023) 20 tablet 0       Patient denies ASA. Allergies  No Known Allergies   Social   Social History     Tobacco Use    Smoking status: Every Day     Packs/day: 0.50     Types: Cigarettes    Smokeless tobacco: Never   Substance Use Topics    Alcohol use: Not Currently        History reviewed. No pertinent family history. No family history of colon cancer, Crohn's disease, or ulcerative colitis. Review of Systems  Pertinent items are noted in HPI. Physical Exam    /78   Pulse 92   Temp 98.3 °F (36.8 °C) (Oral)   Resp 18   Ht 5' 10\" (1.778 m)   Wt 227 lb 8.2 oz (103.2 kg)   SpO2 94%   BMI 32.65 kg/m²   General appearance: alert, cooperative, no distress, appears stated age  Anicteric, No Jaundice  Head: Normocephalic, without obvious abnormality  Lungs: clear to auscultation bilaterally, Normal Effort  Heart: regular rate and rhythm, normal S1 and S2, no murmurs or rubs  Abdomen: soft, non-tender; bowel sounds normal; no masses,  no organomegaly  Extremities: atraumatic, no cyanosis or edema  Skin: warm and dry  Neuro: intact  AAOX3      Data Review:    Recent Labs     02/15/23  0445 02/16/23  0708 02/17/23  0509   WBC 6.4 6.2 8.6   HGB 8.0* 8.6* 7.6*   HCT 25.8* 26.9* 24.4*   .8* 98.8 99.8    252 240     Recent Labs     02/15/23  0445 02/16/23  0708 02/17/23  0509    138 137   K 4.3 4.3 3.8    101 100   CO2 27 26 29   BUN 15 14 19   CREATININE 2.7* 2.7* 2.6*     Recent Labs     02/15/23  0445   AST 14*   ALT 6*   BILIDIR <0.2   BILITOT 0.5   ALKPHOS 62     No results for input(s): LIPASE, AMYLASE in the last 72 hours.   Recent Labs     02/15/23  1301   PROTIME 16.6*   INR 1.35*     No results for input(s): PTT in the last 72 hours. No results for input(s): OCCULTBLD in the last 72 hours. Imaging Studies:                            Ultrasound:  None              CT-scan of abdomen and pelvis: None                 Assessment:     Principal Problem:    Acute heart failure, unspecified heart failure type (HCC)  Active Problems:    Normocytic anemia    Chronic anticoagulation    HTN (hypertension)    Opioid dependence on agonist therapy (HCC)    Hx pulmonary embolism    Acute pulmonary edema (HCC)    Hemothorax on right    Pleural effusion    Hypoxemia    Organizing pneumonia ([address]  Resolved Problems:    * No resolved hospital problems. *    GI bleed  - Likely gastritis 2/2 NSAID use  - NSAID use (800 mg daily x 2 weeks)  - Baseline Hgb ~11; Hgb stable at 7-8     Recommendations:     Patient clearly states he does not wish for a colonoscopy at this time. He agreed to colonoscopy once cleared by cardiology and pulmonology. EGD isn't urgent since Hgb and hemodynamically stable; can do before discharge. Continue to monitor H&H  Continue Protonix 40 mg BID    Thank you for the opportunity to participate in DR [facility]URDEN [facility] care.     Elif Villanueva MD

## 2023-02-17 NOTE — PLAN OF CARE
Problem: Discharge Planning  Goal: Discharge to home or other facility with appropriate resources  Outcome: Progressing  Flowsheets  Taken 2/17/2023 0133  Discharge to home or other facility with appropriate resources:   Arrange for needed discharge resources and transportation as appropriate   Identify barriers to discharge with patient and caregiver   Identify discharge learning needs (meds, wound care, etc)  Taken 2/16/2023 1937  Discharge to home or other facility with appropriate resources: Identify barriers to discharge with patient and caregiver     Problem: Safety - Adult  Goal: Free from fall injury  Outcome: Progressing  Flowsheets  Taken 2/17/2023 0133  Free From Fall Injury: Instruct family/caregiver on patient safety  Taken 2/16/2023 2230  Free From Fall Injury: Instruct family/caregiver on patient safety     Problem: ABCDS Injury Assessment  Goal: Absence of physical injury  Outcome: Progressing  Flowsheets (Taken 2/16/2023 2230)  Absence of Physical Injury: Implement safety measures based on patient assessment     Problem: Respiratory - Adult  Goal: Achieves optimal ventilation and oxygenation  Outcome: Progressing  Flowsheets  Taken 2/17/2023 0133  Achieves optimal ventilation and oxygenation:   Assess for changes in respiratory status   Assess for changes in mentation and behavior   Position to facilitate oxygenation and minimize respiratory effort  Taken 2/16/2023 1937  Achieves optimal ventilation and oxygenation: Assess for changes in respiratory status     Problem: Cardiovascular - Adult  Goal: Absence of cardiac dysrhythmias or at baseline  Outcome: Progressing  Flowsheets  Taken 2/17/2023 0133  Absence of cardiac dysrhythmias or at baseline:   Monitor cardiac rate and rhythm   Assess for signs of decreased cardiac output  Taken 2/16/2023 1937  Absence of cardiac dysrhythmias or at baseline: Monitor cardiac rate and rhythm     Problem: Metabolic/Fluid and Electrolytes - Adult  Goal: Electrolytes maintained within normal limits  Outcome: Progressing  Flowsheets  Taken 2/17/2023 0133  Electrolytes maintained within normal limits:   Monitor labs and assess patient for signs and symptoms of electrolyte imbalances   Administer electrolyte replacement as ordered   Monitor response to electrolyte replacements, including repeat lab results as appropriate  Taken 2/16/2023 1937  Electrolytes maintained within normal limits: Monitor labs and assess patient for signs and symptoms of electrolyte imbalances     Problem: Metabolic/Fluid and Electrolytes - Adult  Goal: Hemodynamic stability and optimal renal function maintained  Outcome: Progressing  Flowsheets  Taken 2/17/2023 0133  Hemodynamic stability and optimal renal function maintained:   Monitor intake, output and patient weight   Monitor labs and assess for signs and symptoms of volume excess or deficit   Monitor urine specific gravity, serum osmolarity and serum sodium as indicated or ordered  Taken 2/16/2023 1937  Hemodynamic stability and optimal renal function maintained: Monitor labs and assess for signs and symptoms of volume excess or deficit     Problem: Pain  Goal: Verbalizes/displays adequate comfort level or baseline comfort level  Outcome: Progressing  Flowsheets  Taken 2/17/2023 0133  Verbalizes/displays adequate comfort level or baseline comfort level: Encourage patient to monitor pain and request assistance  Taken 2/16/2023 2328  Verbalizes/displays adequate comfort level or baseline comfort level: Encourage patient to monitor pain and request assistance  Taken 2/16/2023 1937  Verbalizes/displays adequate comfort level or baseline comfort level: Encourage patient to monitor pain and request assistance     Problem: Skin/Tissue Integrity  Goal: Absence of new skin breakdown  Description: 1. Monitor for areas of redness and/or skin breakdown  2. Assess vascular access sites hourly  3.   Every 4-6 hours minimum:  Change oxygen saturation probe site  4. Every 4-6 hours:  If on nasal continuous positive airway pressure, respiratory therapy assess nares and determine need for appliance change or resting period.   Outcome: Progressing

## 2023-02-17 NOTE — PROGRESS NOTES
Pulmonary Followup Note    CC: Pneumonitis    Interval History:  Pt reports resting well overnight and denies any complaints at this time. Denies any cough, dyspnea, fever, chills. States drainage from chest tube site has resolved and denies any pain to area. States he feels comfortable discharging on O2 if needed, but agreeable to staying longer. [START ON 2023] pantoprazole  40 mg Oral QAM AC    thiamine  100 mg Oral Daily    predniSONE  100 mg Oral Daily    ipratropium-albuterol  1 ampule Inhalation Q4H WA    heparin (porcine)  5,000 Units SubCUTAneous 3 times per day    sodium chloride flush  5-40 mL IntraVENous 2 times per day    aspirin  81 mg Oral Daily    methadone  45 mg Oral Daily    folic acid  1 mg Oral Daily     PHYSICAL EXAMINATION:  BP (!) 131/91   Pulse 84   Temp 98 °F (36.7 °C) (Oral)   Resp 16   Ht 5' 10\" (1.778 m)   Wt 227 lb 8.2 oz (103.2 kg)   SpO2 93%   BMI 32.65 kg/m²   CURRENT PULSE OXIMETRY:  SpO2: 93 %  24HR PULSE OXIMETRY RANGE:  SpO2  Av.6 %  Min: 90 %  Max: 96 % on [facility]      Physical Exam  Vitals and nursing note reviewed. Constitutional:       General: He is not in acute distress. Appearance: Normal appearance. He is normal weight. He is not ill-appearing or diaphoretic. HENT:      Head: Normocephalic and atraumatic. Mouth/Throat:      Mouth: Mucous membranes are moist.      Pharynx: Oropharynx is clear. Cardiovascular:      Rate and Rhythm: Normal rate and regular rhythm. Pulses: Normal pulses. Heart sounds: Normal heart sounds. No murmur heard. No friction rub. No gallop. Pulmonary:      Breath sounds: No wheezing or rhonchi. Comments: Bibasilar rales, R>L. 1L O2 NC. Normal work of breathing at rest.   Abdominal:      General: There is no distension. Palpations: Abdomen is soft. Tenderness: There is no abdominal tenderness. There is no guarding. Hernia: No hernia is present. Musculoskeletal:      Right lower leg: No edema. Left lower leg: No edema. Comments: Chest tube dressing in place c/d/I with no active drainage. Skin:     General: Skin is warm and dry. Coloration: Skin is not pale. Neurological:      Mental Status: He is alert and oriented to person, place, and time. DATA  CBC:   Recent Labs     02/15/23  0445 02/16/23  0708 02/17/23  0509   WBC 6.4 6.2 8.6   HGB 8.0* 8.6* 7.6*   HCT 25.8* 26.9* 24.4*   .8* 98.8 99.8    252 240       BMP:   Recent Labs     02/15/23  0445 02/16/23  0708 02/17/23  0509    138 137   K 4.3 4.3 3.8    101 100   CO2 27 26 29   BUN 15 14 19   CREATININE 2.7* 2.7* 2.6*       No results for input(s): PHART, WEN1LTG, PO2ART in the last 72 hours. LIVER PROFILE:   Recent Labs     02/15/23  0445   AST 14*   ALT 6*   BILIDIR <0.2   BILITOT 0.5   ALKPHOS 62         CXR REVIEWED BY ME AND SHOWED:  XR CHEST PORTABLE   Final Result      Stable appearance of the chest.      XR CHEST PORTABLE   Final Result      Unchanged pulmonary edema and moderate right pleural effusion. XR ABDOMEN (KUB) (SINGLE AP VIEW)   Final Result      Nonobstructive bowel gas pattern. XR CHEST PORTABLE   Final Result      No significant change. XR CHEST PORTABLE   Final Result      Stable appearance of the chest.      XR CHEST PORTABLE   Final Result   1. Slight improvement in multifocal airspace disease. 2.  No significant change in the right pleural effusion. XR CHEST PORTABLE   Final Result   1. No change. XR CHEST PORTABLE   Final Result   1. Persistent right effusion and right lower lung airspace disease without change from prior. 2. Extensive left-sided airspace disease stable to prior. FLUORO FOR SURGICAL PROCEDURES   Final Result      1. Intraprocedure fluoroscopy was provided. XR CHEST 1 VIEW   Final Result      1. Intraprocedure fluoroscopy was provided.       XR CHEST PORTABLE   Final Result      1. No change in appearance of bilateral airspace disease. 2.  Right inferior thoracic pigtail catheter likely pleural drain with small to moderate right pleural effusion unchanged. XR CHEST PORTABLE   Final Result   1. As above. XR CHEST PORTABLE   Final Result      1. Stable appearance of the chest with residual right pleural effusion with right chest tube in place and trace associated right basilar pleural gas. 2.  Extensive bilateral airspace disease in the left greater than right lungs. XR CHEST PORTABLE   Final Result      1. Slightly decreased loculated right pleural effusion status post right chest tube placement. There is a small amount of associated right pleural gas. 2.  Persistent diffuse airspace disease in the left lung and right mid and lower lung airspace disease. CT CHEST PULMONARY EMBOLISM W CONTRAST   Final Result      Linear, eccentric, and left leg filling defects in the right lower lobe segmental and subsegmental pulmonary arteries are suggestive of chronic pulmonary emboli. No definite acute pulmonary emboli identified. Extensive groundglass opacification of both lungs is suspicious for atypical pneumonia although a component of asymmetric pulmonary edema could have a similar appearance. Large complex loculated right pleural fluid collection. Recommend diagnostic thoracentesis, as hemothorax or malignant effusion cannot be excluded. Trace left pleural effusion. INCIDENTAL FINDINGS: None. XR CHEST PORTABLE   Final Result      Extensive bilateral airspace disease, which may represent pneumonia or pulmonary edema. Cardiomegaly. Moderate to large right and questionable small left pleural effusions.            ASSESSMENT/PLAN:    Right hemothorax  Organizing PNA/Focal early fibrosis of uncertain etiology  - Cultures remaining negative  -Transbronchial biopsy shows organizing pneumonia, focal early fibrosis, no malignancy/granulomatous  - s/p Zosyn (2/9 - 2/15), Vanc 2/9-2/12  - started on Prednisone 1 mg/kg for fibrosis   - Discharge on 40mg daily  - possible that illegal drugs can lead to fibrosis of this pattern  - Safe for EGD from pulm standpoint  - On discharge to f/u with Dr. Eyad Parks in clinic in three weeks    Patient has been seen and staffed with Dr. Adebayo Hernandez MD  PGY-1  Patient examined, findings as discussed with Dr. Peggy Carrington. Organizing pneumonia not significantly changed with initiation of steroid therapy. He will need reevaluation as outpatient in about 3 weeks. He may require low-flow O2 at discharge, will obtain evaluation for home O2.

## 2023-02-17 NOTE — PROGRESS NOTES
Internal Medicine Progress Note    Patient Name: Jorje Lopez   Patient : 1975   Date: 2023   Admit Date: 2023     CC: Shortness of Breath (Pt complaining of sob x6 days. Was brought in on a NRB and 94%. States he has not been taking his blood thinner as he should)       Subjective     Interval History: Patient seen at bedside this AM.  Try to wean him off to 1 L oxygen however patient started desatting to 80s, he denies any dyspnea, cough, chest pain, fever or chills. He does not have any drainage from the site of chest tube insertion. Patient did report having 1 episode of dark tarry bowel movement overnight. He also mentioned that he had an EGD in  which had revealed acute gastritis diagnosis. ROS:  As per interval history above. Objective     Vital Signs:  Patient Vitals for the past 8 hrs:   BP Temp Temp src Pulse Resp SpO2 Weight   23 0845 -- -- -- 80 18 93 % --   23 0800 -- -- -- 76 15 94 % --   23 0734 (!) 131/91 -- -- 75 -- -- --   23 0730 (!) 131/91 98 °F (36.7 °C) Oral 75 18 94 % --   23 0600 -- -- -- 82 -- -- --   23 0411 136/87 97.5 °F (36.4 °C) Oral 89 16 90 % --   23 0300 -- -- -- -- -- -- 227 lb 8.2 oz (103.2 kg)   23 0200 -- -- -- 70 -- -- --   23 0133 -- -- -- -- -- 90 % --       Physical Exam:  Physical Exam  Constitutional:       General: He is not in acute distress. Appearance: He is obese. He is not ill-appearing, toxic-appearing or diaphoretic. HENT:      Nose: Nose normal.      Mouth/Throat:      Mouth: Mucous membranes are moist.   Eyes:      Pupils: Pupils are equal, round, and reactive to light. Cardiovascular:      Rate and Rhythm: Normal rate and regular rhythm. Pulses: Normal pulses. Heart sounds: Normal heart sounds. Pulmonary:      Effort: Pulmonary effort is normal. No respiratory distress. Breath sounds: No stridor. Rales present. No wheezing or rhonchi. Comments: Bilateral basilar Rales  Chest:      Chest wall: No tenderness. Abdominal:      General: Bowel sounds are normal. There is no distension. Palpations: Abdomen is soft. There is no mass. Tenderness: There is no abdominal tenderness. There is no right CVA tenderness, left CVA tenderness, guarding or rebound. Hernia: No hernia is present. Musculoskeletal:      Cervical back: Normal range of motion. Right lower leg: No edema. Left lower leg: No edema. Skin:     Capillary Refill: Capillary refill takes less than 2 seconds. Coloration: Skin is pale. Skin is not jaundiced. Findings: No bruising, erythema, lesion or rash. Neurological:      General: No focal deficit present. Mental Status: He is alert and oriented to person, place, and time. Cranial Nerves: No cranial nerve deficit. Sensory: No sensory deficit. Motor: No weakness.       Coordination: Coordination normal.      Gait: Gait normal.      Deep Tendon Reflexes: Reflexes normal.      Comments: Mild asymmetrical asterixis     Psychiatric:         Mood and Affect: Mood normal.       Intake/Output Summary (Last 24 hours) at 2/17/2023 0858  Last data filed at 2/17/2023 0411  Gross per 24 hour   Intake 591 ml   Output 75 ml   Net 516 ml        Medications:   thiamine  100 mg Oral Daily    predniSONE  100 mg Oral Daily    ipratropium-albuterol  1 ampule Inhalation Q4H WA    heparin (porcine)  5,000 Units SubCUTAneous 3 times per day    sodium chloride flush  5-40 mL IntraVENous 2 times per day    [Held by provider] valsartan  40 mg Oral 2 times per day    [Held by provider] carvedilol  3.125 mg Oral BID WC    aspirin  81 mg Oral Daily    methadone  45 mg Oral Daily    folic acid  1 mg Oral Daily       dextrose      sodium chloride 100 mL/hr at 02/15/23 0009      glucose, dextrose bolus **OR** dextrose bolus, glucagon (rDNA), dextrose, prochlorperazine, sodium chloride flush, sodium chloride, polyethylene glycol, acetaminophen **OR** acetaminophen, potassium chloride **OR** potassium alternative oral replacement **OR** potassium chloride, magnesium sulfate, perflutren lipid microspheres, aluminum & magnesium hydroxide-simethicone     Labs:  CBC:   Recent Labs     02/15/23  0445 02/16/23  0708 02/17/23  0509   WBC 6.4 6.2 8.6   HGB 8.0* 8.6* 7.6*   HCT 25.8* 26.9* 24.4*    252 240   .8* 98.8 99.8       Renal:    Recent Labs     02/15/23  0445 02/16/23  0708 02/17/23  0509    138 137   K 4.3 4.3 3.8    101 100   CO2 27 26 29   BUN 15 14 19   CREATININE 2.7* 2.7* 2.6*   GLUCOSE 71 154* 111*   CALCIUM 8.3 8.6 8.8   ANIONGAP 9 11 8       Hepatic:   Recent Labs     02/15/23  0445   AST 14*   ALT 6*   BILITOT 0.5   BILIDIR <0.2   PROT 5.6*   LABALBU 2.4*   ALKPHOS 62       Troponin: Invalid input(s): TROPONIN    Lactic acid: Invalid input(s): LACTICACID    BNP: No results for input(s): BNP in the last 72 hours. Pro-BNP:   Recent Labs     02/15/23  0445   PROBNP 1,837*       Lipids: No results for input(s): CHOL, TRIG, HDL, LDLCALC, VLDL in the last 72 hours. ABGs:  No results for input(s): PHART, RUT2LYD, PO2ART, DPA2VLC, BEART, THGBART, Q2CSDQBI, BQA1MIR in the last 72 hours. VBGs: No results for input(s): PHVEN, MSQ7WKP, PO2VEN in the last 72 hours. INR:   Recent Labs     02/15/23  1301   INR 1.35*     aPTT: No results for input(s): APTT in the last 72 hours. Procalcitonin: No results for input(s): PROCAL in the last 72 hours. CRP: No results for input(s): CRP in the last 72 hours. ESR: No results for input(s): ESR in the last 72 hours. Radiology:  XR CHEST PORTABLE   Final Result      Stable appearance of the chest.      XR CHEST PORTABLE   Final Result      Unchanged pulmonary edema and moderate right pleural effusion. XR ABDOMEN (KUB) (SINGLE AP VIEW)   Final Result      Nonobstructive bowel gas pattern.       XR CHEST PORTABLE   Final Result      No significant change. XR CHEST PORTABLE   Final Result      Stable appearance of the chest.      XR CHEST PORTABLE   Final Result   1. Slight improvement in multifocal airspace disease. 2.  No significant change in the right pleural effusion. XR CHEST PORTABLE   Final Result   1. No change. XR CHEST PORTABLE   Final Result   1. Persistent right effusion and right lower lung airspace disease without change from prior. 2. Extensive left-sided airspace disease stable to prior. FLUORO FOR SURGICAL PROCEDURES   Final Result      1. Intraprocedure fluoroscopy was provided. XR CHEST 1 VIEW   Final Result      1. Intraprocedure fluoroscopy was provided. XR CHEST PORTABLE   Final Result      1. No change in appearance of bilateral airspace disease. 2.  Right inferior thoracic pigtail catheter likely pleural drain with small to moderate right pleural effusion unchanged. XR CHEST PORTABLE   Final Result   1. As above. XR CHEST PORTABLE   Final Result      1. Stable appearance of the chest with residual right pleural effusion with right chest tube in place and trace associated right basilar pleural gas. 2.  Extensive bilateral airspace disease in the left greater than right lungs. XR CHEST PORTABLE   Final Result      1. Slightly decreased loculated right pleural effusion status post right chest tube placement. There is a small amount of associated right pleural gas. 2.  Persistent diffuse airspace disease in the left lung and right mid and lower lung airspace disease. CT CHEST PULMONARY EMBOLISM W CONTRAST   Final Result      Linear, eccentric, and left leg filling defects in the right lower lobe segmental and subsegmental pulmonary arteries are suggestive of chronic pulmonary emboli. No definite acute pulmonary emboli identified.       Extensive groundglass opacification of both lungs is suspicious for atypical pneumonia although a component of asymmetric pulmonary edema could have a similar appearance. Large complex loculated right pleural fluid collection. Recommend diagnostic thoracentesis, as hemothorax or malignant effusion cannot be excluded. Trace left pleural effusion. INCIDENTAL FINDINGS: None. XR CHEST PORTABLE   Final Result      Extensive bilateral airspace disease, which may represent pneumonia or pulmonary edema. Cardiomegaly. Moderate to large right and questionable small left pleural effusions. Assessment & Plan   Jarek Martinez is a 56 y.o. male, with PMHx of chronic PE on Eliquis, IVDU, Hep C, COPD, HTN who presents to the ED with complaints of SOB for the past 6 or 7 days. Pt has had a nonproductive cough as well. Acute Hypoxic Respiratory Failure-Improving   Likely 2/2 to pleural effusion vs malignancy  Pt has acute onset shortness of breath that began 6-7 days ago. CT PE shows significant pleural effusions on the right side as well a ground glass opacities. BNP 4462.  6/2022 - patient weight is ~240. Patient this admission is ~225 (down 15 lbs)   Patient unimpressive infectious workup (nl WBC, barely elevated pro alba .19, afebrile).    Trop <0 .01  New O2 requirement (15 HFNC on admission), now on 1 L, ECHO: 55%-60% EF, unable to eval diastolic fxn, 41 mmhg   -Cards consulted: Dillon Recs  -Pulmonology consulted dillon recs  -BAL and transbronchial biopsy cytology and culture results mostly negative  -S/p Zosyn  -wean off oxygen as able      Large complex loculated right pleural fluid-resolving  Unknown etiology: culture pending  Tap on 2/8 with LD and Protein concerning for exudative effusion   Ddx: Chronic PE vs PNA vs Hemothorax   - Pulm consulted appreciate recs  - bronch with no acute findings     KARINA-improving  Pre Renal  Likely secondary to suspected prerenal, hemodynamic changes as well as decreasing hemoglobin,  FeNa:0.9%  Patient baseline creatinine = 1.0  Today his creatinine jumped to 2. 5>2.9>2.7>2.7>2.6  -encouraging po intake   -Nephro consult: mariela recs  -strict intake and output UOP: 325     Concern Upper GI Bleed   Pt has a hx of gastritis an had one episode of dark tarry bowel overnight. -GI Consulted  -Plan for EGD once stable , need cardio and pulmonary clearance     Concern for Chronic hepatitis  Pt has hx of hep C and this morning appears more distended, and has asterixis on exam,  He reported; completed treatment for Hep C in 2021 and also had an hepatic encephalopathy episode the same year  -Hepatitis panel Hep C ab positive, PCR pending   -We will consider Abdominal Ultrasound if abdominal distension continues to worsens     Chronic PE   CT PE does not show a new PE  Previous admission with + hypercoag workup for showing +homocysteine. .   - ASA  - eliquis held  - Hypercoag labs: B12 nl , folate low, replete     Interstitial Fibrosis  Focal early Fibrosis  -Started on Prednisone 1mg/kg      Acute on Chronic Anemia    on 2/9 Hgb: 7.5>7.6 (hGB:11 2022)  nl B12, folate <2  -folate replete  -Patient also with significant bloody output via Chest tube in past 2 days. -output slowing down today. -If acutely hypotensive or bleeding will check HnH      Hx of IVDU  Pt states that he has not used in a long time and is decreasing the dose of methadone  -45mg Methadone     HTN  -home coreg, holding today  -home valsartan. Holding today    DVT PPx:sub q heparin  Diet:  ADULT DIET; Regular;  Low Sodium (2 gm)   Code status:  Full Code     Will discuss with attending physician Dr. Kamala Manuel MD.     Dewey Fitzgerald MD  Internal Medicine, PGY-1

## 2023-02-17 NOTE — PLAN OF CARE
Problem: Discharge Planning  Goal: Discharge to home or other facility with appropriate resources  2/17/2023 0826 by Hilda Morales RN  Outcome: Progressing  Flowsheets (Taken 2/17/2023 0800)  Discharge to home or other facility with appropriate resources:   Identify barriers to discharge with patient and caregiver   Arrange for needed discharge resources and transportation as appropriate    Problem: Safety - Adult  Goal: Free from fall injury  2/17/2023 0826 by Hilda Morales RN    Problem: ABCDS Injury Assessment  Goal: Absence of physical injury  2/17/2023 0826 by Hilda Morales RN  Outcome: Progressing    Problem: Respiratory - Adult  Goal: Achieves optimal ventilation and oxygenation  2/17/2023 0826 by Hilda Morales RN  Outcome: Progressing  Flowsheets (Taken 2/17/2023 0800)  Achieves optimal ventilation and oxygenation:   Assess for changes in respiratory status   Assess for changes in mentation and behavior  2/17/2023 0133 by Autumn Barksdale RN  Outcome: Progressing  Flowsheets  Taken 2/17/2023 0133  Achieves optimal ventilation and oxygenation:   Assess for changes in respiratory status   Assess for changes in mentation and behavior   Position to facilitate oxygenation and minimize respiratory effort    Problem: Cardiovascular - Adult  Goal: Maintains optimal cardiac output and hemodynamic stability  2/17/2023 0826 by Hilda Morales RN  Outcome: Progressing  Flowsheets (Taken 2/17/2023 0800)  Maintains optimal cardiac output and hemodynamic stability:   Monitor blood pressure and heart rate   Monitor urine output and notify Licensed Independent Practitioner for values outside of normal range   Assess for signs of decreased cardiac output  2/17/2023 0133 by Autumn Barksdale RN  Outcome: Progressing  Flowsheets  Taken 2/17/2023 0133  Maintains optimal cardiac output and hemodynamic stability:   Monitor blood pressure and heart rate   Monitor urine output and notify Licensed Independent Practitioner for values outside of normal range   Assess for signs of decreased cardiac output  Taken 2/16/2023 1937  Maintains optimal cardiac output and hemodynamic stability: Monitor blood pressure and heart rate  Goal: Absence of cardiac dysrhythmias or at baseline  2/17/2023 0826 by Miriam Goldstein RN  Outcome: Progressing  Flowsheets (Taken 2/17/2023 0800)  Absence of cardiac dysrhythmias or at baseline:   Monitor cardiac rate and rhythm   Assess for signs of decreased cardiac output  2/17/2023 0133 by Geovanni Ritter RN  Outcome: Progressing  Flowsheets  Taken 2/17/2023 0133  Absence of cardiac dysrhythmias or at baseline:   Monitor cardiac rate and rhythm   Assess for signs of decreased cardiac output    Problem: Metabolic/Fluid and Electrolytes - Adult  Goal: Electrolytes maintained within normal limits  2/17/2023 0826 by Miriam Goldstein RN  Outcome: Progressing  Flowsheets (Taken 2/17/2023 0800)  Electrolytes maintained within normal limits:   Monitor labs and assess patient for signs and symptoms of electrolyte imbalances   Administer electrolyte replacement as ordered   Fluid restriction as ordered  2/17/2023 0133 by Geovanni Ritter RN  Outcome: Progressing  Flowsheets  Taken 2/17/2023 0133  Electrolytes maintained within normal limits:   Monitor labs and assess patient for signs and symptoms of electrolyte imbalances   Administer electrolyte replacement as ordered   Monitor response to electrolyte replacements, including repeat lab results as appropriate  Taken 2/16/2023 1937  Electrolytes maintained within normal limits: Monitor labs and assess patient for signs and symptoms of electrolyte imbalances  Goal: Hemodynamic stability and optimal renal function maintained  2/17/2023 0826 by Miriam Goldstein RN  Outcome: Progressing  Flowsheets (Taken 2/17/2023 0800)  Hemodynamic stability and optimal renal function maintained:   Monitor labs and assess for signs and symptoms of volume excess or deficit   Monitor intake, output and patient weight  2/17/2023 0133 by Eulalia Ott RN  Outcome: Progressing  Flowsheets  Taken 2/17/2023 0133  Hemodynamic stability and optimal renal function maintained:   Monitor intake, output and patient weight   Monitor labs and assess for signs and symptoms of volume excess or deficit   Monitor urine specific gravity, serum osmolarity and serum sodium as indicated or ordered    Problem: Pain  Goal: Verbalizes/displays adequate comfort level or baseline comfort level  2/17/2023 0826 by Michael Joy RN  Outcome: Progressing  Flowsheets (Taken 2/17/2023 0411 by Eulalia Ott RN)  Verbalizes/displays adequate comfort level or baseline comfort level: Encourage patient to monitor pain and request assistance  2/17/2023 0133 by Eulalia Ott RN  Outcome: Progressing  Flowsheets  Taken 2/17/2023 0133  Verbalizes/displays adequate comfort level or baseline comfort level: Encourage patient to monitor pain and request assistance  Taken 2/16/2023 2328  Verbalizes/displays adequate comfort level or baseline comfort level: Encourage patient to monitor pain and request assistance  Taken 2/16/2023 1937  Verbalizes/displays adequate comfort level or baseline comfort level: Encourage patient to monitor pain and request assistance     Problem: Skin/Tissue Integrity  Goal: Absence of new skin breakdown  Description: 1. Monitor for areas of redness and/or skin breakdown  2. Assess vascular access sites hourly  3. Every 4-6 hours minimum:  Change oxygen saturation probe site  4. Every 4-6 hours:  If on nasal continuous positive airway pressure, respiratory therapy assess nares and determine need for appliance change or resting period.   2/17/2023 0826 by Michael Joy RN  Outcome: Progressing

## 2023-02-18 LAB
ANION GAP SERPL CALCULATED.3IONS-SCNC: 7 MMOL/L (ref 3–16)
BASOPHILS ABSOLUTE: 0 K/UL (ref 0–0.2)
BASOPHILS RELATIVE PERCENT: 0.3 %
BUN BLDV-MCNC: 22 MG/DL (ref 7–20)
CALCIUM SERPL-MCNC: 8.6 MG/DL (ref 8.3–10.6)
CHLORIDE BLD-SCNC: 99 MMOL/L (ref 99–110)
CO2: 29 MMOL/L (ref 21–32)
CREAT SERPL-MCNC: 2.3 MG/DL (ref 0.9–1.3)
EOSINOPHILS ABSOLUTE: 0 K/UL (ref 0–0.6)
EOSINOPHILS RELATIVE PERCENT: 0 %
GFR SERPL CREATININE-BSD FRML MDRD: 35 ML/MIN/{1.73_M2}
GLUCOSE BLD-MCNC: 109 MG/DL (ref 70–99)
GLUCOSE BLD-MCNC: 117 MG/DL (ref 70–99)
GLUCOSE BLD-MCNC: 127 MG/DL (ref 70–99)
HCT VFR BLD CALC: 24 % (ref 40.5–52.5)
HCV QNT BY NAAT IU/ML: NOT DETECTED IU/ML
HCV QNT BY NAAT LOG IU/ML: NOT DETECTED LOG IU/ML
HEMOGLOBIN: 7.6 G/DL (ref 13.5–17.5)
INTERPRETATION: NOT DETECTED
LYMPHOCYTES ABSOLUTE: 0.5 K/UL (ref 1–5.1)
LYMPHOCYTES RELATIVE PERCENT: 5.9 %
MCH RBC QN AUTO: 31.3 PG (ref 26–34)
MCHC RBC AUTO-ENTMCNC: 31.7 G/DL (ref 31–36)
MCV RBC AUTO: 98.7 FL (ref 80–100)
MONOCYTES ABSOLUTE: 0.9 K/UL (ref 0–1.3)
MONOCYTES RELATIVE PERCENT: 9.4 %
NEUTROPHILS ABSOLUTE: 7.7 K/UL (ref 1.7–7.7)
NEUTROPHILS RELATIVE PERCENT: 84.4 %
PDW BLD-RTO: 18.6 % (ref 12.4–15.4)
PERFORMED ON: ABNORMAL
PERFORMED ON: ABNORMAL
PLATELET # BLD: 231 K/UL (ref 135–450)
PMV BLD AUTO: 10.3 FL (ref 5–10.5)
POTASSIUM SERPL-SCNC: 3.8 MMOL/L (ref 3.5–5.1)
RBC # BLD: 2.43 M/UL (ref 4.2–5.9)
SODIUM BLD-SCNC: 135 MMOL/L (ref 136–145)
WBC # BLD: 9.1 K/UL (ref 4–11)

## 2023-02-18 PROCEDURE — 6370000000 HC RX 637 (ALT 250 FOR IP)

## 2023-02-18 PROCEDURE — 2580000003 HC RX 258: Performed by: INTERNAL MEDICINE

## 2023-02-18 PROCEDURE — 36415 COLL VENOUS BLD VENIPUNCTURE: CPT

## 2023-02-18 PROCEDURE — 94640 AIRWAY INHALATION TREATMENT: CPT

## 2023-02-18 PROCEDURE — 2700000000 HC OXYGEN THERAPY PER DAY

## 2023-02-18 PROCEDURE — 6360000002 HC RX W HCPCS

## 2023-02-18 PROCEDURE — 85025 COMPLETE CBC W/AUTO DIFF WBC: CPT

## 2023-02-18 PROCEDURE — 2060000000 HC ICU INTERMEDIATE R&B

## 2023-02-18 PROCEDURE — 80048 BASIC METABOLIC PNL TOTAL CA: CPT

## 2023-02-18 PROCEDURE — 6370000000 HC RX 637 (ALT 250 FOR IP): Performed by: INTERNAL MEDICINE

## 2023-02-18 PROCEDURE — 94761 N-INVAS EAR/PLS OXIMETRY MLT: CPT

## 2023-02-18 PROCEDURE — 99233 SBSQ HOSP IP/OBS HIGH 50: CPT | Performed by: INTERNAL MEDICINE

## 2023-02-18 PROCEDURE — 6370000000 HC RX 637 (ALT 250 FOR IP): Performed by: STUDENT IN AN ORGANIZED HEALTH CARE EDUCATION/TRAINING PROGRAM

## 2023-02-18 RX ADMIN — ENOXAPARIN SODIUM 30 MG: 100 INJECTION SUBCUTANEOUS at 20:17

## 2023-02-18 RX ADMIN — ENOXAPARIN SODIUM 30 MG: 100 INJECTION SUBCUTANEOUS at 09:51

## 2023-02-18 RX ADMIN — IPRATROPIUM BROMIDE AND ALBUTEROL SULFATE 1 AMPULE: 2.5; .5 SOLUTION RESPIRATORY (INHALATION) at 08:26

## 2023-02-18 RX ADMIN — THIAMINE HCL TAB 100 MG 100 MG: 100 TAB at 09:52

## 2023-02-18 RX ADMIN — IPRATROPIUM BROMIDE AND ALBUTEROL SULFATE 1 AMPULE: 2.5; .5 SOLUTION RESPIRATORY (INHALATION) at 12:52

## 2023-02-18 RX ADMIN — Medication 45 MG: at 12:14

## 2023-02-18 RX ADMIN — PANTOPRAZOLE SODIUM 40 MG: 40 TABLET, DELAYED RELEASE ORAL at 15:43

## 2023-02-18 RX ADMIN — IPRATROPIUM BROMIDE AND ALBUTEROL SULFATE 1 AMPULE: 2.5; .5 SOLUTION RESPIRATORY (INHALATION) at 16:42

## 2023-02-18 RX ADMIN — SODIUM CHLORIDE, PRESERVATIVE FREE 10 ML: 5 INJECTION INTRAVENOUS at 09:52

## 2023-02-18 RX ADMIN — PANTOPRAZOLE SODIUM 40 MG: 40 TABLET, DELAYED RELEASE ORAL at 05:02

## 2023-02-18 RX ADMIN — PREDNISONE 100 MG: 50 TABLET ORAL at 09:52

## 2023-02-18 RX ADMIN — SODIUM CHLORIDE, PRESERVATIVE FREE 10 ML: 5 INJECTION INTRAVENOUS at 20:17

## 2023-02-18 RX ADMIN — ASPIRIN 81 MG 81 MG: 81 TABLET ORAL at 09:52

## 2023-02-18 RX ADMIN — FOLIC ACID 1 MG: 1 TABLET ORAL at 09:51

## 2023-02-18 ASSESSMENT — PAIN SCALES - GENERAL
PAINLEVEL_OUTOF10: 0

## 2023-02-18 ASSESSMENT — PAIN SCALES - WONG BAKER: WONGBAKER_NUMERICALRESPONSE: 0

## 2023-02-18 NOTE — PLAN OF CARE
Problem: Discharge Planning  Goal: Discharge to home or other facility with appropriate resources  2/18/2023 1048 by Melonie Oshea RN  Outcome: Progressing  Flowsheets (Taken 2/18/2023 0800)  Discharge to home or other facility with appropriate resources: Identify barriers to discharge with patient and caregiver    Problem: Safety - Adult  Goal: Free from fall injury  2/18/2023 1048 by Melonie Oshea RN  Outcome: Progressing    Problem: ABCDS Injury Assessment  Goal: Absence of physical injury  2/18/2023 1048 by Melonie Oshea RN  Outcome: Progressing       Problem: Respiratory - Adult  Goal: Achieves optimal ventilation and oxygenation  2/18/2023 1048 by Melonie Oshea RN  Outcome: Progressing  Flowsheets (Taken 2/18/2023 0800)  Achieves optimal ventilation and oxygenation:   Assess for changes in respiratory status   Assess for changes in mentation and behavior   Position to facilitate oxygenation and minimize respiratory effort    Problem: Cardiovascular - Adult  Goal: Maintains optimal cardiac output and hemodynamic stability  2/18/2023 1048 by Melonie Oshea RN  Outcome: Progressing  Flowsheets (Taken 2/18/2023 0800)  Maintains optimal cardiac output and hemodynamic stability:   Monitor blood pressure and heart rate   Monitor urine output and notify Licensed Independent Practitioner for values outside of normal range   Assess for signs of decreased cardiac output    Goal: Absence of cardiac dysrhythmias or at baseline  2/18/2023 1048 by Melonie Oshea RN  Outcome: Progressing  Flowsheets (Taken 2/18/2023 0800)  Absence of cardiac dysrhythmias or at baseline: Assess for signs of decreased cardiac output       Problem: Metabolic/Fluid and Electrolytes - Adult  Goal: Electrolytes maintained within normal limits  2/18/2023 1048 by Melonie Oshea RN  Outcome: Progressing  Flowsheets (Taken 2/18/2023 0800)  Electrolytes maintained within normal limits:   Monitor labs and assess patient for signs and symptoms of electrolyte imbalances    Goal: Hemodynamic stability and optimal renal function maintained  2/18/2023 1048 by Melonie Oshea RN  Outcome: Progressing  Flowsheets (Taken 2/18/2023 0800)  Hemodynamic stability and optimal renal function maintained:   Monitor labs and assess for signs and symptoms of volume excess or deficit   Monitor intake, output and patient weight   Monitor urine specific gravity, serum osmolarity and serum sodium as indicated or ordered       Problem: Pain  Goal: Verbalizes/displays adequate comfort level or baseline comfort level  2/18/2023 1048 by Melonie Oshea RN  Outcome: Progressing  Flowsheets (Taken 2/18/2023 0300 by Jose Florian RN)  Verbalizes/displays adequate comfort level or baseline comfort level: Encourage patient to monitor pain and request assistance    Problem: Skin/Tissue Integrity  Goal: Absence of new skin breakdown  Description: 1. Monitor for areas of redness and/or skin breakdown  2. Assess vascular access sites hourly  3. Every 4-6 hours minimum:  Change oxygen saturation probe site  4. Every 4-6 hours:  If on nasal continuous positive airway pressure, respiratory therapy assess nares and determine need for appliance change or resting period.   2/18/2023 1048 by Melonie Oshea RN  Outcome: Progressing

## 2023-02-18 NOTE — PLAN OF CARE
Problem: Discharge Planning  Goal: Discharge to home or other facility with appropriate resources  Outcome: Progressing  Flowsheets  Taken 2/18/2023 0022  Discharge to home or other facility with appropriate resources:   Identify barriers to discharge with patient and caregiver   Arrange for needed discharge resources and transportation as appropriate   Identify discharge learning needs (meds, wound care, etc)  Taken 2/17/2023 1929  Discharge to home or other facility with appropriate resources: Identify barriers to discharge with patient and caregiver     Problem: Safety - Adult  Goal: Free from fall injury  Outcome: Progressing  Flowsheets  Taken 2/18/2023 0022  Free From Fall Injury: Instruct family/caregiver on patient safety  Taken 2/17/2023 2230  Free From Fall Injury: Instruct family/caregiver on patient safety     Problem: ABCDS Injury Assessment  Goal: Absence of physical injury  Outcome: Progressing  Flowsheets (Taken 2/17/2023 2230)  Absence of Physical Injury: Implement safety measures based on patient assessment     Problem: Respiratory - Adult  Goal: Achieves optimal ventilation and oxygenation  Outcome: Progressing  Flowsheets  Taken 2/18/2023 0022  Achieves optimal ventilation and oxygenation:   Assess for changes in respiratory status   Assess for changes in mentation and behavior   Position to facilitate oxygenation and minimize respiratory effort  Taken 2/17/2023 2304  Achieves optimal ventilation and oxygenation: Assess for changes in respiratory status  Taken 2/17/2023 1929  Achieves optimal ventilation and oxygenation: Assess for changes in respiratory status     Problem: Cardiovascular - Adult  Goal: Maintains optimal cardiac output and hemodynamic stability  Outcome: Progressing  Flowsheets  Taken 2/18/2023 0022  Maintains optimal cardiac output and hemodynamic stability:   Monitor blood pressure and heart rate   Monitor urine output and notify Licensed Independent Practitioner for values outside of normal range   Assess for signs of decreased cardiac output   Administer fluid and/or volume expanders as ordered  Taken 2/17/2023 1929  Maintains optimal cardiac output and hemodynamic stability: Monitor blood pressure and heart rate     Problem: Cardiovascular - Adult  Goal: Absence of cardiac dysrhythmias or at baseline  Outcome: Progressing  Flowsheets  Taken 2/18/2023 0022  Absence of cardiac dysrhythmias or at baseline:   Assess for signs of decreased cardiac output   Monitor cardiac rate and rhythm  Taken 2/17/2023 1929  Absence of cardiac dysrhythmias or at baseline: Monitor cardiac rate and rhythm     Problem: Metabolic/Fluid and Electrolytes - Adult  Goal: Electrolytes maintained within normal limits  Outcome: Progressing  Flowsheets  Taken 2/18/2023 0022  Electrolytes maintained within normal limits:   Monitor response to electrolyte replacements, including repeat lab results as appropriate   Administer electrolyte replacement as ordered   Monitor labs and assess patient for signs and symptoms of electrolyte imbalances  Taken 2/17/2023 1929  Electrolytes maintained within normal limits: Monitor labs and assess patient for signs and symptoms of electrolyte imbalances     Problem: Metabolic/Fluid and Electrolytes - Adult  Goal: Hemodynamic stability and optimal renal function maintained  Outcome: Progressing  Flowsheets  Taken 2/18/2023 0022  Hemodynamic stability and optimal renal function maintained:   Monitor labs and assess for signs and symptoms of volume excess or deficit   Monitor intake, output and patient weight   Monitor urine specific gravity, serum osmolarity and serum sodium as indicated or ordered  Taken 2/17/2023 1929  Hemodynamic stability and optimal renal function maintained: Monitor labs and assess for signs and symptoms of volume excess or deficit     Problem: Pain  Goal: Verbalizes/displays adequate comfort level or baseline comfort level  Outcome: Progressing  Flowsheets  Taken 2/18/2023 0022  Verbalizes/displays adequate comfort level or baseline comfort level:   Encourage patient to monitor pain and request assistance   Assess pain using appropriate pain scale   Administer analgesics based on type and severity of pain and evaluate response  Taken 2/17/2023 2304  Verbalizes/displays adequate comfort level or baseline comfort level: Encourage patient to monitor pain and request assistance  Taken 2/17/2023 1929  Verbalizes/displays adequate comfort level or baseline comfort level: Encourage patient to monitor pain and request assistance     Problem: Skin/Tissue Integrity  Goal: Absence of new skin breakdown  Description: 1. Monitor for areas of redness and/or skin breakdown  2. Assess vascular access sites hourly  3. Every 4-6 hours minimum:  Change oxygen saturation probe site  4. Every 4-6 hours:  If on nasal continuous positive airway pressure, respiratory therapy assess nares and determine need for appliance change or resting period.   Outcome: Progressing

## 2023-02-18 NOTE — PROGRESS NOTES
[facility] Daily Progress Note      Admit Date:  2/7/2023    Subjective:  Mr. Sulaiman Morales was seen and examined. F/U CHF. Breathing about same. No chest pain.      Objective:   /86   Pulse 75   Temp 98.5 °F (36.9 °C) (Oral)   Resp 16   Ht 5' 10\" (1.778 m)   Wt 229 lb 15 oz (104.3 kg)   SpO2 93%   BMI 32.99 kg/m²     Intake/Output Summary (Last 24 hours) at 2/18/2023 0715  Last data filed at 2/18/2023 0209  Gross per 24 hour   Intake 675 ml   Output 325 ml   Net 350 ml       TELEMETRY: Sinus     Physical Exam:  General:  Awake, alert, NAD  Skin:  Warm and dry  Neck:  JVP difficult  Chest:  decreased BS,crackles, respiration normal at rest.  Cardiovascular:  RRR S1S2  Abdomen:  Soft nontender  Extremities:  no edema    Medications:    enoxaparin  30 mg SubCUTAneous BID    pantoprazole  40 mg Oral BID AC    thiamine  100 mg Oral Daily    predniSONE  100 mg Oral Daily    ipratropium-albuterol  1 ampule Inhalation Q4H WA    sodium chloride flush  5-40 mL IntraVENous 2 times per day    aspirin  81 mg Oral Daily    methadone  45 mg Oral Daily    folic acid  1 mg Oral Daily      dextrose      sodium chloride 100 mL/hr at 02/15/23 0009     glucose, dextrose bolus **OR** dextrose bolus, glucagon (rDNA), dextrose, prochlorperazine, sodium chloride flush, sodium chloride, polyethylene glycol, acetaminophen **OR** acetaminophen, potassium chloride **OR** potassium alternative oral replacement **OR** potassium chloride, magnesium sulfate, perflutren lipid microspheres, aluminum & magnesium hydroxide-simethicone    Lab Data:  CBC:   Recent Labs     02/16/23  0708 02/17/23  0509 02/17/23  1923 02/18/23  0501   WBC 6.2 8.6  --  9.1   HGB 8.6* 7.6* 7.5* 7.6*   HCT 26.9* 24.4* 23.8* 24.0*   MCV 98.8 99.8  --  98.7    240  --  231     BMP:   Recent Labs     02/16/23  0708 02/17/23  0509 02/18/23  0501    137 135*   K 4.3 3.8 3.8    100 99   CO2 26 29 29   BUN 14 19 22*   CREATININE 2.7* 2.6* 2.3*     LIVER PROFILE:   No results for input(s): AST, ALT, LIPASE, BILIDIR, BILITOT, ALKPHOS in the last 72 hours. Invalid input(s): AMYLASE,  ALB    PT/INR:   Recent Labs     02/15/23  1301   PROTIME 16.6*   INR 1.35*       APTT: No results for input(s): APTT in the last 72 hours. BNP:  No results for input(s): BNP in the last 72 hours. IMAGING:     Assessment:  Patient Active Problem List    Diagnosis Date Noted    Organizing pneumonia ([facility] [address] 02/15/2023    Hypoxemia 02/11/2023    Acute heart failure, unspecified heart failure type ([facility] [address] 02/08/2023    Normocytic anemia 02/08/2023    Chronic anticoagulation 02/08/2023    HTN (hypertension) 02/08/2023    Opioid dependence on agonist therapy ([facility] [address] 02/08/2023    Hx pulmonary embolism 02/08/2023    Acute pulmonary edema ([facility] [address] 02/08/2023    Hemothorax on right 02/08/2023    Pleural effusion 02/08/2023       Plan:  Breathing some better. O2 requirements continue to improve. No chest pain. Coreg/diovan on hold. BP stable. Cr improving. CV has remained stable. He would be reasonable candidate from CV standpoint for EGD assuming respiratory status ok per pulmonary too.        Core Measures:  Discharge instructions:   LVEF documented:   ACEI for LV dysfunction:   Smoking Cessation:    David Gan MD, MD 2/18/2023 7:15 AM

## 2023-02-18 NOTE — PROGRESS NOTES
Internal Medicine Progress Note    Patient Name: Mckayla Escamilla   Patient : 1975   Date: 2023   Admit Date: 2023     CC: Shortness of Breath (Pt complaining of sob x6 days. Was brought in on a NRB and 94%. States he has not been taking his blood thinner as he should)       Subjective     Interval History: Patient seen at bedside this AM.  Patient had a bowel movement earlier today which he denied being dark as the one before. His oxygen requirement has now come down to 0.5 L. He denies any nausea, vomiting, lethargy, cough, chest pain, abdominal pain, changes in urinary pattern or leg swelling. ROS:  As per interval history above. Objective     Vital Signs:  Patient Vitals for the past 8 hrs:   BP Temp Temp src Pulse Resp SpO2 Weight   23 0827 -- -- -- 86 -- 92 % --   23 0805 (!) 144/93 98.3 °F (36.8 °C) Oral 84 18 93 % --   23 0551 -- -- -- 75 -- -- --   23 0400 -- -- -- 68 -- 93 % --   23 0340 -- -- -- -- -- 90 % --   23 0300 133/86 98.5 °F (36.9 °C) Oral 70 16 93 % 229 lb 15 oz (104.3 kg)   23 0200 -- -- -- 62 -- -- --       Physical Exam:  Physical Exam  Constitutional:       General: He is not in acute distress. Appearance: He is obese. He is not ill-appearing, toxic-appearing or diaphoretic. HENT:      Head: Normocephalic and atraumatic. Nose: Nose normal.      Mouth/Throat:      Mouth: Mucous membranes are moist.   Eyes:      Pupils: Pupils are equal, round, and reactive to light. Cardiovascular:      Rate and Rhythm: Normal rate and regular rhythm. Pulses: Normal pulses. Heart sounds: Normal heart sounds. No murmur heard. No friction rub. No gallop. Pulmonary:      Comments: Mild bibasilar Rales  Abdominal:      General: Bowel sounds are normal. There is no distension. Palpations: Abdomen is soft. Tenderness: There is no abdominal tenderness.  There is no right CVA tenderness, left CVA tenderness or guarding. Musculoskeletal:         General: No swelling or deformity. Normal range of motion. Cervical back: Normal range of motion. Right lower leg: No edema. Left lower leg: No edema. Skin:     General: Skin is dry. Capillary Refill: Capillary refill takes less than 2 seconds. Coloration: Skin is not jaundiced or pale. Findings: No bruising, erythema or lesion. Neurological:      General: No focal deficit present. Mental Status: He is alert and oriented to person, place, and time. Mental status is at baseline. Cranial Nerves: No cranial nerve deficit. Sensory: No sensory deficit. Motor: No weakness.       Coordination: Coordination normal.      Gait: Gait normal.   Psychiatric:         Mood and Affect: Mood normal.       Intake/Output Summary (Last 24 hours) at 2/18/2023 0845  Last data filed at 2/18/2023 0209  Gross per 24 hour   Intake 675 ml   Output 325 ml   Net 350 ml        Medications:   enoxaparin  30 mg SubCUTAneous BID    pantoprazole  40 mg Oral BID AC    thiamine  100 mg Oral Daily    predniSONE  100 mg Oral Daily    ipratropium-albuterol  1 ampule Inhalation Q4H WA    sodium chloride flush  5-40 mL IntraVENous 2 times per day    aspirin  81 mg Oral Daily    methadone  45 mg Oral Daily    folic acid  1 mg Oral Daily       dextrose      sodium chloride 100 mL/hr at 02/15/23 0009      glucose, dextrose bolus **OR** dextrose bolus, glucagon (rDNA), dextrose, prochlorperazine, sodium chloride flush, sodium chloride, polyethylene glycol, acetaminophen **OR** acetaminophen, potassium chloride **OR** potassium alternative oral replacement **OR** potassium chloride, magnesium sulfate, perflutren lipid microspheres, aluminum & magnesium hydroxide-simethicone     Labs:  CBC:   Recent Labs     02/16/23  0708 02/17/23  0509 02/17/23  1923 02/18/23  0501   WBC 6.2 8.6  --  9.1   HGB 8.6* 7.6* 7.5* 7.6*   HCT 26.9* 24.4* 23.8* 24.0*    240 --  231   MCV 98.8 99.8  --  98.7       Renal:    Recent Labs     02/16/23  0708 02/17/23  0509 02/18/23  0501    137 135*   K 4.3 3.8 3.8    100 99   CO2 26 29 29   BUN 14 19 22*   CREATININE 2.7* 2.6* 2.3*   GLUCOSE 154* 111* 117*   CALCIUM 8.6 8.8 8.6   ANIONGAP 11 8 7       Hepatic: No results for input(s): AST, ALT, BILITOT, BILIDIR, PROT, LABALBU, ALKPHOS in the last 72 hours. Troponin: Invalid input(s): TROPONIN    Lactic acid: Invalid input(s): LACTICACID    BNP: No results for input(s): BNP in the last 72 hours. Pro-BNP: No results for input(s): PROBNP in the last 72 hours. Lipids: No results for input(s): CHOL, TRIG, HDL, LDLCALC, VLDL in the last 72 hours. ABGs:  No results for input(s): PHART, GHA9TXR, PO2ART, BEG8ANQ, BEART, THGBART, K1IFYYMV, OIP0LWX in the last 72 hours. VBGs: No results for input(s): PHVEN, EVJ6CHY, PO2VEN in the last 72 hours. INR:   Recent Labs     02/15/23  1301   INR 1.35*     aPTT: No results for input(s): APTT in the last 72 hours. Procalcitonin: No results for input(s): PROCAL in the last 72 hours. CRP: No results for input(s): CRP in the last 72 hours. ESR: No results for input(s): ESR in the last 72 hours. Radiology:  XR CHEST PORTABLE   Final Result      Stable appearance of the chest.      XR CHEST PORTABLE   Final Result      Unchanged pulmonary edema and moderate right pleural effusion. XR ABDOMEN (KUB) (SINGLE AP VIEW)   Final Result      Nonobstructive bowel gas pattern. XR CHEST PORTABLE   Final Result      No significant change. XR CHEST PORTABLE   Final Result      Stable appearance of the chest.      XR CHEST PORTABLE   Final Result   1. Slight improvement in multifocal airspace disease. 2.  No significant change in the right pleural effusion. XR CHEST PORTABLE   Final Result   1. No change. XR CHEST PORTABLE   Final Result   1.  Persistent right effusion and right lower lung airspace disease without change from prior. 2. Extensive left-sided airspace disease stable to prior. FLUORO FOR SURGICAL PROCEDURES   Final Result      1. Intraprocedure fluoroscopy was provided. XR CHEST 1 VIEW   Final Result      1. Intraprocedure fluoroscopy was provided. XR CHEST PORTABLE   Final Result      1. No change in appearance of bilateral airspace disease. 2.  Right inferior thoracic pigtail catheter likely pleural drain with small to moderate right pleural effusion unchanged. XR CHEST PORTABLE   Final Result   1. As above. XR CHEST PORTABLE   Final Result      1. Stable appearance of the chest with residual right pleural effusion with right chest tube in place and trace associated right basilar pleural gas. 2.  Extensive bilateral airspace disease in the left greater than right lungs. XR CHEST PORTABLE   Final Result      1. Slightly decreased loculated right pleural effusion status post right chest tube placement. There is a small amount of associated right pleural gas. 2.  Persistent diffuse airspace disease in the left lung and right mid and lower lung airspace disease. CT CHEST PULMONARY EMBOLISM W CONTRAST   Final Result      Linear, eccentric, and left leg filling defects in the right lower lobe segmental and subsegmental pulmonary arteries are suggestive of chronic pulmonary emboli. No definite acute pulmonary emboli identified. Extensive groundglass opacification of both lungs is suspicious for atypical pneumonia although a component of asymmetric pulmonary edema could have a similar appearance. Large complex loculated right pleural fluid collection. Recommend diagnostic thoracentesis, as hemothorax or malignant effusion cannot be excluded. Trace left pleural effusion. INCIDENTAL FINDINGS: None. XR CHEST PORTABLE   Final Result      Extensive bilateral airspace disease, which may represent pneumonia or pulmonary edema. Cardiomegaly. Moderate to large right and questionable small left pleural effusions. Assessment & Plan   Guadalupe Grossman is a 56 y.o. male, with PMHx of chronic PE on Eliquis, IVDU, Hep C, COPD, HTN who presents to the ED with complaints of SOB for the past 6 or 7 days. Pt has had a nonproductive cough as well. Acute Hypoxic Respiratory Failure-Improving   Likely 2/2 to pleural effusion vs malignancy  Pt has acute onset shortness of breath that began 6-7 days ago. CT PE shows significant pleural effusions on the right side as well a ground glass opacities. BNP 4462.  6/2022 - patient weight is ~240. Patient this admission is ~225 (down 15 lbs)   Patient unimpressive infectious workup (nl WBC, barely elevated pro alba .19, afebrile). Trop <0 .01  New O2 requirement (15 HFNC on admission), now on 0.5 L, ECHO: 55%-60% EF, unable to eval diastolic fxn, 41 mmhg   -Cards consulted: Dillon Recs  -Pulmonology consulted dillon recs  -BAL and transbronchial biopsy cytology and culture results mostly negative  -S/p Zosyn  -wean off oxygen as able      Large complex loculated right pleural fluid-resolving  Unknown etiology: culture pending  Tap on 2/8 with LD and Protein concerning for exudative effusion   Ddx: Chronic PE vs PNA vs Hemothorax   - Pulm consulted appreciate recs  - bronch with no acute findings     KARINA-improving  Pre Renal  Likely secondary to suspected prerenal, hemodynamic changes as well as decreasing hemoglobin,  FeNa:0.9%  Patient baseline creatinine = 1.0  Today his creatinine jumped to 2.5>2.9>2.7>2.7>2.6>2.3  -encouraging po intake   -Nephro consult: dillon recs  -strict intake and output UOP: 325     Concern Upper GI Bleed   Pt has a hx of gastritis an had one episode of dark tarry bowel overnight.    -GI Consulted  -Plan for EGD once stable , cardio and pulmonary clearance received     Concern for Chronic hepatitis-ruled out   Pt has hx of hep C and this morning appears more distended, and has asterixis on exam,  He reported; completed treatment for Hep C in 2021 and also had an hepatic encephalopathy episode the same year  -Hepatitis panel Hep C ab positive, PCR wnl  -We will consider Abdominal Ultrasound if abdominal distension continues to worsens     Chronic PE   CT PE does not show a new PE  Previous admission with + hypercoag workup for showing +homocysteine. .   - ASA  - eliquis held, we will consider resuming Eliquis today  - Hypercoag labs: B12 nl , folate low, replete     Interstitial Fibrosis  Focal early Fibrosis  -Started on Prednisone 100mg daily, discharged on 40 mg daily  -Follow-up with Dr. Nga Grullon in clinic in 3 weeks     Acute on Chronic Anemia    on 2/9 Hgb: 7.5>7.6>7.6 (hGB:11 2022)  nl B12, folate <2  -folate replete  -Patient also with significant bloody output via Chest tube in past 2 days. -output slowing down today. -If acutely hypotensive or bleeding will check HnH      Hx of IVDU  Pt states that he has not used in a long time and is decreasing the dose of methadone  -45mg Methadone     HTN  -home coreg, holding today  -home valsartan.  Holding today    DVT PPx:Lovenox   Diet:  Diet NPO Exceptions are: Ice Chips, Sips of Water with Meds   Code status:  Full Code     Will discuss with attending physician Dr. Edwige Tello MD.     Casandra Pace MD  Internal Medicine, PGY-1

## 2023-02-18 NOTE — PROGRESS NOTES
02/18/23 1242   Resting (Room Air)   SpO2 88   HR 97   Resting (On O2)   SpO2 91   HR 93   O2 Device Nasal cannula   O2 Flow Rate (l/min) 1 l/min   During Walk (Room Air)   Comments Pt not walked for pt safety.    After Walk   Comments Pt not walked   Does the Patient Qualify for Home O2 Yes   Liter Flow at Rest 1   Does the Patient Need Portable Oxygen Tanks Yes

## 2023-02-18 NOTE — PROGRESS NOTES
Progress Note    Patient Syeda Beckford  MRN: [id number]  YOB: 1975 Age: 56 y.o. Sex: male  Room: [address] [place] [address]       Admitting Physician: Rita Sam MD   Date of Admission: 2/7/2023 11:28 PM   Primary Care Physician: None None     Subjective:  Syeda Beckford was seen and examined. Denies having any abdominal pain, no hematochezia. Denies having any vomiting. Reports feeling better today    Labs reviewed hemoglobin 7.6  HCV RNA not detected. As per the patient he was treated about 2 years back for hepatitis C    ROS:  Constitutional: Denies fever, no change in appetite  Respiratory: Denies cough or shortness of breath  Cardiovascular: Denies chest pain or edema    Objective:  Vital Signs:   Vitals:    02/18/23 0827   BP:    Pulse: 86   Resp:    Temp:    SpO2: 92%         Physical Exam:  Constitutional: Alert and oriented x 4. No acute distress. Respiratory: Respirations nonlabored, no crepitus  GI: Abdomen nondistended, soft, and nontender. Neurological: No focal deficits noted. No asterixis.     Intake/Output:    Intake/Output Summary (Last 24 hours) at 2/18/2023 1035  Last data filed at 2/18/2023 0209  Gross per 24 hour   Intake 675 ml   Output 325 ml   Net 350 ml        Current Medications:  Current Facility-Administered Medications   Medication Dose Route Frequency Provider Last Rate Last Admin    enoxaparin Sodium (LOVENOX) injection 30 mg  30 mg SubCUTAneous BID Justina Azul MD   30 mg at 02/18/23 0951    pantoprazole (PROTONIX) tablet 40 mg  40 mg Oral BID AC Deborah Mcdermott MD   40 mg at 02/18/23 0502    glucose chewable tablet 16 g  4 tablet Oral PRN Hermila Ny MD        dextrose bolus 10% 125 mL  125 mL IntraVENous PRN Hermila Ny MD        Or    dextrose bolus 10% 250 mL  250 mL IntraVENous PRN Hermila Ny MD        glucagon injection 1 mg  1 mg SubCUTAneous PRN Hermila Ny MD        dextrose 10 % infusion   IntraVENous Continuous PRN Hermila Ny MD        thiamine mononitrate tablet 100 mg  100 mg Oral Daily Deborah Mcdermott MD   100 mg at 02/18/23 0952    predniSONE (DELTASONE) tablet 100 mg  100 mg Oral Daily Jd Mullins MD   100 mg at 02/18/23 0952    ipratropium-albuterol (DUONEB) nebulizer solution 1 ampule  1 ampule Inhalation Q4H WA Viet Cabrera MD   1 ampule at 02/18/23 0826    prochlorperazine (COMPAZINE) injection 10 mg  10 mg IntraVENous Q6H PRN Haris Bullock MD        sodium chloride flush 0.9 % injection 5-40 mL  5-40 mL IntraVENous 2 times per day Kathy Jefferson MD   10 mL at 02/18/23 0952    sodium chloride flush 0.9 % injection 5-40 mL  5-40 mL IntraVENous PRN Kathy Jefferson MD   10 mL at 02/09/23 1301    0.9 % sodium chloride infusion   IntraVENous PRN Kathy Jefferson  mL/hr at 02/15/23 0009 New Bag at 02/15/23 0009    polyethylene glycol (GLYCOLAX) packet 17 g  17 g Oral Daily PRN Kathy Jefferson MD        acetaminophen (TYLENOL) tablet 650 mg  650 mg Oral Q6H PRN Kathy Jefferson MD        Or    acetaminophen (TYLENOL) suppository 650 mg  650 mg Rectal Q6H PRN Kathy Jefferson MD        potassium chloride (KLOR-CON M) extended release tablet 40 mEq  40 mEq Oral PRN Kathy Jefferson MD        Or    potassium bicarb-citric acid (EFFER-K) effervescent tablet 40 mEq  40 mEq Oral PRN Kathy Jefferson MD        Or    potassium chloride 10 mEq/100 mL IVPB (Peripheral Line)  10 mEq IntraVENous PRN Kathy Jefferson MD        magnesium sulfate 2000 mg in 50 mL IVPB premix  2,000 mg IntraVENous PRN Kathy Jefferson MD        perflutren lipid microspheres (DEFINITY) injection 1.5 mL  1.5 mL IntraVENous ONCE PRN Kathy Jefferson MD        aluminum & magnesium hydroxide-simethicone (MAALOX) 200-200-20 MG/5ML suspension 30 mL  30 mL Oral Q6H PRN Kathy Jefferson MD        aspirin chewable tablet 81 mg  81 mg Oral Daily Kathy Jefferson MD   81 mg at 02/18/23 0952    methadone (DOLOPHINE) 10 MG/ML  solution 45 mg  45 mg Oral Daily Jacquie Jefferson MD   45 mg at 98/60/25 2141    folic acid (FOLVITE) tablet 1 mg  1 mg Oral Daily Juan José Malone MD   1 mg at 02/18/23 0951         Recent Imaging:   XR CHEST PORTABLE  Narrative: PORTABLE CHEST    INDICATION: Empyema    COMPARISON: 2/15/23    FINDINGS:    The cardiomediastinal silhouette is stable. Loculated right pleural effusion appears unchanged. Diffuse bilateral airspace disease appears unchanged. No significant effusion seen on the left. No pneumothorax. Impression: Stable appearance of the chest.       Labs:   Recent Labs     02/16/23  0708 02/17/23  0509 02/17/23  1923 02/18/23  0501   HGB 8.6* 7.6* 7.5* 7.6*   WBC 6.2 8.6  --  9.1          Assessment/Plan:    60-year-old male with history of chronic PE on Eliquis, IVDU, hep C, COPD, hypertension admitted with complaints of shortness of breath/acute hypoxic respiratory failure. CT chest on admission shows findings of chronic pulmonary emboli, extensive groundglass opacification of both lungs suspicious for atypical pneumonia, large complex loculated right pleural collection. Seen and followed by cardiology/pulmonary team    Patient reported having dark-colored/black stool for last few days. Hemoglobin 7.6, stable with no further drop noted. He continues to be on nasal oxygen and improving slowly. Plan for EGD once patient is stable, tentatively on Monday. Needs cardiac/pulmonary clearance for the procedure  Continue to follow-up hemoglobin/hematocrit closely  PPI twice daily      MD Anya Shea    [phone].  Also available via Perfect Serve

## 2023-02-18 NOTE — PROGRESS NOTES
[facility]    [facility]              [phone]                       Plan :     BP is well controlled  Urine is ? Creatinine is stabilizing, 1.0> 1.7 > 2.5 > 2.9 > 2.7 > 2.6 > 2.3       -  Vancomycin DC  -  DC IVF  -  DC K supplements   - Avoid nephrotoxic medications ( DC Coreg and Diovan )  - can be DC today with follow up in 1-2 weeks with me          Assessment :     Kim Kolb is a 56 y.o. male with hx of HTN, DVT, PE, treated Hep C, and COPD on HOD#5 for SOB secondary to right pleural collection (suspected hemothorax). Our services were consulted for ATN. KARINA stage 2, suspected ischemic ATN with superimposed Vancomycin ATN  - Pt with signs of ATN today given acute kidney injury class II with etiologies supporting ATN, ischemic vs. Nephrotoxic. Given the history of intermittent hypotension (now normal) and elevated serum vancomycin level (30.5 on 2/13) preceding the creatinine increase, we suspect ATN due to a combination of these causes. As patient has started to urinate again, we suspect gradual improvement and return to function of the kidneys with normal blood pressure and removal of vancomycin  - Discontinue vancomycin; continue Linezolid as ordered by primary team  - Continue blood pressure control   - continue to hold fluids given normal BP today   - Hold BP medications (carvedilol, valsartan)  - Continue to trend labs    Hypotension (resolved)  Intermittent on admission, but well-controlled today.    - continue to hold fluids given normal BP today  - Hold BP medications (carvedilol, valsartan)  - Monitor VS    [facility] would like to thank Nette Luna MD   for opportunity to serve this patient      Please call with questions at-   24 Hrs Answering service [phone] or  7 am- 5 pm via Perfect serve or cell phone  Marquita Kent MD        CC/reason for consult :     ATN     HPI :     Kim Kolb is a 56 y.o. male with history of HTN, DVT, PE, treated Hep C, and COPD who presented to the hospital on 2/7/2023 with shortness of breath. On CT chest on 2/8, showed large right pleural fluid collection with suspected hemothorax  secondary to suspected trauma; chest tube was placed on 2/8 with drainage of sanguinous fluid. Pleural fluid analysis showed , total protein of 4.3, glucose 20 suggesting exudative pleural effusion/empyema or hemothorax. Given collection, patient was started on Zosyn (ongoing until 2/16) and vancomycin on 2/9 (completed on 2/12). Patient afebrile, normocardic, and with normal WBC through hospitalization, but with intermittent hypotension as low as the 37-72P systolic. For the past several days, the patient's creatinine has been elevating (2.5 today from 1.0 on 2/10) leading to consultation of Nephrology for concerns of ATN. Interval History:     Patient reports overall doing well with improving SOB. Chest tube continuing to drain small amounts of sanguinous fluid. Patient reports that he has started to urinate again, albeit it being dark in color. Denies any increases in urinary frequency otherwise or any tenderness with urination. Denies any lower back pain.     ROS:     Seen with- Dr. Alex Diaz MD, Mickey Basilio, MS4    positives in bold   Constitutional:  fever, chills, weakness, weight change, fatigue  Skin:  rash, pruritus, hair loss, bruising, dry skin, petechiae  Head, Face, Neck   headaches, swelling,  cervical adenopathy  Respiratory: shortness of breath, cough, or wheezing  Cardiovascular: chest pain, palpitations, dizzy, edema  Gastrointestinal: nausea, vomiting, diarrhea, constipation,belly pain    Yellow skin, blood in stool  Musculoskeletal:  back pain, muscle weakness, gait problems,       joint pain, swelling of the legs  Genitourinary:  dysuria, poor urine flow, flank pain, dark urine  Neurologic:  vertigo, TIA'S, syncope, seizures, focal weakness  Psychosocial:  insomnia, anxiety, or depression. Additional positive findings:                          All other remaining systems are negative. PMH/PSH/SH/Family History:     Past Medical History:   Diagnosis Date    htn     IVDU (intravenous drug user)     PE (pulmonary thromboembolism) ([facility] [address]        Past Surgical History:   Procedure Laterality Date    BRONCHOSCOPY N/A 2/10/2023    BRONCHOSCOPY ALVEOLAR LAVAGE performed by Chica Maldonado MD at [address] N/A 2/10/2023    BRONCHOSCOPY/TRANSBRONCHIAL LUNG BIOPSY performed by Chica Maldonado MD at [address] [facility] History     Socioeconomic History    Marital status: Single     Spouse name: Not on file    Number of children: Not on file    Years of education: Not on file    Highest education level: Not on file   Occupational History    Not on file   Tobacco Use    Smoking status: Every Day     Packs/day: 0.50     Types: Cigarettes    Smokeless tobacco: Never   Substance and Sexual Activity    Alcohol use: Not Currently    Drug use: Not Currently     Types: Opiates     Sexual activity: Not on file   Other Topics Concern    Not on file   Social History Narrative    Not on file     Social Determinants of Health     Financial Resource Strain: Not on file   Food Insecurity: Not on file   Transportation Needs: Not on file   Physical Activity: Not on file   Stress: Not on file   Social Connections: Not on file   Intimate Partner Violence: Not on file   Housing Stability: Not on file       History reviewed. No pertinent family history.        Medication:     Scheduled Meds:   enoxaparin  30 mg SubCUTAneous BID    pantoprazole  40 mg Oral BID AC    thiamine  100 mg Oral Daily    predniSONE  100 mg Oral Daily    ipratropium-albuterol  1 ampule Inhalation Q4H WA    sodium chloride flush  5-40 mL IntraVENous 2 times per day    aspirin  81 mg Oral Daily    methadone  45 mg Oral Daily    folic acid  1 mg Oral Daily     Continuous Infusions:   dextrose      sodium chloride 100 mL/hr at 02/15/23 0009     PRN Meds:.glucose, dextrose bolus **OR** dextrose bolus, glucagon (rDNA), dextrose, prochlorperazine, sodium chloride flush, sodium chloride, polyethylene glycol, acetaminophen **OR** acetaminophen, potassium chloride **OR** potassium alternative oral replacement **OR** potassium chloride, magnesium sulfate, perflutren lipid microspheres, aluminum & magnesium hydroxide-simethicone       Vitals :     Vitals:    02/18/23 1000   BP:    Pulse: 84   Resp: 18   Temp:    SpO2:        I & O :       Intake/Output Summary (Last 24 hours) at 2/18/2023 1126  Last data filed at 2/18/2023 0209  Gross per 24 hour   Intake 675 ml   Output 325 ml   Net 350 ml          Physical Examination :     General appearance: Anxious- no, distressed- no, in good spirits- yes  HEENT: Lips- normal, teeth- ok , oral mucosa- moist  Neck : Mass- no, appears symmetrical, JVD- not visible  Respiratory: Respiratory effort-  normal, wheeze- no, crackles - no. Patient with right chest tube present. Cardiovascular:  Ausculation- No M/R/G, 1+ pitting edema of the bilateral lower extremities and feet  Abdomen: visible mass- no, distention- no, scar- no, tenderness- no                            hepatosplenomegaly-  no  Musculoskeletal:  clubbing no,cyanosis- no , digital ischemia- no                           muscle strength- grossly normal , tone - grossly normal  Skin: rashes- no , ulcers- no, induration- no, tightening - no  Psychiatric:  Judgement and insight- normal           AAO X 3     LABS:     Recent Labs     02/16/23  0708 02/17/23  0509 02/17/23  1923 02/18/23  0501   WBC 6.2 8.6  --  9.1   HGB 8.6* 7.6* 7.5* 7.6*   HCT 26.9* 24.4* 23.8* 24.0*    240  --  231       Recent Labs     02/16/23  0708 02/17/23  0509 02/18/23  0501    137 135*   K 4.3 3.8 3.8    100 99   CO2 26 29 29   BUN 14 19 22*   CREATININE 2.7* 2.6* 2.3*   GLUCOSE 154* 111* 117*          Will discuss with Dr. Kady Perez, MD  Internal Medicine, PGY3    Corona Glover  Internal Medicine, MS4        Patient was seen and examined and the case was discussed with the resident. He acted as my scribe. I agree with the assessment and plan.     Thanks  Nephrology  [address]  Office: [phone]  Cell: [phone]  Fax: [phone]

## 2023-02-19 VITALS
OXYGEN SATURATION: 93 % | RESPIRATION RATE: 18 BRPM | TEMPERATURE: 97.9 F | BODY MASS INDEX: 32.86 KG/M2 | DIASTOLIC BLOOD PRESSURE: 91 MMHG | HEIGHT: 70 IN | HEART RATE: 81 BPM | SYSTOLIC BLOOD PRESSURE: 157 MMHG | WEIGHT: 229.5 LBS

## 2023-02-19 LAB
ANION GAP SERPL CALCULATED.3IONS-SCNC: 9 MMOL/L (ref 3–16)
ANISOCYTOSIS: ABNORMAL
BASOPHILS ABSOLUTE: 0 K/UL (ref 0–0.2)
BASOPHILS RELATIVE PERCENT: 0 %
BUN BLDV-MCNC: 24 MG/DL (ref 7–20)
CALCIUM SERPL-MCNC: 8.8 MG/DL (ref 8.3–10.6)
CHLORIDE BLD-SCNC: 101 MMOL/L (ref 99–110)
CO2: 28 MMOL/L (ref 21–32)
CREAT SERPL-MCNC: 2.2 MG/DL (ref 0.9–1.3)
EOSINOPHILS ABSOLUTE: 0 K/UL (ref 0–0.6)
EOSINOPHILS RELATIVE PERCENT: 0 %
GFR SERPL CREATININE-BSD FRML MDRD: 36 ML/MIN/{1.73_M2}
GLUCOSE BLD-MCNC: 118 MG/DL (ref 70–99)
HCT VFR BLD CALC: 25.2 % (ref 40.5–52.5)
HEMOGLOBIN: 7.8 G/DL (ref 13.5–17.5)
LYMPHOCYTES ABSOLUTE: 0.6 K/UL (ref 1–5.1)
LYMPHOCYTES RELATIVE PERCENT: 6 %
MACROCYTES: ABNORMAL
MCH RBC QN AUTO: 30.8 PG (ref 26–34)
MCHC RBC AUTO-ENTMCNC: 31.1 G/DL (ref 31–36)
MCV RBC AUTO: 98.9 FL (ref 80–100)
MICROCYTES: ABNORMAL
MONOCYTES ABSOLUTE: 0.3 K/UL (ref 0–1.3)
MONOCYTES RELATIVE PERCENT: 3 %
NEUTROPHILS ABSOLUTE: 9.1 K/UL (ref 1.7–7.7)
NEUTROPHILS RELATIVE PERCENT: 91 %
PDW BLD-RTO: 18.3 % (ref 12.4–15.4)
PLATELET # BLD: 237 K/UL (ref 135–450)
PLATELET SLIDE REVIEW: ADEQUATE
PMV BLD AUTO: 10.5 FL (ref 5–10.5)
POTASSIUM SERPL-SCNC: 3.8 MMOL/L (ref 3.5–5.1)
RBC # BLD: 2.54 M/UL (ref 4.2–5.9)
SLIDE REVIEW: ABNORMAL
SODIUM BLD-SCNC: 138 MMOL/L (ref 136–145)
WBC # BLD: 10 K/UL (ref 4–11)

## 2023-02-19 PROCEDURE — 6370000000 HC RX 637 (ALT 250 FOR IP): Performed by: STUDENT IN AN ORGANIZED HEALTH CARE EDUCATION/TRAINING PROGRAM

## 2023-02-19 PROCEDURE — 6370000000 HC RX 637 (ALT 250 FOR IP)

## 2023-02-19 PROCEDURE — 94761 N-INVAS EAR/PLS OXIMETRY MLT: CPT

## 2023-02-19 PROCEDURE — 6370000000 HC RX 637 (ALT 250 FOR IP): Performed by: INTERNAL MEDICINE

## 2023-02-19 PROCEDURE — 85025 COMPLETE CBC W/AUTO DIFF WBC: CPT

## 2023-02-19 PROCEDURE — 2700000000 HC OXYGEN THERAPY PER DAY

## 2023-02-19 PROCEDURE — 80048 BASIC METABOLIC PNL TOTAL CA: CPT

## 2023-02-19 PROCEDURE — 36415 COLL VENOUS BLD VENIPUNCTURE: CPT

## 2023-02-19 PROCEDURE — 2580000003 HC RX 258: Performed by: INTERNAL MEDICINE

## 2023-02-19 PROCEDURE — 94640 AIRWAY INHALATION TREATMENT: CPT

## 2023-02-19 PROCEDURE — 6360000002 HC RX W HCPCS

## 2023-02-19 RX ORDER — MAGNESIUM HYDROXIDE/ALUMINUM HYDROXICE/SIMETHICONE 120; 1200; 1200 MG/30ML; MG/30ML; MG/30ML
30 SUSPENSION ORAL EVERY 6 HOURS PRN
Qty: 1 EACH | Refills: 0 | Status: SHIPPED | OUTPATIENT
Start: 2023-02-19 | End: 2023-03-21

## 2023-02-19 RX ORDER — MAGNESIUM HYDROXIDE/ALUMINUM HYDROXICE/SIMETHICONE 120; 1200; 1200 MG/30ML; MG/30ML; MG/30ML
30 SUSPENSION ORAL EVERY 6 HOURS PRN
Qty: 1 EACH | Refills: 0 | Status: SHIPPED | OUTPATIENT
Start: 2023-02-19 | End: 2023-02-19 | Stop reason: SDUPTHER

## 2023-02-19 RX ORDER — PANTOPRAZOLE SODIUM 40 MG/1
40 TABLET, DELAYED RELEASE ORAL
Qty: 30 TABLET | Refills: 1 | Status: SHIPPED | OUTPATIENT
Start: 2023-02-19

## 2023-02-19 RX ORDER — PREDNISONE 20 MG/1
40 TABLET ORAL DAILY
Qty: 56 TABLET | Refills: 0 | Status: SHIPPED | OUTPATIENT
Start: 2023-02-20 | End: 2023-02-19 | Stop reason: SDUPTHER

## 2023-02-19 RX ORDER — AMLODIPINE BESYLATE 5 MG/1
5 TABLET ORAL DAILY
Qty: 30 TABLET | Refills: 1 | Status: SHIPPED | OUTPATIENT
Start: 2023-02-19

## 2023-02-19 RX ORDER — FOLIC ACID 1 MG/1
1 TABLET ORAL DAILY
Qty: 30 TABLET | Refills: 3 | Status: SHIPPED | OUTPATIENT
Start: 2023-02-19 | End: 2023-02-19 | Stop reason: SDUPTHER

## 2023-02-19 RX ORDER — PREDNISONE 20 MG/1
40 TABLET ORAL DAILY
Qty: 42 TABLET | Refills: 0 | Status: SHIPPED | OUTPATIENT
Start: 2023-02-19 | End: 2023-02-19 | Stop reason: SDUPTHER

## 2023-02-19 RX ORDER — PREDNISONE 20 MG/1
40 TABLET ORAL DAILY
Qty: 56 TABLET | Refills: 0 | Status: SHIPPED | OUTPATIENT
Start: 2023-02-20 | End: 2023-03-20

## 2023-02-19 RX ORDER — FOLIC ACID 1 MG/1
1 TABLET ORAL DAILY
Qty: 30 TABLET | Refills: 3 | Status: SHIPPED | OUTPATIENT
Start: 2023-02-19

## 2023-02-19 RX ORDER — AMLODIPINE BESYLATE 5 MG/1
5 TABLET ORAL DAILY
Status: DISCONTINUED | OUTPATIENT
Start: 2023-02-19 | End: 2023-02-19 | Stop reason: HOSPADM

## 2023-02-19 RX ORDER — AMLODIPINE BESYLATE 5 MG/1
5 TABLET ORAL DAILY
Qty: 30 TABLET | Refills: 1 | Status: SHIPPED | OUTPATIENT
Start: 2023-02-19 | End: 2023-02-19 | Stop reason: SDUPTHER

## 2023-02-19 RX ORDER — PANTOPRAZOLE SODIUM 40 MG/1
40 TABLET, DELAYED RELEASE ORAL
Qty: 30 TABLET | Refills: 1 | Status: SHIPPED | OUTPATIENT
Start: 2023-02-19 | End: 2023-02-19 | Stop reason: SDUPTHER

## 2023-02-19 RX ADMIN — Medication 45 MG: at 10:08

## 2023-02-19 RX ADMIN — AMLODIPINE BESYLATE 5 MG: 5 TABLET ORAL at 11:25

## 2023-02-19 RX ADMIN — FOLIC ACID 1 MG: 1 TABLET ORAL at 09:40

## 2023-02-19 RX ADMIN — ENOXAPARIN SODIUM 30 MG: 100 INJECTION SUBCUTANEOUS at 09:40

## 2023-02-19 RX ADMIN — SODIUM CHLORIDE, PRESERVATIVE FREE 10 ML: 5 INJECTION INTRAVENOUS at 09:41

## 2023-02-19 RX ADMIN — PREDNISONE 100 MG: 50 TABLET ORAL at 09:41

## 2023-02-19 RX ADMIN — THIAMINE HCL TAB 100 MG 100 MG: 100 TAB at 09:40

## 2023-02-19 RX ADMIN — IPRATROPIUM BROMIDE AND ALBUTEROL SULFATE 1 AMPULE: 2.5; .5 SOLUTION RESPIRATORY (INHALATION) at 08:54

## 2023-02-19 RX ADMIN — IPRATROPIUM BROMIDE AND ALBUTEROL SULFATE 1 AMPULE: 2.5; .5 SOLUTION RESPIRATORY (INHALATION) at 12:55

## 2023-02-19 RX ADMIN — ASPIRIN 81 MG 81 MG: 81 TABLET ORAL at 09:40

## 2023-02-19 RX ADMIN — PANTOPRAZOLE SODIUM 40 MG: 40 TABLET, DELAYED RELEASE ORAL at 05:18

## 2023-02-19 ASSESSMENT — PAIN SCALES - GENERAL
PAINLEVEL_OUTOF10: 0
PAINLEVEL_OUTOF10: 0

## 2023-02-19 NOTE — CARE COORDINATION
SW spoke to F[facility] at [facility], they are able to accept pt's insurance. Flippin will meet w/Pt and bring O2 tank to the hospital.    O2 order must state pt needs Continuous and portable at 1lmin.      Electronically signed by CHATA Estes, BERNARDO on 2/19/2023 at 11:30 AM  [phone]

## 2023-02-19 NOTE — CARE COORDINATION
Case Management Assessment            Discharge Note                    Date / Time of Note: 2/19/2023 12:45 PM                  Discharge Note Completed by: CHATA Trujillo, AYSEW    Patient Name: Kate Kaplan   YOB: 1975  Diagnosis: Acute pulmonary edema ([facility] [address] [J81.0]  Pleural effusion [J90]  Hypoxia [R09.02]  Acute heart failure, unspecified heart failure type ([facility] [address] [I50.9]   Date / Time: 2/7/2023 11:28 PM    Current PCP: None None  Clinic patient: No    Hospitalization in the last 30 days: No    Advance Directives:  Code Status: Full Code  [place] DNR form completed and on chart: No    Financial:  Payor: Marian Regalado / Plan: Marian Regalado / Product Type: *No Product type* /      Pharmacy:  No Pharmacies [id number] [employer] medications?: Potential Assistance Purchasing Medications: Yes  Assistance provided by Case Management: None at this time    Does patient want to participate in local refill/ meds to beds program?: No    Meds To Beds General Rules:  1. Can ONLY be done Monday- Friday between 8:30am-5pm  2. Prescription(s) must be in pharmacy by 3pm to be filled same day  3. Copy of patient's insurance/ prescription drug card and patient face sheet must be sent along with the prescription(s)  4. Cost of Rx cannot be added to hospital bill. If financial assistance is needed, please contact unit  or ;  or  CANNOT provide pharmacy voucher for patients co-pays  5.  Patients can then  the prescription on their way out of the hospital at discharge, or pharmacy can deliver to the bedside if staff is available. (payment due at time of pick-up or delivery - cash, check, or card accepted)     Able to afford home medications/ co-pay costs: Yes    ADLS:  Current PT AM-PAC Score: 20 /24  Current OT AM-PAC Score: 19 /24      DISCHARGE Disposition: Home- No Services Needed    LOC at discharge: Not Applicable  EVELIN Completed: Yes    Notification completed in HENS/PAS?:  Not Applicable    IMM Completed:   Not Indicated    Transportation:  Transportation PLAN for discharge: friend   Mode of Transport: Private Car    Home Oxygen and Respiratory Equipment:  Oxygen needed at discharge?: Yes  [address]:  [employer]   Phone: [phone]  Portable tank available for discharge?: Yes      Referrals made at [facility] for outpatient continued care:  Not Applicable    Additional CM Notes:     Pt is from home w/his gf and will dc home. Pt is indp and at his baseline and [facility] to [facility] address. SW unable to provide RW due to insurance, pt will need to take the script and go to a [employer] in [place] in order to have it pad for by his medicaid. Pt will have home O2 1L through [employer]. Jozef from [facility] delivered O2 tank to pt's room. SW provided paperwork to [place]. Pt has no other needs at this time. Pt's gf will transport him home. The Plan for Transition of Care is related to the following treatment goals of Acute pulmonary edema (HCC) [J81.0]  Pleural effusion [J90]  Hypoxia [R09.02]  Acute heart failure, unspecified heart failure type ([address] [I50.9]    The Patient and/or patient representative June Talavera and his family were provided with a choice of provider and agrees with the discharge plan Yes    Freedom of choice list was provided with basic dialogue that supports the patient's individualized plan of care/goals and shares the quality data associated with the providers.  Yes    Care Transitions patient: No    CHATA Ledesma, [facility]  Case Management Department  Ph: [phone]

## 2023-02-19 NOTE — DISCHARGE INSTR - COC
Continuity of Care Form    Patient Name: Becky Cali   :  1975  MRN:  [id number]    Admit date:  2023  Discharge date:  ***    Code Status Order: Full Code   Advance Directives:     Admitting Physician:  Stan Zarate MD  PCP: None None    Discharging Nurse: [facility] Unit/Room#: 1627/9443-31  Discharging Unit Phone Number: ***    Emergency Contact:   Extended Emergency Contact Information  Primary Emergency Contact: bola mcghee  Home Phone: [phone]  Mobile Phone: [phone]  Relation: None  Secondary Emergency Contact: Luis 11 Phone: [phone]  Relation: Girlfriend   needed? No    Past Surgical History:  Past Surgical History:   Procedure Laterality Date    BRONCHOSCOPY N/A 2/10/2023    BRONCHOSCOPY ALVEOLAR LAVAGE performed by Patrica Diamond MD at [address] [place] [address],[address] N/A 2/10/2023    BRONCHOSCOPY/TRANSBRONCHIAL LUNG BIOPSY performed by Patrica Diamond MD at [facility]       Immunization History: There is no immunization history on file for this patient.     Active Problems:  Patient Active Problem List   Diagnosis Code    Acute heart failure, unspecified heart failure type ([facility]) I50.9    Normocytic anemia D64.9    Chronic anticoagulation Z79.01    HTN (hypertension) I10    Opioid dependence on agonist therapy ([facility]) F11.20    Hx pulmonary embolism Z86.711    Acute pulmonary edema ([facility]) J81.0    Hemothorax on right J94.2    Pleural effusion J90    Hypoxemia R09.02    Organizing pneumonia ([facility] [address] J84.89       Isolation/Infection:   Isolation            No Isolation          Patient Infection Status       Infection Onset Added Last Indicated Last Indicated By Review Planned Expiration Resolved Resolved By    None active    Resolved    COVID-19 (Rule Out) 23 COVID-19 & Influenza Combo (Ordered)   23 Rule-Out Test Resulted            Nurse Assessment:  Last Vital Signs: BP (!) 157/91   Pulse 81 Temp 97.9 °F (36.6 °C) (Oral)   Resp 18   Ht 5' 10\" (1.778 m)   Wt 229 lb 8 oz (104.1 kg)   SpO2 93%   BMI 32.93 kg/m²     Last documented pain score (0-10 scale): Pain Level: 0  Last Weight:   Wt Readings from Last 1 Encounters:   02/19/23 229 lb 8 oz (104.1 kg)     Mental Status:  {IP PT MENTAL STATUS:25146}    IV Access:  { EVELIN IV ACCESS:773314986}    Nursing Mobility/ADLs:  Walking   {CHP DME GNBZ:861938694}  Transfer  {CHP DME YYHH:715716060}  Bathing  {CHP DME FUJF:653684792}  Dressing  {CHP DME DFHW:633083313}  Toileting  {CHP DME ZVFE:341070337}  Feeding  {CHP DME AWRD:589908069}  Med Admin  {P DME YIDD:240111601}  Med Delivery   { EVELIN MED Delivery:154212453}    Wound Care Documentation and Therapy:        Elimination:  Continence: Bowel: {YES / QT:82937}  Bladder: {YES / DU:36169}  Urinary Catheter: {Urinary Catheter:604132596}   Colostomy/Ileostomy/Ileal Conduit: {YES / BC:62386}       Date of Last BM: ***    Intake/Output Summary (Last 24 hours) at 2/19/2023 1243  Last data filed at 2/19/2023 1000  Gross per 24 hour   Intake 480 ml   Output 0 ml   Net 480 ml     I/O last 3 completed shifts:   In: 65 [P.O.:835]  Out: 350 [Urine:350]    Safety Concerns:     [address] Safety Concerns:677265266}    Impairments/Disabilities:      [address] Impairments/Disabilities:620551474}    Nutrition Therapy:  Current Nutrition Therapy:   [address] Diet List:846338586}    Routes of Feeding: {CHP DME Other Feedings:763739971}  Liquids: {Slp liquid thickness:15387}  Daily Fluid Restriction: {CHP DME Yes amt example:969233247}  Last Modified Barium Swallow with Video (Video Swallowing Test): {Done Not Done NLWT:359937387}    Treatments at the Time of Hospital Discharge:   Respiratory Treatments: ***  Oxygen Therapy:  {Therapy; copd oxygen:28786}  Ventilator:    {PRISCILLA MALDONADO Vent [facility]:835202167}    Rehab Therapies: {THERAPEUTIC INTERVENTION:8871470588}  Weight Bearing Status/Restrictions: {PRISCILLA MALDONADO Weight Bearin}  Other Medical Equipment (for information only, NOT a DME order):  {EQUIPMENT:238841101}  Other Treatments: ***    Patient's personal belongings (please select all that are sent with patient):  {CHP DME Belongings:636996508}    RN SIGNATURE:  {Esignature:191587610}    CASE MANAGEMENT/SOCIAL WORK SECTION    Inpatient Status Date: 23    Readmission Risk Assessment Score:  Readmission Risk              Risk of Unplanned Readmission:  21           Discharging to Facility/ Agency   Name: [facility]   Address: [address] [place] [address]  Phone: [phone]  Fax:    Dialysis Facility (if applicable)   Name:  Address:  Dialysis Schedule:  Phone:  Fax:    / signature: Electronically signed by CHATA Barajas LSW on 23 at 12:44 PM EST    PHYSICIAN SECTION    Prognosis: {Prognosis:5647617150}    Condition at Discharge: [address] [place] [address] Patient Condition:256853898}    Rehab Potential (if transferring to Rehab): {Prognosis:5979899419}    Recommended Labs or Other Treatments After Discharge: ***    Physician Certification: I certify the above information and transfer of Kim Kolb  is necessary for the continuing treatment of the diagnosis listed and that he requires {Admit to Appropriate Level of Care:64091} for {GREATER/LESS:307640404} 30 days.      Update Admission H&P: {CHP DME Changes in OMCSB:338288341}    PHYSICIAN SIGNATURE:  {Esignature:606163441}

## 2023-02-19 NOTE — PROGRESS NOTES
Internal Medicine Progress Note    Patient Name: Sher Messina   Patient : 1975   Date: 2023   Admit Date: 2023     CC: Shortness of Breath (Pt complaining of sob x6 days. Was brought in on a NRB and 94%. States he has not been taking his blood thinner as he should)       Subjective     Interval History: Patient completed home O2 eval yesterday, demonstrating 1L requirement. Patient seen at bedside this AM. He denies any nausea, vomiting, lethargy, cough, chest pain, abdominal pain, changes in urinary pattern or leg swelling. He feels well and would like to go home. ROS:  As per interval history above. Objective     Vital Signs:  Patient Vitals for the past 8 hrs:   BP Temp Temp src Pulse Resp SpO2 Weight   23 1127 (!) 157/91 97.9 °F (36.6 °C) Oral 81 18 93 % --   23 1055 -- -- -- 75 -- -- --   23 0855 -- -- -- 86 16 91 % --   23 0844 (!) 151/85 98.1 °F (36.7 °C) Oral 76 16 94 % --   23 0600 -- -- -- 74 -- -- --   23 0403 (!) 142/95 97 °F (36.1 °C) Oral 76 18 93 % 229 lb 8 oz (104.1 kg)       Physical Exam:  Physical Exam  Constitutional:       General: He is not in acute distress. Appearance: He is obese. He is not ill-appearing, toxic-appearing or diaphoretic. HENT:      Head: Normocephalic and atraumatic. Nose: Nose normal.      Mouth/Throat:      Mouth: Mucous membranes are moist.   Eyes:      Pupils: Pupils are equal, round, and reactive to light. Cardiovascular:      Rate and Rhythm: Normal rate and regular rhythm. Pulses: Normal pulses. Heart sounds: Normal heart sounds. No murmur heard. No friction rub. No gallop. Pulmonary:      Comments: Mild bibasilar Rales  Abdominal:      General: Bowel sounds are normal. There is no distension. Palpations: Abdomen is soft. Tenderness: There is no abdominal tenderness. There is no right CVA tenderness, left CVA tenderness or guarding.    Musculoskeletal: General: No swelling or deformity. Normal range of motion. Cervical back: Normal range of motion. Right lower leg: No edema. Left lower leg: No edema. Skin:     General: Skin is dry. Capillary Refill: Capillary refill takes less than 2 seconds. Coloration: Skin is not jaundiced or pale. Findings: No bruising, erythema or lesion. Neurological:      General: No focal deficit present. Mental Status: He is alert and oriented to person, place, and time. Mental status is at baseline. Cranial Nerves: No cranial nerve deficit. Sensory: No sensory deficit. Motor: No weakness.       Coordination: Coordination normal.      Gait: Gait normal.   Psychiatric:         Mood and Affect: Mood normal.       Intake/Output Summary (Last 24 hours) at 2/19/2023 1144  Last data filed at 2/19/2023 1000  Gross per 24 hour   Intake 480 ml   Output 350 ml   Net 130 ml        Medications:   amLODIPine  5 mg Oral Daily    enoxaparin  30 mg SubCUTAneous BID    pantoprazole  40 mg Oral BID AC    thiamine  100 mg Oral Daily    predniSONE  100 mg Oral Daily    ipratropium-albuterol  1 ampule Inhalation Q4H WA    sodium chloride flush  5-40 mL IntraVENous 2 times per day    aspirin  81 mg Oral Daily    methadone  45 mg Oral Daily    folic acid  1 mg Oral Daily       dextrose      sodium chloride 100 mL/hr at 02/15/23 0009      glucose, dextrose bolus **OR** dextrose bolus, glucagon (rDNA), dextrose, prochlorperazine, sodium chloride flush, sodium chloride, polyethylene glycol, acetaminophen **OR** acetaminophen, potassium chloride **OR** potassium alternative oral replacement **OR** potassium chloride, magnesium sulfate, perflutren lipid microspheres, aluminum & magnesium hydroxide-simethicone     Labs:  CBC:   Recent Labs     02/17/23  0509 02/17/23  1923 02/18/23  0501 02/19/23  0500   WBC 8.6  --  9.1 10.0   HGB 7.6* 7.5* 7.6* 7.8*   HCT 24.4* 23.8* 24.0* 25.2*     --  231 237   MCV 99.8 --  98.7 98.9       Renal:    Recent Labs     02/17/23  0509 02/18/23  0501 02/19/23  0500    135* 138   K 3.8 3.8 3.8    99 101   CO2 29 29 28   BUN 19 22* 24*   CREATININE 2.6* 2.3* 2.2*   GLUCOSE 111* 117* 118*   CALCIUM 8.8 8.6 8.8   ANIONGAP 8 7 9       Hepatic: No results for input(s): AST, ALT, BILITOT, BILIDIR, PROT, LABALBU, ALKPHOS in the last 72 hours. Troponin: Invalid input(s): TROPONIN    Lactic acid: Invalid input(s): LACTICACID    BNP: No results for input(s): BNP in the last 72 hours. Pro-BNP: No results for input(s): PROBNP in the last 72 hours. Lipids: No results for input(s): CHOL, TRIG, HDL, LDLCALC, VLDL in the last 72 hours. ABGs:  No results for input(s): PHART, NLG7HOC, PO2ART, KHB1GXE, BEART, THGBART, O8JSCBTQ, WKJ7ATQ in the last 72 hours. VBGs: No results for input(s): PHVEN, YRI6YYM, PO2VEN in the last 72 hours. INR:   No results for input(s): INR in the last 72 hours. aPTT: No results for input(s): APTT in the last 72 hours. Procalcitonin: No results for input(s): PROCAL in the last 72 hours. CRP: No results for input(s): CRP in the last 72 hours. ESR: No results for input(s): ESR in the last 72 hours. Radiology:  XR CHEST PORTABLE   Final Result      Stable appearance of the chest.      XR CHEST PORTABLE   Final Result      Unchanged pulmonary edema and moderate right pleural effusion. XR ABDOMEN (KUB) (SINGLE AP VIEW)   Final Result      Nonobstructive bowel gas pattern. XR CHEST PORTABLE   Final Result      No significant change. XR CHEST PORTABLE   Final Result      Stable appearance of the chest.      XR CHEST PORTABLE   Final Result   1. Slight improvement in multifocal airspace disease. 2.  No significant change in the right pleural effusion. XR CHEST PORTABLE   Final Result   1. No change. XR CHEST PORTABLE   Final Result   1.  Persistent right effusion and right lower lung airspace disease without change from prior. 2. Extensive left-sided airspace disease stable to prior. FLUORO FOR SURGICAL PROCEDURES   Final Result      1. Intraprocedure fluoroscopy was provided. XR CHEST 1 VIEW   Final Result      1. Intraprocedure fluoroscopy was provided. XR CHEST PORTABLE   Final Result      1. No change in appearance of bilateral airspace disease. 2.  Right inferior thoracic pigtail catheter likely pleural drain with small to moderate right pleural effusion unchanged. XR CHEST PORTABLE   Final Result   1. As above. XR CHEST PORTABLE   Final Result      1. Stable appearance of the chest with residual right pleural effusion with right chest tube in place and trace associated right basilar pleural gas. 2.  Extensive bilateral airspace disease in the left greater than right lungs. XR CHEST PORTABLE   Final Result      1. Slightly decreased loculated right pleural effusion status post right chest tube placement. There is a small amount of associated right pleural gas. 2.  Persistent diffuse airspace disease in the left lung and right mid and lower lung airspace disease. CT CHEST PULMONARY EMBOLISM W CONTRAST   Final Result      Linear, eccentric, and left leg filling defects in the right lower lobe segmental and subsegmental pulmonary arteries are suggestive of chronic pulmonary emboli. No definite acute pulmonary emboli identified. Extensive groundglass opacification of both lungs is suspicious for atypical pneumonia although a component of asymmetric pulmonary edema could have a similar appearance. Large complex loculated right pleural fluid collection. Recommend diagnostic thoracentesis, as hemothorax or malignant effusion cannot be excluded. Trace left pleural effusion. INCIDENTAL FINDINGS: None. XR CHEST PORTABLE   Final Result      Extensive bilateral airspace disease, which may represent pneumonia or pulmonary edema. Cardiomegaly.       Moderate to large right and questionable small left pleural effusions. Assessment & Plan   Avery Templeton is a 56 y.o. male, with PMHx of chronic PE on Eliquis, IVDU, Hep C, COPD, HTN who presents to the ED with complaints of SOB for the past 6 or 7 days. Pt has had a nonproductive cough as well. Acute Hypoxic Respiratory Failure-Improving   Likely 2/2 to pleural effusion vs malignancy  Pt has acute onset shortness of breath that began 6-7 days ago. CT PE shows significant pleural effusions on the right side as well a ground glass opacities. BNP 4462.  6/2022 - patient weight is ~240. Patient this admission is ~225 (down 15 lbs)   Patient unimpressive infectious workup (nl WBC, barely elevated pro alba .19, afebrile). Trop <0 .01  New O2 requirement (15 HFNC on admission), now on 0.5 L, ECHO: 55%-60% EF, unable to eval diastolic fxn, 41 mmhg   -Cards consulted: [facility] Recs  -Pulmonology consulted dillon recs - will DC on 40mg prednisone x 4 wks  -BAL and transbronchial biopsy cytology and culture results mostly negative  -S/p Zosyn (6 days)  -wean off oxygen as able      Large complex loculated right pleural fluid-resolving  Unknown etiology: culture pending  Tap on 2/8 with LD and Protein concerning for exudative effusion   Ddx: Chronic PE vs PNA vs Hemothorax   - Pulm consulted appreciate recs  - bronch with no acute findings     KARINA-improving  Pre Renal  Likely secondary to suspected prerenal, hemodynamic changes as well as decreasing hemoglobin,  FeNa:0.9%  Patient baseline creatinine = 1.0  Today his creatinine jumped to 2.5>2.9>2.7>2.7>2.6>2.3>2.2  -encouraging po intake   -Nephro consult: dillon recs  -strict intake and output UOP: 350     Concern Upper GI Bleed   Pt has a hx of gastritis an had one episode of dark tarry bowel overnight.    -GI Consulted  -Plan for EGD as outpatient     Concern for Chronic hepatitis-ruled out   Pt has hx of hep C and this morning appears more distended, and has asterixis on exam,  He reported; completed treatment for Hep C in 2021 and also had an hepatic encephalopathy episode the same year  -Hepatitis panel Hep C ab positive, PCR wnl     Chronic PE   CT PE does not show a new PE  Previous admission with + hypercoag workup for showing +homocysteine. .   - ASA  - eliquis held, on lovenox for DVT ppx  - Hypercoag labs: B12 nl , folate low, replete     Interstitial Fibrosis  Focal early Fibrosis  -Started on Prednisone 100mg daily, discharge on 40 mg daily  -Follow-up with Dr. Zainab Solano in clinic in 3 weeks     Acute on Chronic Anemia - stable   on 2/9 Hgb: 7.5>7.6>7.6 (hGB:11 2022)  nl B12, folate <2  -folate repleted  -If acutely hypotensive or bleeding will check HnH      Hx of IVDU  Pt states that he has not used in a long time and is decreasing the dose of methadone  -45mg Methadone     HTN  -home coreg, holding today  -home valsartan. Holding today    Dispo: DC today  DVT PPx:Lovenox   Diet:  ADULT DIET; Regular;  Low Sodium (2 gm)   Code status:  Full Code     Will discuss with attending physician Dr. Elis Castro MD.     Alcides Berman MD  Internal Medicine, PGY-3

## 2023-02-19 NOTE — PLAN OF CARE
Problem: Safety - Adult  Goal: Free from fall injury  Outcome: Progressing  Free From Fall Injury: Instruct family/caregiver on patient safety  Note: All safety precautions in place. Pt free from falls. Problem: Respiratory - Adult  Goal: Achieves optimal ventilation and oxygenation  Outcome: Progressing  Flowsheets (Taken 2/19/2023 0252)  Achieves optimal ventilation and oxygenation: Assess for changes in respiratory status  Note: Pt O2 >92% on 1L NC. Problem: Pain  Goal: Verbalizes/displays adequate comfort level or baseline comfort level  Outcome: Progressing  Flowsheets (Taken 2/19/2023 0252)  Verbalizes/displays adequate comfort level or baseline comfort level:   Encourage patient to monitor pain and request assistance   Assess pain using appropriate pain scale  Note: No pain reported from pt when assessed. No intervention at this time. Will continue to monitor. Problem: Discharge Planning  Goal: Discharge to home or other facility with appropriate resources  Outcome: Progressing  Flowsheets (Taken 2/19/2023 0252)  Discharge to home or other facility with appropriate resources: Identify barriers to discharge with patient and caregiver  Note: Pt will return home when stable.

## 2023-02-19 NOTE — PROGRESS NOTES
[facility]    [facility]              [phone]                       Plan :     BP is elevated   Urine is ? Creatinine is stabilizing, 1.0> 1.7 > 2.5 > 2.9 > 2.7 > 2.6 > 2.2       -  Vancomycin DC  -  DC IVF  -  DC K supplements   -  start Amlodipine   - can be DC today with follow up in 1-2 weeks with me          Assessment :     Paris Rasheed is a 56 y.o. male with hx of HTN, DVT, PE, treated Hep C, and COPD on HOD#5 for SOB secondary to right pleural collection (suspected hemothorax). Our services were consulted for ATN. KARINA stage 2, suspected ischemic ATN with superimposed Vancomycin ATN  - Pt with signs of ATN today given acute kidney injury class II with etiologies supporting ATN, ischemic vs. Nephrotoxic. Given the history of intermittent hypotension (now normal) and elevated serum vancomycin level (30.5 on 2/13) preceding the creatinine increase, we suspect ATN due to a combination of these causes. As patient has started to urinate again, we suspect gradual improvement and return to function of the kidneys with normal blood pressure and removal of vancomycin  - Discontinue vancomycin; continue Linezolid as ordered by primary team  - Continue blood pressure control   - continue to hold fluids given normal BP today   - Hold BP medications (carvedilol, valsartan)  - Continue to trend labs    Hypotension (resolved)  Intermittent on admission, but well-controlled today.    - continue to hold fluids given normal BP today  - Hold BP medications (carvedilol, valsartan)  - Monitor VS    [facility] would like to thank Alice Wilkes MD   for opportunity to serve this patient      Please call with questions at-   24 Hrs Answering service [phone] or  7 am- 5 pm via Perfect serve or cell phone  Genesis Gomez MD        CC/reason for consult :     ATN     HPI :     Paris Rasheed is a 56 y.o. male with history of HTN, DVT, PE, treated Hep C, and COPD who presented to the hospital on 2/7/2023 with shortness of breath. On CT chest on 2/8, showed large right pleural fluid collection with suspected hemothorax  secondary to suspected trauma; chest tube was placed on 2/8 with drainage of sanguinous fluid. Pleural fluid analysis showed , total protein of 4.3, glucose 20 suggesting exudative pleural effusion/empyema or hemothorax. Given collection, patient was started on Zosyn (ongoing until 2/16) and vancomycin on 2/9 (completed on 2/12). Patient afebrile, normocardic, and with normal WBC through hospitalization, but with intermittent hypotension as low as the 20-17S systolic. For the past several days, the patient's creatinine has been elevating (2.5 today from 1.0 on 2/10) leading to consultation of Nephrology for concerns of ATN. Interval History:     Patient reports overall doing well with improving SOB. Chest tube continuing to drain small amounts of sanguinous fluid. Patient reports that he has started to urinate again, albeit it being dark in color. Denies any increases in urinary frequency otherwise or any tenderness with urination. Denies any lower back pain. ROS:     Seen with- Dr. Mathieu Rasmussen MD, William Colon, MS4    positives in bold   Constitutional:  fever, chills, weakness, weight change, fatigue  Skin:  rash, pruritus, hair loss, bruising, dry skin, petechiae  Head, Face, Neck   headaches, swelling,  cervical adenopathy  Respiratory: shortness of breath, cough, or wheezing  Cardiovascular: chest pain, palpitations, dizzy, edema  Gastrointestinal: nausea, vomiting, diarrhea, constipation,belly pain    Yellow skin, blood in stool  Musculoskeletal:  back pain, muscle weakness, gait problems,       joint pain, swelling of the legs  Genitourinary:  dysuria, poor urine flow, flank pain, dark urine  Neurologic:  vertigo, TIA'S, syncope, seizures, focal weakness  Psychosocial:  insomnia, anxiety, or depression.   Additional positive findings: All other remaining systems are negative. PMH/PSH/SH/Family History:     Past Medical History:   Diagnosis Date    htn     IVDU (intravenous drug user)     PE (pulmonary thromboembolism) ([facility] [address]        Past Surgical History:   Procedure Laterality Date    BRONCHOSCOPY N/A 2/10/2023    BRONCHOSCOPY ALVEOLAR LAVAGE performed by Tanya Segundo MD at [address] N/A 2/10/2023    BRONCHOSCOPY/TRANSBRONCHIAL LUNG BIOPSY performed by Tanya Segundo MD at [address] [facility] History     Socioeconomic History    Marital status: Single     Spouse name: Not on file    Number of children: Not on file    Years of education: Not on file    Highest education level: Not on file   Occupational History    Not on file   Tobacco Use    Smoking status: Every Day     Packs/day: 0.50     Types: Cigarettes    Smokeless tobacco: Never   Substance and Sexual Activity    Alcohol use: Not Currently    Drug use: Not Currently     Types: Opiates     Sexual activity: Not on file   Other Topics Concern    Not on file   Social History Narrative    Not on file     Social Determinants of Health     Financial Resource Strain: Not on file   Food Insecurity: Not on file   Transportation Needs: Not on file   Physical Activity: Not on file   Stress: Not on file   Social Connections: Not on file   Intimate Partner Violence: Not on file   Housing Stability: Not on file       History reviewed. No pertinent family history.        Medication:     Scheduled Meds:   enoxaparin  30 mg SubCUTAneous BID    pantoprazole  40 mg Oral BID AC    thiamine  100 mg Oral Daily    predniSONE  100 mg Oral Daily    ipratropium-albuterol  1 ampule Inhalation Q4H WA    sodium chloride flush  5-40 mL IntraVENous 2 times per day    aspirin  81 mg Oral Daily    methadone  45 mg Oral Daily    folic acid  1 mg Oral Daily     Continuous Infusions:   dextrose      sodium chloride 100 mL/hr at 02/15/23 0009     PRN Meds:.glucose, dextrose bolus **OR** dextrose bolus, glucagon (rDNA), dextrose, prochlorperazine, sodium chloride flush, sodium chloride, polyethylene glycol, acetaminophen **OR** acetaminophen, potassium chloride **OR** potassium alternative oral replacement **OR** potassium chloride, magnesium sulfate, perflutren lipid microspheres, aluminum & magnesium hydroxide-simethicone       Vitals :     Vitals:    02/19/23 0855   BP:    Pulse: 86   Resp: 16   Temp:    SpO2: 91%       I & O :       Intake/Output Summary (Last 24 hours) at 2/19/2023 1040  Last data filed at 2/19/2023 0403  Gross per 24 hour   Intake 240 ml   Output 350 ml   Net -110 ml          Physical Examination :     General appearance: Anxious- no, distressed- no, in good spirits- yes  HEENT: Lips- normal, teeth- ok , oral mucosa- moist  Neck : Mass- no, appears symmetrical, JVD- not visible  Respiratory: Respiratory effort-  normal, wheeze- no, crackles - no. Patient with right chest tube present. Cardiovascular:  Ausculation- No M/R/G, 1+ pitting edema of the bilateral lower extremities and feet  Abdomen: visible mass- no, distention- no, scar- no, tenderness- no                            hepatosplenomegaly-  no  Musculoskeletal:  clubbing no,cyanosis- no , digital ischemia- no                           muscle strength- grossly normal , tone - grossly normal  Skin: rashes- no , ulcers- no, induration- no, tightening - no  Psychiatric:  Judgement and insight- normal           AAO X 3     LABS:     Recent Labs     02/17/23  0509 02/17/23  1923 02/18/23  0501 02/19/23  0500   WBC 8.6  --  9.1 10.0   HGB 7.6* 7.5* 7.6* 7.8*   HCT 24.4* 23.8* 24.0* 25.2*     --  231 237       Recent Labs     02/17/23  0509 02/18/23  0501 02/19/23  0500    135* 138   K 3.8 3.8 3.8    99 101   CO2 29 29 28   BUN 19 22* 24*   CREATININE 2.6* 2.3* 2.2*   GLUCOSE 111* 117* 118*          Will discuss with Dr. Tommy Kunz MD  Internal Medicine, PGY3    Autumn Singh  Internal Medicine, MS4        Patient was seen and examined and the case was discussed with the resident. He acted as my scribe. I agree with the assessment and plan.     Thanks  Nephrology  [address]  Office: [phone]  Cell: [phone]  Fax: [phone]

## 2023-02-19 NOTE — PROGRESS NOTES
Pt verbalized understanding of D/C instructions and prescription medications. Pt transferred to hospital entrance via wheelchair. Pt and belongings picked up by girlfriend via personal vehicle to be transported home.

## 2023-02-19 NOTE — PROGRESS NOTES
Progress Note    Patient Becky Cali  MRN: [id number]  YOB: 1975 Age: 56 y.o. Sex: male  Room: [address] [place] [address]       Admitting Physician: Stan Zarate MD   Date of Admission: 2/7/2023 11:28 PM   Primary Care Physician: None None     Subjective:  Becky Cali was seen and examined. Denies having any abdominal pain, no hematochezia. Denies having any vomiting. He is very upset today as he wants to go home    Labs reviewed hemoglobin 7.6  HCV RNA not detected. As per the patient he was treated about 2 years back for hepatitis C    ROS:  Constitutional: Denies fever, no change in appetite  Respiratory: Denies cough or shortness of breath  Cardiovascular: Denies chest pain or edema    Objective:  Vital Signs:   Vitals:    02/19/23 1055   BP:    Pulse: 75   Resp:    Temp:    SpO2:          Physical Exam:  Constitutional: Alert and oriented x 4. No acute distress. Respiratory: Respirations nonlabored, no crepitus  GI: Abdomen nondistended, soft, and nontender. Neurological: No focal deficits noted. No asterixis.     Intake/Output:    Intake/Output Summary (Last 24 hours) at 2/19/2023 1111  Last data filed at 2/19/2023 0403  Gross per 24 hour   Intake 240 ml   Output 350 ml   Net -110 ml        Current Medications:  Current Facility-Administered Medications   Medication Dose Route Frequency Provider Last Rate Last Admin    amLODIPine (NORVASC) tablet 5 mg  5 mg Oral Daily Hilary Castillo MD        enoxaparin Sodium (LOVENOX) injection 30 mg  30 mg SubCUTAneous BID Sonia Echevarria MD   30 mg at 02/19/23 0940    pantoprazole (PROTONIX) tablet 40 mg  40 mg Oral BID AC Deborah Mcdermott MD   40 mg at 02/19/23 0518    glucose chewable tablet 16 g  4 tablet Oral PRN Susan Rodgers MD        dextrose bolus 10% 125 mL  125 mL IntraVENous PRN Susan Rodgers MD        Or    dextrose bolus 10% 250 mL  250 mL IntraVENous PRN Susan Rodgers MD        glucagon injection 1 mg  1 mg SubCUTAneous PRN Hu Cortez MD        dextrose 10 % infusion   IntraVENous Continuous PRN Hu Cortez MD        thiamine mononitrate tablet 100 mg  100 mg Oral Daily Caroll Barthel, MD   100 mg at 02/19/23 0940    predniSONE (DELTASONE) tablet 100 mg  100 mg Oral Daily Judson Mehta MD   100 mg at 02/19/23 0941    ipratropium-albuterol (DUONEB) nebulizer solution 1 ampule  1 ampule Inhalation Q4H WA Ralf Cabrera MD   1 ampule at 02/19/23 0854    prochlorperazine (COMPAZINE) injection 10 mg  10 mg IntraVENous Q6H PRN Jonel Ignacio MD        sodium chloride flush 0.9 % injection 5-40 mL  5-40 mL IntraVENous 2 times per day Emperatriz Harden MD   10 mL at 02/19/23 0941    sodium chloride flush 0.9 % injection 5-40 mL  5-40 mL IntraVENous PRN Emperatriz Harden MD   10 mL at 02/09/23 1301    0.9 % sodium chloride infusion   IntraVENous PRN Emperatriz Harden  mL/hr at 02/15/23 0009 New Bag at 02/15/23 0009    polyethylene glycol (GLYCOLAX) packet 17 g  17 g Oral Daily PRN Emperatriz Harden MD        acetaminophen (TYLENOL) tablet 650 mg  650 mg Oral Q6H PRN Emperatriz Harden MD        Or    acetaminophen (TYLENOL) suppository 650 mg  650 mg Rectal Q6H PRN Emperatriz Harden MD        potassium chloride (KLOR-CON M) extended release tablet 40 mEq  40 mEq Oral PRN Emperatriz Harden MD        Or    potassium bicarb-citric acid (EFFER-K) effervescent tablet 40 mEq  40 mEq Oral PRN Emperatriz Harden MD        Or    potassium chloride 10 mEq/100 mL IVPB (Peripheral Line)  10 mEq IntraVENous PRN Emperatriz Harden MD        magnesium sulfate 2000 mg in 50 mL IVPB premix  2,000 mg IntraVENous PRN Emperatriz Harden MD        perflutren lipid microspheres (DEFINITY) injection 1.5 mL  1.5 mL IntraVENous ONCE PRN Emperatriz Harden MD        aluminum & magnesium hydroxide-simethicone (MAALOX) 200-200-20 MG/5ML suspension 30 mL  30 mL Oral Q6H PRN Emperatriz Harden MD        aspirin chewable tablet 81 mg  81 mg Oral Daily Jolene Jefferson MD   81 mg at 02/19/23 0940    methadone (DOLOPHINE) 10 MG/ML solution 45 mg  45 mg Oral Daily Jolene Jefferson MD   45 mg at 70/18/11 7706    folic acid (FOLVITE) tablet 1 mg  1 mg Oral Daily Argelia Flores MD   1 mg at 02/19/23 0940         Recent Imaging:   XR CHEST PORTABLE  Narrative: PORTABLE CHEST    INDICATION: Empyema    COMPARISON: 2/15/23    FINDINGS:    The cardiomediastinal silhouette is stable. Loculated right pleural effusion appears unchanged. Diffuse bilateral airspace disease appears unchanged. No significant effusion seen on the left. No pneumothorax. Impression: Stable appearance of the chest.       Labs:   Recent Labs     02/17/23  0509 02/17/23  1923 02/18/23  0501 02/19/23  0500   HGB 7.6* 7.5* 7.6* 7.8*   WBC 8.6  --  9.1 10.0          Assessment/Plan:    76-year-old male with history of chronic PE on Eliquis, IVDU, hep C, COPD, hypertension admitted with complaints of shortness of breath/acute hypoxic respiratory failure. CT chest on admission shows findings of chronic pulmonary emboli, extensive groundglass opacification of both lungs suspicious for atypical pneumonia, large complex loculated right pleural collection. Seen and followed by cardiology/pulmonary team    Patient reported having dark-colored/black stool for last few days. Hemoglobin 7.6, stable with no further drop noted. He continues to be on nasal oxygen and improving slowly. We recommended EGD tentatively tomorrow, once stable. However patient seems very upset today and he wants to go home and wants to do EGD electively as outpatient. He does not want to do EGD as inpatient. He understands the risk of GI bleeding which was discussed with him. Recommend PPI twice daily. He was advised to follow-up with us as outpatient. Please call us back with any question or concerns. MD Dayton Romero    [phone].  Also available via [employer]

## 2023-02-20 LAB
FUNGUS (MYCOLOGY) CULTURE: NORMAL
FUNGUS STAIN: NORMAL

## 2023-02-20 NOTE — DISCHARGE SUMMARY
INTERNAL MEDICINE DEPARTMENT AT [address] [place] [address]  DISCHARGE SUMMARY    Patient ID: Amelia Matthew                                             Discharge Date: 2/20/2023   Patient's PCP: None None                                          Discharge Physician: Adryan Angelo MD MD  Admit Date: 2/7/2023   Admitting Physician: Mary Arteaga MD    PROBLEMS DURING HOSPITALIZATION:  Present on Admission:   Acute heart failure, unspecified heart failure type ([facility]   Normocytic anemia   Chronic anticoagulation   HTN (hypertension)   Opioid dependence on agonist therapy ([facility]   Hx pulmonary embolism   Acute pulmonary edema ([facility])   Hemothorax on right   Pleural effusion   Hypoxemia   Organizing pneumonia ([facility]      DISCHARGE DIAGNOSES:    HPI:Jeffry Villanueva 55 yo M w/ pmhx of chronic PE on Eliquis, IVDU, Hep C, COPD, HTN who presents to the ED with complaints of SOB for the past 6 or 7 days. Pt has had a nonproductive cough as well. Pt denies chest pain, nausea, vomiting, changes in bowel habits. The pt stated that last year he went to the ED for acute onset chest pain and shortness of breath due to a saddle pulmonary embolism. Pt was placed on eliquis. ECHO was done during the same stay and showed an EF of 60-65% and was unable to determine if there was any pulmonary hypertension. Pt was an IVDU and is not using anymore and is taking methadone and is positive for hep C that has been treated. Pt did not look volume overloaded and is not edematous in his extremities. On CT PE, pt has continued chronic pulmonary emboli with no sign of acute PE. There was extensive groundglass opacities of both lungs with more of a pulmonary edema picture. As well as right pleural fluid collection. Cannot exclude hemothorax or malignant effusion. Pt is being admitted for work up for new onset HF as well as diagnostic tap of the pleural fluid.  Labs: K 3.3, procal 0.19, BNP 4462, trop <0.01, wbc wnl  Pleural Effusions found on CXR and CT PE    On the Medical Floor: Patient was initially admitted for heart failure exacerbation. Patient was found to have large complex loculated right pleural fluid. In order to provide symptomatic improvement chest tube was placed and also fluid obtained was sent for further analysis. Patient also underwent aggressive diuresis for her kidney function was monitored regularly. Pleural fluid analysis was significant for exudative effusion consistent with possible empyema versus hemothorax. Patient was also found to be inconsistent with their Eliquis medication. Which possibly may have contributed to this presentation. Patient also underwent bronchoscopy with BAL. It was not evident for any endobronchial mass. Once the pt had symptomatic improvement and decreased CT drainage was noted, CT was removed. CXR was significant for Diffusealveolar opacities of uncertain origin. During admission the pt had required supplemental oxygen, near discharge pt was weaned down to 0.5 L oxygen. He had also developed KARINA while undergoing diureses and once diuresis was stopped and oral hydration was encouraged and it iza Cr near to baseline. Pt was also started abx due to concern for possible underlying PNA with Zosyn for 7 days and Vanc for 3 days until MRSA was ruled out. Pt had also developed, Acute on chronic Anemia 2/2 suspected UGI bleed, he was planned to undergo EGD, however pt wished to return home sooner if possible. Once his Hgb was stable along with restarting of Eliquis, he was discharged home with plan for EGD in outpatient settings. He will also be following up with the pulmonologist in outpatient setting,     The following issues were addressed during hospitalization:  Rt hemothorax,   Organizing PNA  Possible UGI Bleed  KARINA   Physical Exam:  Physical Exam  Constitutional:       General: He is not in acute distress. Appearance: He is obese. He is not ill-appearing, toxic-appearing or diaphoretic.    HENT: Head: Normocephalic and atraumatic.      Nose: Nose normal.      Mouth/Throat:      Mouth: Mucous membranes are moist.   Eyes:      Pupils: Pupils are equal, round, and reactive to light.   Cardiovascular:      Rate and Rhythm: Normal rate and regular rhythm.      Pulses: Normal pulses.      Heart sounds: Normal heart sounds. No murmur heard.    No friction rub. No gallop.   Pulmonary:      Comments: Mild bibasilar Rales  Abdominal:      General: Bowel sounds are normal. There is no distension.      Palpations: Abdomen is soft.      Tenderness: There is no abdominal tenderness. There is no right CVA tenderness, left CVA tenderness or guarding.   Musculoskeletal:         General: No swelling or deformity. Normal range of motion.      Cervical back: Normal range of motion.      Right lower leg: No edema.      Left lower leg: No edema.   Skin:     General: Skin is dry.      Capillary Refill: Capillary refill takes less than 2 seconds.      Coloration: Skin is not jaundiced or pale.      Findings: No bruising, erythema or lesion.   Neurological:      General: No focal deficit present.      Mental Status: He is alert and oriented to person, place, and time. Mental status is at baseline.      Cranial Nerves: No cranial nerve deficit.      Sensory: No sensory deficit.      Motor: No weakness.      Coordination: Coordination normal.      Gait: Gait normal.   Psychiatric:         Mood and Affect: Mood normal.    Consults: cardiology, pulmonary/intensive care, GI, and nephrology  Significant Diagnostic Studies:  CT scan torso  Treatments: antibiotics: vancomycin and Zosyn, steroids: prednisone, and procedures: percutaneous drainage catheter placement(Chest Tube)  Disposition: home  Discharged Condition: Stable  Follow Up: Primary Care Physician in one week    DISCHARGE MEDICATION:       Medication List        START taking these medications      aluminum & magnesium hydroxide-simethicone 200-200-20 MG/5ML Susp  suspension  Commonly known as: MAALOX  Take 30 mLs by mouth every 6 hours as needed for Indigestion     amLODIPine 5 MG tablet  Commonly known as: NORVASC  Take 1 tablet by mouth daily     folic acid 1 MG tablet  Commonly known as: FOLVITE  Take 1 tablet by mouth daily     pantoprazole 40 MG tablet  Commonly known as: PROTONIX  Take 1 tablet by mouth 2 times daily (before meals)     predniSONE 20 MG tablet  Commonly known as: DELTASONE  Take 2 tablets by mouth daily for 28 days            CONTINUE taking these medications      aspirin 81 MG chewable tablet     Eliquis 5 MG Tabs tablet  Generic drug: apixaban     methadone 10 MG tablet  Commonly known as: DOLOPHINE            STOP taking these medications      methocarbamol 500 MG tablet  Commonly known as: Robaxin     naproxen 500 MG tablet  Commonly known as: Naprosyn               Where to Get Your Medications        You can get these medications from any pharmacy    Bring a paper prescription for each of these medications  aluminum & magnesium hydroxide-simethicone 200-200-20 MG/5ML Susp suspension  amLODIPine 5 MG tablet  folic acid 1 MG tablet  pantoprazole 40 MG tablet  predniSONE 20 MG tablet          Activity: activity as tolerated  Diet: regular diet  Wound Care: keep wound clean and dry    Time Spent on discharge is more than 30 minutes    Signed:  Oneal Menjivar MD, PGY-1  2/20/2023

## 2023-02-21 LAB
AFB CULTURE (MYCOBACTERIA): NORMAL
AFB SMEAR: NORMAL
FINAL REPORT: NORMAL
PRELIMINARY: NORMAL

## 2023-02-24 LAB
FINAL REPORT: NORMAL
PRELIMINARY: NORMAL

## 2023-03-21 LAB
ACID FAST STN SPEC QL: NORMAL
MYCOBACTERIUM SPEC CULT: NORMAL

## 2023-03-28 LAB
ACID FAST STN SPEC QL: NORMAL
MYCOBACTERIUM SPEC CULT: NORMAL

## (undated) DEVICE — Z DISCONTINUED USE 2749457 TUBING SAMP AD W12.5XH8.4IN D9.1IN NSL ORAL SMRT CAPNOLINE

## (undated) DEVICE — FORCEPS BX L100CM DIA1.8MM WRK CHN 2MM PULM S STL RAD JAW 4